# Patient Record
Sex: FEMALE | Race: WHITE | NOT HISPANIC OR LATINO | Employment: PART TIME | URBAN - METROPOLITAN AREA
[De-identification: names, ages, dates, MRNs, and addresses within clinical notes are randomized per-mention and may not be internally consistent; named-entity substitution may affect disease eponyms.]

---

## 2018-02-02 ENCOUNTER — APPOINTMENT (OUTPATIENT)
Dept: URGENT CARE | Facility: CLINIC | Age: 63
End: 2018-02-02
Payer: OTHER MISCELLANEOUS

## 2018-02-02 ENCOUNTER — APPOINTMENT (OUTPATIENT)
Dept: RADIOLOGY | Facility: CLINIC | Age: 63
End: 2018-02-02

## 2018-02-02 DIAGNOSIS — M79.671 RIGHT FOOT PAIN: Primary | ICD-10-CM

## 2018-02-02 PROCEDURE — 73610 X-RAY EXAM OF ANKLE: CPT

## 2018-02-02 PROCEDURE — 99283 EMERGENCY DEPT VISIT LOW MDM: CPT

## 2018-02-02 PROCEDURE — 73630 X-RAY EXAM OF FOOT: CPT

## 2018-02-02 PROCEDURE — G0382 LEV 3 HOSP TYPE B ED VISIT: HCPCS

## 2018-05-31 ENCOUNTER — APPOINTMENT (OUTPATIENT)
Dept: URGENT CARE | Facility: CLINIC | Age: 63
End: 2018-05-31
Payer: OTHER MISCELLANEOUS

## 2018-05-31 PROCEDURE — 99213 OFFICE O/P EST LOW 20 MIN: CPT

## 2018-10-27 ENCOUNTER — APPOINTMENT (EMERGENCY)
Dept: RADIOLOGY | Facility: HOSPITAL | Age: 63
DRG: 241 | End: 2018-10-27
Payer: COMMERCIAL

## 2018-10-27 ENCOUNTER — APPOINTMENT (INPATIENT)
Dept: CT IMAGING | Facility: HOSPITAL | Age: 63
DRG: 241 | End: 2018-10-27
Payer: COMMERCIAL

## 2018-10-27 ENCOUNTER — OFFICE VISIT (OUTPATIENT)
Dept: URGENT CARE | Facility: CLINIC | Age: 63
End: 2018-10-27
Payer: COMMERCIAL

## 2018-10-27 ENCOUNTER — HOSPITAL ENCOUNTER (INPATIENT)
Facility: HOSPITAL | Age: 63
LOS: 2 days | Discharge: HOME | DRG: 241 | End: 2018-10-29
Attending: EMERGENCY MEDICINE | Admitting: INTERNAL MEDICINE
Payer: COMMERCIAL

## 2018-10-27 VITALS
DIASTOLIC BLOOD PRESSURE: 86 MMHG | HEART RATE: 114 BPM | OXYGEN SATURATION: 95 % | TEMPERATURE: 99.5 F | HEIGHT: 64 IN | WEIGHT: 184 LBS | BODY MASS INDEX: 31.41 KG/M2 | SYSTOLIC BLOOD PRESSURE: 156 MMHG | RESPIRATION RATE: 18 BRPM

## 2018-10-27 DIAGNOSIS — K92.2 GI BLEED: Primary | ICD-10-CM

## 2018-10-27 DIAGNOSIS — D62 ACUTE BLOOD LOSS ANEMIA: ICD-10-CM

## 2018-10-27 DIAGNOSIS — K92.2 ACUTE GI BLEEDING: ICD-10-CM

## 2018-10-27 DIAGNOSIS — K92.1 BLACK STOOLS: ICD-10-CM

## 2018-10-27 DIAGNOSIS — K92.1 MELENA: ICD-10-CM

## 2018-10-27 DIAGNOSIS — R10.30 LOWER ABDOMINAL PAIN: ICD-10-CM

## 2018-10-27 DIAGNOSIS — R53.83 FATIGUE, UNSPECIFIED TYPE: Primary | ICD-10-CM

## 2018-10-27 PROBLEM — E11.65 TYPE 2 DIABETES MELLITUS WITH HYPERGLYCEMIA, WITHOUT LONG-TERM CURRENT USE OF INSULIN (HCC): Status: ACTIVE | Noted: 2018-10-27

## 2018-10-27 PROBLEM — I10 HYPERTENSION: Chronic | Status: ACTIVE | Noted: 2018-10-27

## 2018-10-27 PROBLEM — E66.09 CLASS 1 OBESITY DUE TO EXCESS CALORIES WITHOUT SERIOUS COMORBIDITY WITH BODY MASS INDEX (BMI) OF 30.0 TO 30.9 IN ADULT: Chronic | Status: ACTIVE | Noted: 2018-10-27

## 2018-10-27 PROBLEM — K21.9 GERD (GASTROESOPHAGEAL REFLUX DISEASE): Chronic | Status: ACTIVE | Noted: 2018-10-27

## 2018-10-27 PROBLEM — E11.9 DIABETES MELLITUS (HCC): Chronic | Status: ACTIVE | Noted: 2018-10-27

## 2018-10-27 PROBLEM — E66.811 CLASS 1 OBESITY DUE TO EXCESS CALORIES WITHOUT SERIOUS COMORBIDITY WITH BODY MASS INDEX (BMI) OF 30.0 TO 30.9 IN ADULT: Chronic | Status: ACTIVE | Noted: 2018-10-27

## 2018-10-27 LAB
ABO GROUP BLD: NORMAL
ACETONE SERPL-MCNC: NEGATIVE MG/DL
ALBUMIN SERPL BCP-MCNC: 3.2 G/DL (ref 3.5–5)
ALP SERPL-CCNC: 58 U/L (ref 46–116)
ALT SERPL W P-5'-P-CCNC: 23 U/L (ref 12–78)
ANION GAP SERPL CALCULATED.3IONS-SCNC: 11 MMOL/L (ref 4–13)
APTT PPP: 23 SECONDS (ref 24–36)
AST SERPL W P-5'-P-CCNC: 7 U/L (ref 5–45)
ATRIAL RATE: 102 BPM
BACTERIA UR QL AUTO: ABNORMAL /HPF
BASOPHILS # BLD AUTO: 0.03 THOUSANDS/ΜL (ref 0–0.1)
BASOPHILS # BLD AUTO: 0.05 THOUSANDS/ΜL (ref 0–0.1)
BASOPHILS NFR BLD AUTO: 0 % (ref 0–1)
BASOPHILS NFR BLD AUTO: 0 % (ref 0–1)
BILIRUB SERPL-MCNC: 0.2 MG/DL (ref 0.2–1)
BILIRUB UR QL STRIP: NEGATIVE
BLD GP AB SCN SERPL QL: NEGATIVE
BUN SERPL-MCNC: 39 MG/DL (ref 5–25)
CALCIUM SERPL-MCNC: 8.4 MG/DL (ref 8.3–10.1)
CHLORIDE SERPL-SCNC: 105 MMOL/L (ref 100–108)
CLARITY UR: CLEAR
CO2 SERPL-SCNC: 23 MMOL/L (ref 21–32)
COLOR UR: YELLOW
CREAT SERPL-MCNC: 0.75 MG/DL (ref 0.6–1.3)
EOSINOPHIL # BLD AUTO: 0.05 THOUSAND/ΜL (ref 0–0.61)
EOSINOPHIL # BLD AUTO: 0.08 THOUSAND/ΜL (ref 0–0.61)
EOSINOPHIL NFR BLD AUTO: 0 % (ref 0–6)
EOSINOPHIL NFR BLD AUTO: 1 % (ref 0–6)
ERYTHROCYTE [DISTWIDTH] IN BLOOD BY AUTOMATED COUNT: 12.6 % (ref 11.6–15.1)
ERYTHROCYTE [DISTWIDTH] IN BLOOD BY AUTOMATED COUNT: 12.6 % (ref 11.6–15.1)
FERRITIN SERPL-MCNC: 46 NG/ML (ref 8–388)
GFR SERPL CREATININE-BSD FRML MDRD: 86 ML/MIN/1.73SQ M
GLUCOSE SERPL-MCNC: 143 MG/DL (ref 65–140)
GLUCOSE SERPL-MCNC: 242 MG/DL (ref 65–140)
GLUCOSE UR STRIP-MCNC: NEGATIVE MG/DL
HCT VFR BLD AUTO: 21.3 % (ref 34.8–46.1)
HCT VFR BLD AUTO: 25.3 % (ref 34.8–46.1)
HGB BLD-MCNC: 7.1 G/DL (ref 11.5–15.4)
HGB BLD-MCNC: 8.4 G/DL (ref 11.5–15.4)
HGB UR QL STRIP.AUTO: NEGATIVE
IMM GRANULOCYTES # BLD AUTO: 0.09 THOUSAND/UL (ref 0–0.2)
IMM GRANULOCYTES # BLD AUTO: 0.11 THOUSAND/UL (ref 0–0.2)
IMM GRANULOCYTES NFR BLD AUTO: 1 % (ref 0–2)
IMM GRANULOCYTES NFR BLD AUTO: 1 % (ref 0–2)
INR PPP: 1.15 (ref 0.86–1.17)
KETONES UR STRIP-MCNC: NEGATIVE MG/DL
LEUKOCYTE ESTERASE UR QL STRIP: ABNORMAL
LYMPHOCYTES # BLD AUTO: 3.36 THOUSANDS/ΜL (ref 0.6–4.47)
LYMPHOCYTES # BLD AUTO: 4.08 THOUSANDS/ΜL (ref 0.6–4.47)
LYMPHOCYTES NFR BLD AUTO: 28 % (ref 14–44)
LYMPHOCYTES NFR BLD AUTO: 40 % (ref 14–44)
MAGNESIUM SERPL-MCNC: 1.7 MG/DL (ref 1.6–2.6)
MCH RBC QN AUTO: 31.5 PG (ref 26.8–34.3)
MCH RBC QN AUTO: 31.6 PG (ref 26.8–34.3)
MCHC RBC AUTO-ENTMCNC: 33.2 G/DL (ref 31.4–37.4)
MCHC RBC AUTO-ENTMCNC: 33.3 G/DL (ref 31.4–37.4)
MCV RBC AUTO: 95 FL (ref 82–98)
MCV RBC AUTO: 95 FL (ref 82–98)
MONOCYTES # BLD AUTO: 0.5 THOUSAND/ΜL (ref 0.17–1.22)
MONOCYTES # BLD AUTO: 0.58 THOUSAND/ΜL (ref 0.17–1.22)
MONOCYTES NFR BLD AUTO: 4 % (ref 4–12)
MONOCYTES NFR BLD AUTO: 6 % (ref 4–12)
NEUTROPHILS # BLD AUTO: 5.26 THOUSANDS/ΜL (ref 1.85–7.62)
NEUTROPHILS # BLD AUTO: 7.94 THOUSANDS/ΜL (ref 1.85–7.62)
NEUTS SEG NFR BLD AUTO: 52 % (ref 43–75)
NEUTS SEG NFR BLD AUTO: 67 % (ref 43–75)
NITRITE UR QL STRIP: NEGATIVE
NON-SQ EPI CELLS URNS QL MICRO: ABNORMAL /HPF
NRBC BLD AUTO-RTO: 0 /100 WBCS
NRBC BLD AUTO-RTO: 0 /100 WBCS
P AXIS: 47 DEGREES
PH UR STRIP.AUTO: 5.5 [PH] (ref 4.5–8)
PHOSPHATE SERPL-MCNC: 3.4 MG/DL (ref 2.3–4.1)
PLATELET # BLD AUTO: 190 THOUSANDS/UL (ref 149–390)
PLATELET # BLD AUTO: 234 THOUSANDS/UL (ref 149–390)
PMV BLD AUTO: 9.3 FL (ref 8.9–12.7)
PMV BLD AUTO: 9.7 FL (ref 8.9–12.7)
POTASSIUM SERPL-SCNC: 4.3 MMOL/L (ref 3.5–5.3)
PR INTERVAL: 132 MS
PROT SERPL-MCNC: 5.7 G/DL (ref 6.4–8.2)
PROT UR STRIP-MCNC: NEGATIVE MG/DL
PROTHROMBIN TIME: 14 SECONDS (ref 11.8–14.2)
QRS AXIS: 9 DEGREES
QRSD INTERVAL: 98 MS
QT INTERVAL: 366 MS
QTC INTERVAL: 477 MS
RBC # BLD AUTO: 2.25 MILLION/UL (ref 3.81–5.12)
RBC # BLD AUTO: 2.67 MILLION/UL (ref 3.81–5.12)
RBC #/AREA URNS AUTO: ABNORMAL /HPF
RH BLD: POSITIVE
SL AMB POCT GLUCOSE BLD: 248
SODIUM SERPL-SCNC: 139 MMOL/L (ref 136–145)
SP GR UR STRIP.AUTO: 1.02 (ref 1–1.03)
SPECIMEN EXPIRATION DATE: NORMAL
T WAVE AXIS: 10 DEGREES
TROPONIN I SERPL-MCNC: <0.02 NG/ML
TROPONIN I SERPL-MCNC: <0.02 NG/ML
TSH SERPL DL<=0.05 MIU/L-ACNC: 1.81 UIU/ML (ref 0.36–3.74)
UROBILINOGEN UR QL STRIP.AUTO: 0.2 E.U./DL
VENTRICULAR RATE: 102 BPM
WBC # BLD AUTO: 10.12 THOUSAND/UL (ref 4.31–10.16)
WBC # BLD AUTO: 12.01 THOUSAND/UL (ref 4.31–10.16)
WBC #/AREA URNS AUTO: ABNORMAL /HPF

## 2018-10-27 PROCEDURE — 36415 COLL VENOUS BLD VENIPUNCTURE: CPT | Performed by: EMERGENCY MEDICINE

## 2018-10-27 PROCEDURE — 82272 OCCULT BLD FECES 1-3 TESTS: CPT

## 2018-10-27 PROCEDURE — C9113 INJ PANTOPRAZOLE SODIUM, VIA: HCPCS | Performed by: EMERGENCY MEDICINE

## 2018-10-27 PROCEDURE — 81001 URINALYSIS AUTO W/SCOPE: CPT | Performed by: EMERGENCY MEDICINE

## 2018-10-27 PROCEDURE — 99213 OFFICE O/P EST LOW 20 MIN: CPT | Performed by: PHYSICIAN ASSISTANT

## 2018-10-27 PROCEDURE — 85025 COMPLETE CBC W/AUTO DIFF WBC: CPT | Performed by: INTERNAL MEDICINE

## 2018-10-27 PROCEDURE — 84100 ASSAY OF PHOSPHORUS: CPT | Performed by: EMERGENCY MEDICINE

## 2018-10-27 PROCEDURE — 83735 ASSAY OF MAGNESIUM: CPT | Performed by: EMERGENCY MEDICINE

## 2018-10-27 PROCEDURE — 86850 RBC ANTIBODY SCREEN: CPT | Performed by: EMERGENCY MEDICINE

## 2018-10-27 PROCEDURE — 86900 BLOOD TYPING SEROLOGIC ABO: CPT | Performed by: EMERGENCY MEDICINE

## 2018-10-27 PROCEDURE — 71045 X-RAY EXAM CHEST 1 VIEW: CPT

## 2018-10-27 PROCEDURE — 93005 ELECTROCARDIOGRAM TRACING: CPT

## 2018-10-27 PROCEDURE — 84443 ASSAY THYROID STIM HORMONE: CPT | Performed by: EMERGENCY MEDICINE

## 2018-10-27 PROCEDURE — 93010 ELECTROCARDIOGRAM REPORT: CPT | Performed by: INTERNAL MEDICINE

## 2018-10-27 PROCEDURE — 80053 COMPREHEN METABOLIC PANEL: CPT | Performed by: EMERGENCY MEDICINE

## 2018-10-27 PROCEDURE — 84484 ASSAY OF TROPONIN QUANT: CPT | Performed by: EMERGENCY MEDICINE

## 2018-10-27 PROCEDURE — 86920 COMPATIBILITY TEST SPIN: CPT

## 2018-10-27 PROCEDURE — 96376 TX/PRO/DX INJ SAME DRUG ADON: CPT

## 2018-10-27 PROCEDURE — 96365 THER/PROPH/DIAG IV INF INIT: CPT

## 2018-10-27 PROCEDURE — 99223 1ST HOSP IP/OBS HIGH 75: CPT | Performed by: INTERNAL MEDICINE

## 2018-10-27 PROCEDURE — 85025 COMPLETE CBC W/AUTO DIFF WBC: CPT | Performed by: EMERGENCY MEDICINE

## 2018-10-27 PROCEDURE — 82009 KETONE BODYS QUAL: CPT | Performed by: EMERGENCY MEDICINE

## 2018-10-27 PROCEDURE — 82948 REAGENT STRIP/BLOOD GLUCOSE: CPT

## 2018-10-27 PROCEDURE — 82728 ASSAY OF FERRITIN: CPT | Performed by: INTERNAL MEDICINE

## 2018-10-27 PROCEDURE — 99285 EMERGENCY DEPT VISIT HI MDM: CPT

## 2018-10-27 PROCEDURE — 85610 PROTHROMBIN TIME: CPT | Performed by: EMERGENCY MEDICINE

## 2018-10-27 PROCEDURE — 74177 CT ABD & PELVIS W/CONTRAST: CPT

## 2018-10-27 PROCEDURE — 87086 URINE CULTURE/COLONY COUNT: CPT | Performed by: EMERGENCY MEDICINE

## 2018-10-27 PROCEDURE — P9016 RBC LEUKOCYTES REDUCED: HCPCS

## 2018-10-27 PROCEDURE — 30233N1 TRANSFUSION OF NONAUTOLOGOUS RED BLOOD CELLS INTO PERIPHERAL VEIN, PERCUTANEOUS APPROACH: ICD-10-PCS | Performed by: EMERGENCY MEDICINE

## 2018-10-27 PROCEDURE — 85730 THROMBOPLASTIN TIME PARTIAL: CPT | Performed by: EMERGENCY MEDICINE

## 2018-10-27 PROCEDURE — 86901 BLOOD TYPING SEROLOGIC RH(D): CPT | Performed by: EMERGENCY MEDICINE

## 2018-10-27 PROCEDURE — 84484 ASSAY OF TROPONIN QUANT: CPT | Performed by: INTERNAL MEDICINE

## 2018-10-27 RX ORDER — RIBOFLAVIN (VITAMIN B2) 100 MG
100 TABLET ORAL DAILY
COMMUNITY

## 2018-10-27 RX ORDER — ATENOLOL 50 MG/1
TABLET ORAL
COMMUNITY
End: 2018-10-27

## 2018-10-27 RX ORDER — LISINOPRIL 2.5 MG/1
TABLET ORAL
COMMUNITY
End: 2018-10-27

## 2018-10-27 RX ORDER — ACETAMINOPHEN 325 MG/1
650 TABLET ORAL EVERY 6 HOURS PRN
Status: DISCONTINUED | OUTPATIENT
Start: 2018-10-27 | End: 2018-10-29 | Stop reason: HOSPADM

## 2018-10-27 RX ORDER — ASPIRIN 325 MG
325 TABLET ORAL DAILY
COMMUNITY
End: 2018-10-29 | Stop reason: HOSPADM

## 2018-10-27 RX ORDER — CHOLECALCIFEROL (VITAMIN D3) 125 MCG
CAPSULE ORAL
COMMUNITY

## 2018-10-27 RX ORDER — FUROSEMIDE 10 MG/ML
20 INJECTION INTRAMUSCULAR; INTRAVENOUS ONCE
Status: COMPLETED | OUTPATIENT
Start: 2018-10-27 | End: 2018-10-27

## 2018-10-27 RX ORDER — PANTOPRAZOLE SODIUM 40 MG/1
40 INJECTION, POWDER, FOR SOLUTION INTRAVENOUS ONCE
Status: COMPLETED | OUTPATIENT
Start: 2018-10-27 | End: 2018-10-27

## 2018-10-27 RX ORDER — ONDANSETRON 2 MG/ML
4 INJECTION INTRAMUSCULAR; INTRAVENOUS EVERY 6 HOURS PRN
Status: DISCONTINUED | OUTPATIENT
Start: 2018-10-27 | End: 2018-10-29 | Stop reason: HOSPADM

## 2018-10-27 RX ORDER — SODIUM CHLORIDE 9 MG/ML
75 INJECTION, SOLUTION INTRAVENOUS CONTINUOUS
Status: DISCONTINUED | OUTPATIENT
Start: 2018-10-27 | End: 2018-10-29 | Stop reason: HOSPADM

## 2018-10-27 RX ORDER — VITAMIN B COMPLEX
1 CAPSULE ORAL DAILY
COMMUNITY

## 2018-10-27 RX ORDER — ACETAMINOPHEN 325 MG/1
650 TABLET ORAL EVERY 6 HOURS PRN
COMMUNITY

## 2018-10-27 RX ADMIN — IOHEXOL 50 ML: 240 INJECTION, SOLUTION INTRATHECAL; INTRAVASCULAR; INTRAVENOUS; ORAL at 14:44

## 2018-10-27 RX ADMIN — SODIUM CHLORIDE 1000 ML: 0.9 INJECTION, SOLUTION INTRAVENOUS at 10:39

## 2018-10-27 RX ADMIN — SODIUM CHLORIDE 125 ML/HR: 0.9 INJECTION, SOLUTION INTRAVENOUS at 15:27

## 2018-10-27 RX ADMIN — SODIUM CHLORIDE 8 MG/HR: 9 INJECTION, SOLUTION INTRAVENOUS at 23:20

## 2018-10-27 RX ADMIN — SODIUM CHLORIDE 8 MG/HR: 9 INJECTION, SOLUTION INTRAVENOUS at 10:42

## 2018-10-27 RX ADMIN — SODIUM CHLORIDE 125 ML/HR: 0.9 INJECTION, SOLUTION INTRAVENOUS at 23:19

## 2018-10-27 RX ADMIN — PANTOPRAZOLE SODIUM 40 MG: 40 INJECTION, POWDER, FOR SOLUTION INTRAVENOUS at 10:39

## 2018-10-27 RX ADMIN — IOHEXOL 100 ML: 350 INJECTION, SOLUTION INTRAVENOUS at 14:43

## 2018-10-27 RX ADMIN — FUROSEMIDE 20 MG: 10 INJECTION, SOLUTION INTRAVENOUS at 20:35

## 2018-10-27 NOTE — ED NOTES
Ambulatory to bathroom, voided qs  Patient waiting on CT scan and inpatient bed  Appears comfortable       Ag Davis RN  10/27/18 7345

## 2018-10-27 NOTE — ED NOTES
Resting quietly at this time  Denies any complaints  Awaiting results       Juno Bryant, ETHEL  10/27/18 6243

## 2018-10-27 NOTE — CONSULTS
Consultation - GI   Marco A Gonzalez 59 y o  female MRN: [id number]  Unit/Bed#: ED 10-01 Encounter: [id number]    Consults    ASSESSMENT:  1  Anemia - unclear baseline as no previous lab work seen and pt has not really had good medical follow up for several years  However, given the "dark stools" in setting of lower abd cramping and recent daily ASA 325mg, will need to rule out PUD  She has never had a colonoscopy so if EGD unremarkable, will need to proceed to rule out colon diverticular disease, angioectasias/malignancies  Hemodynamically stable with no further bleeding and rectal vault with some dark brown stool  FOBT +     2  Lower abd cramping and leukocytosis in setting of possible constipation - pt's history is also however concerning for diverticulitis given the 2 days of cramping and elev WBC in ER  So before any endoscopic evaluation, will need to rule out diverticulitis  PLAN:  Hold ASA 325mg  Check H&H q8hrs and transfuse to hgb >8  Check CT today to rule out diverticulitis and other abd etiologies for the pain/leukocytosis  PPI IV BID  NPO for now  Characterize the anemia with iron panel since we do not know her baseline  Pending above, will proceed with EGD Monday to give time for the aspirin 325 to wear off, or more urgently if needed  Chief Complaint   Patient presents with    Fatigue     patient presents to the ED with c/o fatigue, chills, and SOB for the past two days  Patient states she is under an unusual amount of stress  HPI:   This is a 59 y  o female who GI is consulted for acute blood loss anemia and dark stools  Pt states she does not have insurance and has not really had a good follow up in the past years  She did see a GI doctor in the past and was told she has IBS and was recommended a colonoscopy and dietary changes  However, she never followed up with the colonoscopy      She has been under a lot of stress recently because her  passed away from a cancer 2 months ago, and she's been trying to sell the house, have kid stress, etc      In setting of all this, she also has arthritis and started taking  once a day in the morning for the past week in addition to her Tylenol arthritis  She normally gets constipation alternating with looser stools, but recently, she didn't have a BM for several days, and then 2 days ago proceeded to pass a hard dark stool  Since then, she had 2 more episodes of dark stools, last one being yesterday morning  At one point, she thinks she might have also seen some tinges of red blood with the BM  She had a lot of lower abdominal cramping associated with these bowel movements which have since resolved after her last BM yesterday morning  Pt has baseline GERD and intermittent heartburn, but does not take any reg meds  No vomiting of blood  Past Medical History:   Diagnosis Date    Diabetes mellitus ([facility] [address] )     prediabetic     Hypertension     Irritable bowel disease     Vertigo        Past Surgical History:   Procedure Laterality Date    BREAST LUMPECTOMY      HYSTERECTOMY           (Not in a hospital admission)    Allergies   Allergen Reactions    Prednisone     Corticosteroids Anxiety       Social History     History reviewed  No pertinent family history  Review of Systems:  General ROS: negative for - chills, fever, night sweats or weight loss   Positive for fatigue  Psychological ROS: positive for anxiety given recent 's death  ENT ROS: negative for - headaches or sore throat  Hematological and Lymphatic ROS: negative for - chronic bleeding problems or bruising  Endocrine ROS: negative for - malaise/lethargy, palpitations, polydipsia/polyuria or temperature intolerance  Respiratory ROS: no cough, shortness of breath, or wheezing  negative for - orthopnea, shortness of breath, sputum changes, stridor or wheezing  Cardiovascular ROS: negative for - chest pain, dyspnea on exertion, edema, irregular heartbeat or palpitations  Gastrointestinal ROS: negative for -N/V/diarrhea/dysphagia/weight changes   Positive for recent constipation followed by one dark hard stool and some red blood, lower abd cramping   Genito-Urinary ROS: negative for - hematuria, incontinence or urinary frequency/urgency  Musculoskeletal ROS: negative for - joint pain or muscular weakness  Neurological ROS: negative for - confusion, headaches, seizures or tremors    /62   Pulse 104   Temp 98 8 °F (37 1 °C) (Temporal)   Resp 18   Ht 5' 4" (1 626 m)   Wt 83 5 kg (184 lb)   SpO2 98%   BMI 31 58 kg/m²     PHYSICALEXAM:  General appearance: alert, appears stated age and cooperative  HEENT: Normocephalic, w/o obvious abnormality, atraumatic, pale conjunctiva, PERRL, EOM's intact  Throat: lips, mucosa, and tongue normal; teeth and gums normal  Neck: no adenopathy, no JVD, supple, symmetrical, trachea midline  Lungs: clear to auscultation bilaterally, No wheeze/rales  Heart: regular rate and rhythm, S1, S2 normal, no murmur  Abdomen: soft, non-tender; bowel sounds normal; no masses,  no organomegaly  Rectal: brown stool, FOBT+  Extremities: extremities normal, atraumatic, no cyanosis or edema  Skin: Skin color, texture, turgor normal  No rashes or lesions  Neurologic: Grossly normal      Lab Results   Component Value Date    CALCIUM 8 4 10/27/2018     10/27/2018    K 4 3 10/27/2018    CO2 23 10/27/2018     10/27/2018    BUN 39 (H) 10/27/2018    CREATININE 0 75 10/27/2018     Lab Results   Component Value Date    WBC 12 01 (H) 10/27/2018    HGB 8 4 (L) 10/27/2018    HCT 25 3 (L) 10/27/2018    MCV 95 10/27/2018     10/27/2018     Lab Results   Component Value Date    ALT 23 10/27/2018    AST 7 10/27/2018    ALKPHOS 58 10/27/2018     Lab Results   Component Value Date    INR 1 15 10/27/2018     No components found for: AMYLJKJJJASE  No results found for: LIPASE  No results found for: IRON, TIBC, FERRITIN

## 2018-10-27 NOTE — H&P
Assessment/Plan     Principal Problem:    Acute GI bleeding  Active Problems:    Lower abdominal pain    Type 2 diabetes mellitus with hyperglycemia, without long-term current use of insulin (HCC)    GERD (gastroesophageal reflux disease)    Hypertension    Class 1 obesity due to excess calories without serious comorbidity with body mass index (BMI) of 30 0 to 30 9 in adult       1  Acute GI bleed-consult to gi and will keep NPO for now-differential includes upper GI source with her longstanding history of GERD and resuming aspirin recently  Also having lower abdominal pain and cramping Will and diverticular source may be a cause-will order CT scan of the abdomen pelvis  2  Hyperglycemia-greater than 400 previously been listed as prediabetic but she truly has diabetes mellitus type 2 with these high readings-place on coverage for now but may need meds instituted in the near future  3  Hypertension-watch serial reading  Subjective     Salome Rubio is an 59 y o  female  HPI:  Who presents to the ER with worsening lower abdominal cramping off and on for the last year which she blamed on erectile will bowel issues  She has never had a colonoscopy but had seen GI in [place] remotely in the past   She reports increased lower abdominal cramps  and melena for the past 24 hours-she had dark stools with some minimal bright red blood this morning prompting her to come to the ER  She relates that she had resumed aspirin approximately a week ago for arthritis issues and also uses Tylenol on a regular basis  She complained of some lightheadedness initially on presentation  Is mildly tachycardic at 114 and blood pressure is stable 126/59  Past Surgical History:   Procedure Laterality Date    BREAST LUMPECTOMY      HYSTERECTOMY         Review of Systems   Neurological: Positive for dizziness and weakness         Objective     /59   Pulse (!) 111   Temp 98 8 °F (37 1 °C) (Temporal)   Resp (!) 41   Ht 5' 4" (1 626 m)   Wt 83 5 kg (184 lb)   SpO2 96%   BMI 31 58 kg/m²     Physical Exam   Constitutional: She is oriented to person, place, and time  She appears well-developed and well-nourished  She appears distressed  HENT:   Head: Normocephalic and atraumatic  Mouth/Throat: No oropharyngeal exudate  Eyes: Right eye exhibits no discharge  Left eye exhibits no discharge  No scleral icterus  Neck: No tracheal deviation present  No thyromegaly present  Cardiovascular: Normal rate and regular rhythm  Exam reveals no friction rub  No murmur heard  Pulmonary/Chest: Effort normal and breath sounds normal  No respiratory distress  She has no wheezes  Abdominal: Soft  She exhibits no distension  There is tenderness  Tenderness in the right lower quadrant greater than left lower quadrant with somewhat hyperactive bowel sounds   Musculoskeletal: She exhibits edema  Neurological: She is alert and oriented to person, place, and time  No cranial nerve deficit  Skin: No rash noted  No erythema  Psychiatric: She has a normal mood and affect  Her behavior is normal    Nursing note and vitals reviewed  Latosha Hopkins

## 2018-10-27 NOTE — ED PROVIDER NOTES
History  Chief Complaint   Patient presents with    Fatigue     patient presents to the ED with c/o fatigue, chills, and SOB for the past two days  Patient states she is under an unusual amount of stress  This is a 59-year-old female who was referred here from urgent care for evaluation of a 2 day history of weakness fatigue shortness of breath on exertion and melena  She did start taking a baby aspirin daily approximately 1 week ago  Denies any chest pain  She is a former smoker and has a history of hypertension and diabetes  Rectal examination in the ER shows melanotic stool which is heme test positive        History provided by:  Patient  Medical Problem   Location:  Generalized  Quality:  Weakness and fatigue in addition to melena  Severity:  Moderate  Onset quality:  Gradual  Duration:  3 days  Timing:  Constant  Progression:  Worsening  Chronicity:  New  Context:  3 days increasing symptoms of fatigue and weakness  Worsened by:  Exertion  Associated symptoms: abdominal pain (Mild crampy  lower abdomen), fatigue, nausea and shortness of breath    Associated symptoms: no chest pain        Prior to Admission Medications   Prescriptions Last Dose Informant Patient Reported? Taking?   acetaminophen (TYLENOL) 325 mg tablet   Yes Yes   Sig: Take 650 mg by mouth every 6 (six) hours as needed for mild pain   aspirin 325 mg tablet   Yes Yes   Sig: Take 325 mg by mouth daily      Facility-Administered Medications: None       Past Medical History:   Diagnosis Date    Diabetes mellitus ([facility] [address] )     prediabetic     Hypertension     Irritable bowel disease     Vertigo        Past Surgical History:   Procedure Laterality Date    BREAST LUMPECTOMY      HYSTERECTOMY         History reviewed  No pertinent family history  I have reviewed and agree with the history as documented      Social History   Substance Use Topics    Smoking status: Former Smoker    Smokeless tobacco: Never Used    Alcohol use No Review of Systems   Constitutional: Positive for fatigue  Respiratory: Positive for shortness of breath  Cardiovascular: Negative for chest pain  Gastrointestinal: Positive for abdominal pain (Mild crampy  lower abdomen), blood in stool and nausea  Neurological: Positive for weakness  All other systems reviewed and are negative  Physical Exam  Physical Exam   Constitutional: She is oriented to person, place, and time  She appears well-developed  No distress  HENT:   Head: Normocephalic and atraumatic  Right Ear: External ear normal    Left Ear: External ear normal    Nose: Nose normal    Mouth/Throat: Oropharynx is clear and moist    Eyes: Pupils are equal, round, and reactive to light  EOM are normal  No scleral icterus  Neck: Neck supple  No tracheal deviation present  Cardiovascular: Regular rhythm and intact distal pulses  Exam reveals no gallop and no friction rub  No murmur heard  Slightly tachycardic   Pulmonary/Chest: Effort normal and breath sounds normal  No stridor  No respiratory distress  She has no wheezes  She has no rales  Abdominal: Soft  Bowel sounds are normal  She exhibits no distension  There is no tenderness  There is no rebound and no guarding  Genitourinary: Rectal exam shows guaiac positive stool  Genitourinary Comments: Melanotic stool which is heme test positive   Musculoskeletal: Normal range of motion  Neurological: She is alert and oriented to person, place, and time  No cranial nerve deficit  Skin: Skin is warm and dry  She is not diaphoretic  There is pallor  Psychiatric: She has a normal mood and affect  Her behavior is normal  Thought content normal    Nursing note and vitals reviewed        Vital Signs  ED Triage Vitals [10/27/18 1015]   Temperature Pulse Respirations Blood Pressure SpO2   98 8 °F (37 1 °C) (!) 112 20 128/54 98 %      Temp Source Heart Rate Source Patient Position - Orthostatic VS BP Location FiO2 (%)   Temporal Monitor Sitting Left arm --      Pain Score       4           Vitals:    10/27/18 1015 10/27/18 1030 10/27/18 1045 10/27/18 1100   BP: 128/54 149/68 152/69 123/58   Pulse: (!) 112  (!) 108 99   Patient Position - Orthostatic VS: Sitting          Visual Acuity      ED Medications  Medications   pantoprazole (PROTONIX) 80 mg in sodium chloride 0 9 % 100 mL infusion (8 mg/hr Intravenous New Bag 10/27/18 1042)   sodium chloride 0 9 % bolus 1,000 mL (1,000 mL Intravenous New Bag 10/27/18 1039)   pantoprazole (PROTONIX) injection 40 mg (40 mg Intravenous Given 10/27/18 1039)       Diagnostic Studies  Results Reviewed     Procedure Component Value Units Date/Time    UA w Reflex to Microscopic w Reflex to Culture [[id number]] Collected:  10/27/18 1152    Lab Status:  No result Specimen:  Urine from Urine, Clean Catch     TSH [[id number]]  (Normal) Collected:  10/27/18 1032    Lab Status:  Final result Specimen:  Blood from Arm, Right Updated:  10/27/18 1110     TSH 3RD GENERATON 1 811 uIU/mL     Narrative:         Patients undergoing fluorescein dye angiography may retain small amounts of fluorescein in the body for 48-72 hours post procedure  Samples containing fluorescein can produce falsely depressed TSH values  If the patient had this procedure,a specimen should be resubmitted post fluorescein clearance            The recommended reference ranges for TSH during pregnancy are as follows:  First trimester 0 1 to 2 5 uIU/mL  Second trimester  0 2 to 3 0 uIU/mL  Third trimester 0 3 to 3 0 uIU/m      Phosphorus [[id number]]  (Normal) Collected:  10/27/18 1032    Lab Status:  Final result Specimen:  Blood from Arm, Right Updated:  10/27/18 1110     Phosphorus 3 4 mg/dL     Magnesium [[id number]]  (Normal) Collected:  10/27/18 1032    Lab Status:  Final result Specimen:  Blood from Arm, Right Updated:  10/27/18 1110     Magnesium 1 7 mg/dL     Troponin I [[id number]]  (Normal) Collected:  10/27/18 1032    Lab Status:  Final result Specimen: Blood from Arm, Right Updated:  10/27/18 1105     Troponin I <0 02 ng/mL     Comprehensive metabolic panel [[id number]]  (Abnormal) Collected:  10/27/18 1032    Lab Status:  Final result Specimen:  Blood from Arm, Right Updated:  10/27/18 1102     Sodium 139 mmol/L      Potassium 4 3 mmol/L      Chloride 105 mmol/L      CO2 23 mmol/L      ANION GAP 11 mmol/L      BUN 39 (H) mg/dL      Creatinine 0 75 mg/dL      Glucose 242 (H) mg/dL      Calcium 8 4 mg/dL      AST 7 U/L      ALT 23 U/L      Alkaline Phosphatase 58 U/L      Total Protein 5 7 (L) g/dL      Albumin 3 2 (L) g/dL      Total Bilirubin 0 20 mg/dL      eGFR 86 ml/min/1 73sq m     Narrative:         National Kidney Disease Education Program recommendations are as follows:  GFR calculation is accurate only with a steady state creatinine  Chronic Kidney disease less than 60 ml/min/1 73 sq  meters  Kidney failure less than 15 ml/min/1 73 sq  meters      Protime-INR [[id number]]  (Normal) Collected:  10/27/18 1032    Lab Status:  Final result Specimen:  Blood from Arm, Right Updated:  10/27/18 1057     Protime 14 0 seconds      INR 1 15    APTT [[id number]]  (Abnormal) Collected:  10/27/18 1032    Lab Status:  Final result Specimen:  Blood from Arm, Right Updated:  10/27/18 1057     PTT 23 (L) seconds     Acetone [[id number]]  (Normal) Collected:  10/27/18 1032    Lab Status:  Final result Specimen:  Blood from Arm, Right Updated:  10/27/18 1053     Acetone, Bld Negative    CBC and differential [[id number]]  (Abnormal) Collected:  10/27/18 1032    Lab Status:  Final result Specimen:  Blood from Arm, Right Updated:  10/27/18 1044     WBC 12 01 (H) Thousand/uL      RBC 2 67 (L) Million/uL      Hemoglobin 8 4 (L) g/dL      Hematocrit 25 3 (L) %      MCV 95 fL      MCH 31 5 pg      MCHC 33 2 g/dL      RDW 12 6 %      MPV 9 7 fL      Platelets 713 Thousands/uL      nRBC 0 /100 WBCs      Neutrophils Relative 67 %      Immat GRANS % 1 %      Lymphocytes Relative 28 % Monocytes Relative 4 %      Eosinophils Relative 0 %      Basophils Relative 0 %      Neutrophils Absolute 7 94 (H) Thousands/µL      Immature Grans Absolute 0 11 Thousand/uL      Lymphocytes Absolute 3 36 Thousands/µL      Monocytes Absolute 0 50 Thousand/µL      Eosinophils Absolute 0 05 Thousand/µL      Basophils Absolute 0 05 Thousands/µL                  XR chest 1 view portable    (Results Pending)              Procedures  ECG 12 Lead Documentation  Date/Time: 10/27/2018 10:55 AM  Performed by: Christal Lopez  Authorized by: Christal Lopez     ECG reviewed by me, the ED Provider: yes    Patient location:  ED  Rate:     ECG rate assessment: tachycardic    Rhythm:     Rhythm: sinus tachycardia    T waves:     T waves: normal             Phone Contacts  ED Phone Contact    ED Course                               MDM  Number of Diagnoses or Management Options  Diagnosis management comments: Weakness and fatigue differential includes electrolyte abnormality versus low hemoglobin secondary to GI bleed with melanotic stools upper versus lower       Amount and/or Complexity of Data Reviewed  Clinical lab tests: ordered  Tests in the radiology section of CPT®: ordered      CritCare Time    Disposition  Final diagnoses:   GI bleed     Time reflects when diagnosis was documented in both MDM as applicable and the Disposition within this note     Time User Action Codes Description Comment    10/27/2018 11:54 AM Jefferson Rico Add [K92 2] GI bleed       ED Disposition     ED Disposition Condition Comment    Admit  Case was discussed with *Dr Ángela Ma** and the patient's admission status was agreed to be Admission Status: inpatient status to the service of Dr Hu Can   Follow-up Information    None         Patient's Medications   Discharge Prescriptions    No medications on file     No discharge procedures on file      ED Provider  Electronically Signed by           Pa Sanders DO  10/27/18 4765

## 2018-10-27 NOTE — PROGRESS NOTES
[address] [facility]        NAME: Claudette Harris is a 59 y o  female  : 1955    MRN: [id number]  DATE: 2018  TIME: 9:54 AM    Assessment and Plan   Fatigue, unspecified type [R53 83]  1  Fatigue, unspecified type  POCT blood glucose    Transfer to other facility   2  Black stools       Patient sent to ER for IV fluids, labs  Patient Instructions       Go immediately to ER    Chief Complaint     Chief Complaint   Patient presents with    Fatigue     Sugar high at 400+  Checks blood glucose often  Feels dehydrated, states slight foggy,  feels weak & pale  States constipation and then for last 2 days dark stool/slightly dark red  Has lower abd cramping  Has undiagnosed IBS  Hx) hemrroids  History of Present Illness       Patient has a history of diabetes, IBS, hypertension  Patient states she has been under lot of stress recently  Patient states her  recently passed away  Patient states that she has been taking care of the house by herself and has been working multiple jobs  Patient states that she has not been taking care of herself and has not been going to Dr   Patient states that over the past 2 weeks her blood sugars have been over 450  Most recent blood sugar this morning was over 450  In exam room blood sugar was above 250  Patient states that she has been and dehydrated  Urinating frequently  Dry mouth  Patient states she has intermittent dizziness  No dizziness today but last night at work she was very fatigued and dizzy  Patient states she also has mild lower abdominal cramping  6/10 pain  Patient states that this morning she had a loose bowel movement that was black with red tinge to it  Patient did not take Pepto  Patient has a history of hemorrhoids but states that this is different  Review of Systems   Review of Systems   Constitutional: Negative for chills, diaphoresis, fatigue and fever  HENT: Negative    Negative for congestion, ear pain, rhinorrhea, sinus pressure and voice change  Eyes: Negative  Respiratory: Negative  Negative for cough, chest tightness, shortness of breath and wheezing  Cardiovascular: Negative for chest pain and palpitations  Gastrointestinal: Positive for abdominal pain and blood in stool  Negative for abdominal distention, constipation, diarrhea, nausea, rectal pain and vomiting  Endocrine: Negative  Genitourinary: Positive for frequency  Negative for dysuria, vaginal bleeding, vaginal discharge and vaginal pain  Musculoskeletal: Negative for back pain, myalgias and neck pain  Skin: Positive for pallor  Negative for rash  Allergic/Immunologic: Negative  Neurological: Negative for dizziness, syncope, weakness, light-headedness and headaches  Hematological: Negative  Psychiatric/Behavioral: Negative  Current Medications       Current Outpatient Prescriptions:     atenolol (TENORMIN) 50 mg tablet, Take by mouth, Disp: , Rfl:     lisinopril (ZESTRIL) 2 5 mg tablet, Take by mouth, Disp: , Rfl:     Current Allergies     Allergies as of 10/27/2018 - Reviewed 10/27/2018   Allergen Reaction Noted    Corticosteroids Anxiety 10/05/2016            The following portions of the patient's history were reviewed and updated as appropriate: allergies, current medications, past family history, past medical history, past social history, past surgical history and problem list      No past medical history on file  No past surgical history on file  No family history on file  Medications have been verified  Objective   /86   Pulse (!) 114   Temp 99 5 °F (37 5 °C)   Resp 18   Ht 5' 4" (1 626 m)   Wt 83 5 kg (184 lb)   SpO2 95%   BMI 31 58 kg/m²        Physical Exam     Physical Exam   Constitutional: She is oriented to person, place, and time  She appears well-developed and well-nourished  No distress  HENT:   Head: Normocephalic and atraumatic     Right Ear: External ear normal    Left Ear: External ear normal    Nose: Nose normal    Mouth/Throat: Oropharynx is clear and moist  No oropharyngeal exudate  Eyes: Pupils are equal, round, and reactive to light  Conjunctivae and EOM are normal  Right eye exhibits no discharge  Left eye exhibits no discharge  No scleral icterus  Neck: Normal range of motion  Neck supple  Cardiovascular: Normal rate, regular rhythm, normal heart sounds and intact distal pulses  Pulmonary/Chest: Effort normal and breath sounds normal  No respiratory distress  She has no wheezes  She has no rales  Abdominal: Soft  Bowel sounds are normal  She exhibits no distension  There is no tenderness  There is no rebound  Musculoskeletal: She exhibits no edema or deformity  Lymphadenopathy:     She has no cervical adenopathy  Neurological: She is alert and oriented to person, place, and time  No cranial nerve deficit  She exhibits normal muscle tone  Coordination normal    Skin: Skin is warm  No rash noted  She is not diaphoretic  There is pallor  Nursing note and vitals reviewed

## 2018-10-27 NOTE — PROGRESS NOTES
Pt received from ER  AAOx4  CBC, trop, EKG done  Dr Matt Page made aware of Hgb result  Pt c/o fatigue and lower abd cramping  Pt otherwise stable at this time  Call bell within reach  Pt able to make needs known  See flowsheet for assessment

## 2018-10-27 NOTE — ED NOTES
Called ICU to attempt report  Informed bed not ready, continue to hold patient        Wil Montesinos RN  10/27/18 1255

## 2018-10-27 NOTE — PROGRESS NOTES
No symptoms of transfusion reaction after 15 mins  Will give lasix dose between units of RBC  Pt stable  Denies needs at this time

## 2018-10-27 NOTE — LETTER
[address] [facility] [id number]  Dept: [phone]    October 29, 2018     Patient: Delfin Hoyos   YOB: 1955   Date of Visit: 10/27/2018       To Whom it May Concern:    Zane Donte is under my professional care  She was seen in the hospital from 10/27/2018   to 10/29/18  She may return to work on  10/30/2018  If you have any questions or concerns, please don't hesitate to call           Sincerely,          Jeffy Squires, [facility]

## 2018-10-27 NOTE — ED NOTES
Patient bed not available at this time  RN to update  Patient is NPO and comfortable       Maldonado Villalta RN  10/27/18 1106

## 2018-10-28 ENCOUNTER — ANESTHESIA EVENT (INPATIENT)
Dept: PERIOP | Facility: HOSPITAL | Age: 63
DRG: 241 | End: 2018-10-28
Payer: COMMERCIAL

## 2018-10-28 ENCOUNTER — ANESTHESIA (INPATIENT)
Dept: PERIOP | Facility: HOSPITAL | Age: 63
DRG: 241 | End: 2018-10-28
Payer: COMMERCIAL

## 2018-10-28 PROBLEM — K86.89 DILATION OF PANCREATIC DUCT: Status: ACTIVE | Noted: 2018-10-28

## 2018-10-28 PROBLEM — K26.9 DUODENAL ULCER: Status: ACTIVE | Noted: 2018-10-28

## 2018-10-28 PROBLEM — K25.3 ACUTE GASTRIC ULCER WITHOUT HEMORRHAGE OR PERFORATION: Status: ACTIVE | Noted: 2018-10-28

## 2018-10-28 PROBLEM — K92.1 MELENA: Status: ACTIVE | Noted: 2018-10-27

## 2018-10-28 LAB
ABO GROUP BLD BPU: NORMAL
ABO GROUP BLD BPU: NORMAL
ALBUMIN SERPL BCP-MCNC: 2.8 G/DL (ref 3.5–5)
ALP SERPL-CCNC: 50 U/L (ref 46–116)
ALT SERPL W P-5'-P-CCNC: 21 U/L (ref 12–78)
ANION GAP SERPL CALCULATED.3IONS-SCNC: 9 MMOL/L (ref 4–13)
AST SERPL W P-5'-P-CCNC: 10 U/L (ref 5–45)
BASOPHILS # BLD AUTO: 0.03 THOUSANDS/ΜL (ref 0–0.1)
BASOPHILS # BLD AUTO: 0.04 THOUSANDS/ΜL (ref 0–0.1)
BASOPHILS NFR BLD AUTO: 0 % (ref 0–1)
BASOPHILS NFR BLD AUTO: 0 % (ref 0–1)
BILIRUB SERPL-MCNC: 0.4 MG/DL (ref 0.2–1)
BPU ID: NORMAL
BPU ID: NORMAL
BUN SERPL-MCNC: 19 MG/DL (ref 5–25)
CALCIUM SERPL-MCNC: 7.8 MG/DL (ref 8.3–10.1)
CHLORIDE SERPL-SCNC: 108 MMOL/L (ref 100–108)
CO2 SERPL-SCNC: 24 MMOL/L (ref 21–32)
CREAT SERPL-MCNC: 0.64 MG/DL (ref 0.6–1.3)
CROSSMATCH: NORMAL
CROSSMATCH: NORMAL
EOSINOPHIL # BLD AUTO: 0.26 THOUSAND/ΜL (ref 0–0.61)
EOSINOPHIL # BLD AUTO: 0.29 THOUSAND/ΜL (ref 0–0.61)
EOSINOPHIL NFR BLD AUTO: 3 % (ref 0–6)
EOSINOPHIL NFR BLD AUTO: 3 % (ref 0–6)
ERYTHROCYTE [DISTWIDTH] IN BLOOD BY AUTOMATED COUNT: 13.8 % (ref 11.6–15.1)
ERYTHROCYTE [DISTWIDTH] IN BLOOD BY AUTOMATED COUNT: 14.4 % (ref 11.6–15.1)
ERYTHROCYTE [DISTWIDTH] IN BLOOD BY AUTOMATED COUNT: 14.7 % (ref 11.6–15.1)
FERRITIN SERPL-MCNC: 53 NG/ML (ref 8–388)
GFR SERPL CREATININE-BSD FRML MDRD: 96 ML/MIN/1.73SQ M
GLUCOSE SERPL-MCNC: 111 MG/DL (ref 65–140)
GLUCOSE SERPL-MCNC: 113 MG/DL (ref 65–140)
GLUCOSE SERPL-MCNC: 163 MG/DL (ref 65–140)
GLUCOSE SERPL-MCNC: 165 MG/DL (ref 65–140)
GLUCOSE SERPL-MCNC: 168 MG/DL (ref 65–140)
GLUCOSE SERPL-MCNC: 172 MG/DL (ref 65–140)
HCT VFR BLD AUTO: 26.3 % (ref 34.8–46.1)
HCT VFR BLD AUTO: 27 % (ref 34.8–46.1)
HCT VFR BLD AUTO: 27.5 % (ref 34.8–46.1)
HGB BLD-MCNC: 8.8 G/DL (ref 11.5–15.4)
HGB BLD-MCNC: 9 G/DL (ref 11.5–15.4)
HGB BLD-MCNC: 9 G/DL (ref 11.5–15.4)
IMM GRANULOCYTES # BLD AUTO: 0.06 THOUSAND/UL (ref 0–0.2)
IMM GRANULOCYTES # BLD AUTO: 0.08 THOUSAND/UL (ref 0–0.2)
IMM GRANULOCYTES NFR BLD AUTO: 1 % (ref 0–2)
IMM GRANULOCYTES NFR BLD AUTO: 1 % (ref 0–2)
IRON SATN MFR SERPL: 33 %
IRON SERPL-MCNC: 90 UG/DL (ref 50–170)
LYMPHOCYTES # BLD AUTO: 3.09 THOUSANDS/ΜL (ref 0.6–4.47)
LYMPHOCYTES # BLD AUTO: 3.71 THOUSANDS/ΜL (ref 0.6–4.47)
LYMPHOCYTES NFR BLD AUTO: 34 % (ref 14–44)
LYMPHOCYTES NFR BLD AUTO: 35 % (ref 14–44)
MCH RBC QN AUTO: 30.7 PG (ref 26.8–34.3)
MCH RBC QN AUTO: 30.9 PG (ref 26.8–34.3)
MCH RBC QN AUTO: 31.1 PG (ref 26.8–34.3)
MCHC RBC AUTO-ENTMCNC: 32.7 G/DL (ref 31.4–37.4)
MCHC RBC AUTO-ENTMCNC: 33.3 G/DL (ref 31.4–37.4)
MCHC RBC AUTO-ENTMCNC: 33.5 G/DL (ref 31.4–37.4)
MCV RBC AUTO: 92 FL (ref 82–98)
MCV RBC AUTO: 93 FL (ref 82–98)
MCV RBC AUTO: 95 FL (ref 82–98)
MONOCYTES # BLD AUTO: 0.45 THOUSAND/ΜL (ref 0.17–1.22)
MONOCYTES # BLD AUTO: 0.62 THOUSAND/ΜL (ref 0.17–1.22)
MONOCYTES NFR BLD AUTO: 5 % (ref 4–12)
MONOCYTES NFR BLD AUTO: 6 % (ref 4–12)
NEUTROPHILS # BLD AUTO: 4.84 THOUSANDS/ΜL (ref 1.85–7.62)
NEUTROPHILS # BLD AUTO: 6.26 THOUSANDS/ΜL (ref 1.85–7.62)
NEUTS SEG NFR BLD AUTO: 56 % (ref 43–75)
NEUTS SEG NFR BLD AUTO: 56 % (ref 43–75)
NRBC BLD AUTO-RTO: 0 /100 WBCS
NRBC BLD AUTO-RTO: 0 /100 WBCS
PLATELET # BLD AUTO: 173 THOUSANDS/UL (ref 149–390)
PLATELET # BLD AUTO: 192 THOUSANDS/UL (ref 149–390)
PLATELET # BLD AUTO: 201 THOUSANDS/UL (ref 149–390)
PMV BLD AUTO: 9.8 FL (ref 8.9–12.7)
PMV BLD AUTO: 9.8 FL (ref 8.9–12.7)
PMV BLD AUTO: 9.9 FL (ref 8.9–12.7)
POTASSIUM SERPL-SCNC: 4.1 MMOL/L (ref 3.5–5.3)
PROT SERPL-MCNC: 5.1 G/DL (ref 6.4–8.2)
RBC # BLD AUTO: 2.83 MILLION/UL (ref 3.81–5.12)
RBC # BLD AUTO: 2.91 MILLION/UL (ref 3.81–5.12)
RBC # BLD AUTO: 2.93 MILLION/UL (ref 3.81–5.12)
SODIUM SERPL-SCNC: 141 MMOL/L (ref 136–145)
TIBC SERPL-MCNC: 272 UG/DL (ref 250–450)
TROPONIN I SERPL-MCNC: <0.02 NG/ML
UNIT DISPENSE STATUS: NORMAL
UNIT DISPENSE STATUS: NORMAL
UNIT PRODUCT CODE: NORMAL
UNIT PRODUCT CODE: NORMAL
UNIT RH: NORMAL
UNIT RH: NORMAL
WBC # BLD AUTO: 11 THOUSAND/UL (ref 4.31–10.16)
WBC # BLD AUTO: 8.73 THOUSAND/UL (ref 4.31–10.16)
WBC # BLD AUTO: 9 THOUSAND/UL (ref 4.31–10.16)

## 2018-10-28 PROCEDURE — 85027 COMPLETE CBC AUTOMATED: CPT | Performed by: INTERNAL MEDICINE

## 2018-10-28 PROCEDURE — 83540 ASSAY OF IRON: CPT | Performed by: INTERNAL MEDICINE

## 2018-10-28 PROCEDURE — C9113 INJ PANTOPRAZOLE SODIUM, VIA: HCPCS | Performed by: INTERNAL MEDICINE

## 2018-10-28 PROCEDURE — 85025 COMPLETE CBC W/AUTO DIFF WBC: CPT | Performed by: INTERNAL MEDICINE

## 2018-10-28 PROCEDURE — 99233 SBSQ HOSP IP/OBS HIGH 50: CPT | Performed by: INTERNAL MEDICINE

## 2018-10-28 PROCEDURE — 82948 REAGENT STRIP/BLOOD GLUCOSE: CPT

## 2018-10-28 PROCEDURE — C9113 INJ PANTOPRAZOLE SODIUM, VIA: HCPCS | Performed by: EMERGENCY MEDICINE

## 2018-10-28 PROCEDURE — 82728 ASSAY OF FERRITIN: CPT | Performed by: INTERNAL MEDICINE

## 2018-10-28 PROCEDURE — 83550 IRON BINDING TEST: CPT | Performed by: INTERNAL MEDICINE

## 2018-10-28 PROCEDURE — 84484 ASSAY OF TROPONIN QUANT: CPT | Performed by: INTERNAL MEDICINE

## 2018-10-28 PROCEDURE — 0DJ08ZZ INSPECTION OF UPPER INTESTINAL TRACT, VIA NATURAL OR ARTIFICIAL OPENING ENDOSCOPIC: ICD-10-PCS | Performed by: INTERNAL MEDICINE

## 2018-10-28 PROCEDURE — 80053 COMPREHEN METABOLIC PANEL: CPT | Performed by: INTERNAL MEDICINE

## 2018-10-28 RX ORDER — MIDAZOLAM HYDROCHLORIDE 1 MG/ML
INJECTION INTRAMUSCULAR; INTRAVENOUS AS NEEDED
Status: DISCONTINUED | OUTPATIENT
Start: 2018-10-28 | End: 2018-10-28 | Stop reason: SURG

## 2018-10-28 RX ORDER — LISINOPRIL 5 MG/1
5 TABLET ORAL DAILY
Status: DISCONTINUED | OUTPATIENT
Start: 2018-10-28 | End: 2018-10-29 | Stop reason: HOSPADM

## 2018-10-28 RX ORDER — PANTOPRAZOLE SODIUM 40 MG/1
40 INJECTION, POWDER, FOR SOLUTION INTRAVENOUS EVERY 12 HOURS SCHEDULED
Status: DISCONTINUED | OUTPATIENT
Start: 2018-10-28 | End: 2018-10-29 | Stop reason: HOSPADM

## 2018-10-28 RX ORDER — PROPOFOL 10 MG/ML
INJECTION, EMULSION INTRAVENOUS AS NEEDED
Status: DISCONTINUED | OUTPATIENT
Start: 2018-10-28 | End: 2018-10-28 | Stop reason: SURG

## 2018-10-28 RX ADMIN — INSULIN LISPRO 1 UNITS: 100 INJECTION, SOLUTION INTRAVENOUS; SUBCUTANEOUS at 05:55

## 2018-10-28 RX ADMIN — MIDAZOLAM HYDROCHLORIDE 2 MG: 1 INJECTION, SOLUTION INTRAMUSCULAR; INTRAVENOUS at 10:05

## 2018-10-28 RX ADMIN — SODIUM CHLORIDE 8 MG/HR: 9 INJECTION, SOLUTION INTRAVENOUS at 08:10

## 2018-10-28 RX ADMIN — PANTOPRAZOLE SODIUM 40 MG: 40 INJECTION, POWDER, FOR SOLUTION INTRAVENOUS at 16:25

## 2018-10-28 RX ADMIN — PROPOFOL 150 MG: 10 INJECTION, EMULSION INTRAVENOUS at 10:15

## 2018-10-28 RX ADMIN — SODIUM CHLORIDE 75 ML/HR: 0.9 INJECTION, SOLUTION INTRAVENOUS at 20:30

## 2018-10-28 RX ADMIN — INSULIN LISPRO 1 UNITS: 100 INJECTION, SOLUTION INTRAVENOUS; SUBCUTANEOUS at 00:37

## 2018-10-28 RX ADMIN — SODIUM CHLORIDE: 0.9 INJECTION, SOLUTION INTRAVENOUS at 09:45

## 2018-10-28 NOTE — PROGRESS NOTES
Called Dr Shanta Christina  Requested MAR hold be released, per Dr Shanta Christina, she will log in and correct the hold    Also, confirmed that she notified patient that an MRI was ordered and discussed the indication for the test   Per Dr Shanta Christina she did communicate briefly the indication for the test

## 2018-10-28 NOTE — OP NOTE
OPERATIVE REPORT  PATIENT NAME: Froylan Powell    :  1955  MRN: [id number]  Pt Location:  GI ROOM 01    ESOPHAGOGASTRODUODENOSCOPY    PROCEDURE: EGD    SEDATION: Monitored anesthesia care, check anesthesia records    ASA Class: 2    INDICATIONS: melena, acute blood loss anemia    CONSENT:  Informed consent was obtained for the procedure, including sedation after explaining the risks and benefits of the procedure  Risks including but not limited to bleeding, perforation, infection, and missed lesion  PREPARATION:   Telemetry, pulse oximetry, blood pressure were monitored throughout the procedure  Patient was identified by myself both verbally and by visual inspection of ID band  DESCRIPTION:   Patient was placed in the left lateral decubitus position and was sedated with the above medication  The gastroscope was introduced in to the oropharynx and the esophagus was intubated under direct visualization  Scope was passed down the esophagus up to 2nd part of the duodenum  A careful inspection was made as the gastroscope was withdrawn, including a retroflexed view of the stomach; findings and interventions are described below  FINDINGS:    #1  Esophagus- normal    #2  Stomach- multiple large antral cratered NONBLEEDING ulcers  No stigmata on the ulcers  #3  Duodenum- 2 small cratered duodenal bulb ulcers  Nonbleeding without stigmata of recent bleed  Bile in the second portion of the duodenum  IMPRESSIONS:      Multiple cratered ulcers in the gastric antrum and the duodenal bulb  No stigmata requiring intervention  No blood noted  RECOMMENDATIONS:     Avoid NSAIDS  PPI BID  Adv to clear liquids today  Continue to monitor H&H q8hrs and transfuse to hgb >8  Resume home meds  Repeat EGD in 2-3 months to follow up on the gastric ulcers  Check H pylori stool antigen  COMPLICATIONS:  None; patient tolerated the procedure well            DISPOSITION: PACU           CONDITION: Stable    SIGNATURE: Albin Mckeon MD  DATE: October 28, 2018  TIME: 10:14 AM

## 2018-10-28 NOTE — UTILIZATION REVIEW
Initial Clinical Review    Admission: Date/Time/Statement: 10/27/18 @ 1155     Orders Placed This Encounter   Procedures    Inpatient Admission (expected length of stay for this patient is greater than two midnights)     Standing Status:   Standing     Number of Occurrences:   1     Order Specific Question:   Admitting Physician     Answer:   Niranjan Herron [[id number]]     Order Specific Question:   Level of Care     Answer:   Level 1 Stepdown [13]     Order Specific Question:   Estimated length of stay     Answer:   More than 2 Midnights     Order Specific Question:   Certification     Answer:   I certify that inpatient services are medically necessary for this patient for a duration of greater than two midnights  See H&P and MD Progress Notes for additional information about the patient's course of treatment  ED: Date/Time/Mode of Arrival:   ED Arrival Information     Expected Arrival Acuity Means of Arrival Escorted By Service Admission Type    10/27/2018  10/27/2018 10:00 Urgent Walk-In Self General Medicine Urgent    Arrival Complaint    fatigue          Chief Complaint:   Chief Complaint   Patient presents with    Fatigue     patient presents to the ED with c/o fatigue, chills, and SOB for the past two days  Patient states she is under an unusual amount of stress  History of Illness: Carren Eisenmenger is an 59 y o  female      HPI:  Who presents to the ER with worsening lower abdominal cramping off and on for the last year which she blamed on  bowel issues  She has never had a colonoscopy but had seen GI in [place] remotely in the past   She reports increased lower abdominal cramps  and melena for the past 24 hours-she had dark stools with some minimal bright red blood this morning prompting her to come to the ER  She relates that she had resumed aspirin approximately a week ago for arthritis issues and also uses Tylenol on a regular basis    She complained of some lightheadedness initially on presentation  Is mildly tachycardic at 114 and blood pressure is stable 126/59    ED Vital Signs:   ED Triage Vitals [10/27/18 1015]   Temperature Pulse Respirations Blood Pressure SpO2   98 8 °F (37 1 °C) (!) 112 20 128/54 98 %      Temp Source Heart Rate Source Patient Position - Orthostatic VS BP Location FiO2 (%)   Temporal Monitor Sitting Left arm --      Pain Score       4        Wt Readings from Last 1 Encounters:   10/27/18 83 3 kg (183 lb 10 3 oz)       Vital Signs (abnormal):   above    Abnormal Labs/Diagnostic Test Results:    BUN  39  Albumin  3 2  WBC   12 01  H/H  8 4/25 3   (  10/27)               8  8/26 3  ( 10/28)  Abs  neutro    7 94  Ct  Abd/pelvis:     No acute abdominopelvic findings  2   Focal dilation of the main pancreatic duct to 6 mm at the head and neck of the pancreas raises concern for main duct  IPMN   No pancreatic head mass or gland atrophy   The finding warrants further evaluation with nonurgent follow-up pancreatic   MRI/MRCP  3  Becker Cardinal appears relatively hypoenhancing, which could suggest steatosis   However, specificity is decreased on postcontrast studies   The above recommended MRI could confirm or refute the finding      ED Treatment:   Medication Administration from 10/27/2018 0956 to 10/27/2018 1504       Date/Time Order Dose Route Action Action by Comments     10/27/2018 1336 sodium chloride 0 9 % bolus 1,000 mL 0 mL Intravenous Stopped Pramod Bennett RN      10/27/2018 1039 sodium chloride 0 9 % bolus 1,000 mL 1,000 mL Intravenous New Bag Rohan Nicole RN      10/27/2018 1039 pantoprazole (PROTONIX) injection 40 mg 40 mg Intravenous Given Rohan Nicole RN      10/27/2018 1042 pantoprazole (PROTONIX) 80 mg in sodium chloride 0 9 % 100 mL infusion 8 mg/hr Intravenous New Bag Jerzy Bennett RN      10/27/2018 1443 iohexol (OMNIPAQUE) 350 MG/ML injection (SINGLE-DOSE) 100 mL 100 mL Intravenous Given Jyoti Cantu      10/27/2018 1444 iohexol (OMNIPAQUE) 240 MG/ML solution 50 mL 50 mL Oral Given Almaz Stokes           Past Medical/Surgical History: Active Ambulatory Problems     Diagnosis Date Noted    No Active Ambulatory Problems     Resolved Ambulatory Problems     Diagnosis Date Noted    No Resolved Ambulatory Problems     Past Medical History:   Diagnosis Date    Diabetes mellitus ([facility] [address] )     Hypertension     Irritable bowel disease     Vertigo        Admitting Diagnosis: Melena [K92 1]  Acute blood loss anemia [D62]  Fatigue [R53 83]  GI bleed [K92 2]  Acute GI bleeding [K92 2]  Lower abdominal pain [R10 30]    Age/Sex: 59 y o  female    Assessment/Plan:  Acute GI bleed-consult to gi and will keep NPO for now-differential includes upper GI source with her longstanding history of GERD and resuming aspirin recently  Also having lower abdominal pain and cramping Will and diverticular source may be a cause-will order CT scan of the abdomen pelvis  2  Hyperglycemia-greater than 400 previously been listed as prediabetic but she truly has diabetes mellitus type 2 with these high readings-place on coverage for now but may need meds instituted in the near future  3    Hypertension-watch serial reading    Admission Orders:   IP  10/27  @    1155  Scheduled Meds:   Current Facility-Administered Medications:  acetaminophen 650 mg Oral Q6H PRN Sonya Fly, DO    insulin lispro 1-5 Units Subcutaneous Q6H [address] Sonya Fly, DO    lisinopril 5 mg Oral Daily Sonya Fly, DO    ondansetron 4 mg Intravenous Q6H PRN Sonya Fly, DO    pantoprozole (PROTONIX) infusion (Continuous) 8 mg/hr Intravenous Continuous Lotus Mcknight, DO Last Rate: 8 mg/hr (10/28/18 0810)   sodium chloride 125 mL/hr Intravenous Continuous Sonya Fly, DO Last Rate: 125 mL/hr (10/27/18 2319)     Continuous Infusions:   pantoprozole (PROTONIX) infusion (Continuous) 8 mg/hr Last Rate: 8 mg/hr (10/28/18 0810)   sodium chloride 125 mL/hr Last Rate: 125 mL/hr (10/27/18 2319)     PRN Meds:   acetaminophen    ondansetron NPO  Tele  Cons  GI  hmgb   Q 8 hrs  Serial troponin  MRI  Abdomen    Per  GI  Consult:  Anemia - unclear baseline as no previous lab work seen and pt has not really had good medical follow up for several years  However, given the "dark stools" in setting of lower abd cramping and recent daily ASA 325mg, will need to rule out PUD  She has never had a colonoscopy so if EGD unremarkable, will need to proceed to rule out colon diverticular disease, angioectasias/malignancies  Hemodynamically stable with no further bleeding and rectal vault with some dark brown stool  FOBT +      2  Lower abd cramping and leukocytosis in setting of possible constipation - pt's history is also however concerning for diverticulitis given the 2 days of cramping and elev WBC in ER  So before any endoscopic evaluation, will need to rule out diverticulitis      PLAN:  Hold ASA 325mg  Check H&H q8hrs and transfuse to hgb >8  Check CT today to rule out diverticulitis and other abd etiologies for the pain/leukocytosis  PPI IV BID  NPO for now  Characterize the anemia with iron panel since we do not know her baseline  Pending above, will proceed with EGD Monday to give time for the aspirin 325 to wear off, or more urgently if needed  PROGRESS  NOTE  10/28  · Acute GI bleed with anemia-patient for EGD as per GI note and eventual colonoscopy-lower abdominal pain has decreased somewhat today-H&H 8 8/26 3  · Dilated pancreatic duct on CT -will need MRCP ordered  · Diabetes mellitus type 2 fasting glucose 172-on dietary control as outpatient-consider starting metformin when taking p o  · Hypertension-elevated readings today will start on lisinopril 5 mg daily    Thank you,  [facility] Review Department  Phone: [phone]; Fax [phone]  ATTENTION: Please call with any questions or concerns to [phone]  and carefully follow the prompts so that you are directed to the right person     Send all requests for admission clinical reviews, approved or denied determinations and any other requests to fax [phone]   All voicemails are confidential

## 2018-10-28 NOTE — PROGRESS NOTES
Progress Note - Sue Ocampo 59 y o  female MRN: [id number]    Unit/Bed#: -02 Encounter: [id number]      Assessment:  Principal Problem:    Acute GI bleeding  Active Problems:    Lower abdominal pain    Type 2 diabetes mellitus with hyperglycemia, without long-term current use of insulin (HCC)    GERD (gastroesophageal reflux disease)    Hypertension    Dilation of pancreatic duct    Class 1 obesity due to excess calories without serious comorbidity with body mass index (BMI) of 30 0 to 30 9 in adult  Resolved Problems:    * No resolved hospital problems  *        Plan:  · Acute GI bleed with anemia-patient for EGD as per GI note and eventual colonoscopy-lower abdominal pain has decreased somewhat today-H&H 8 8/26 3  · Dilated pancreatic duct on CT -will need MRCP ordered  · Diabetes mellitus type 2 fasting glucose 172-on dietary control as outpatient-consider starting metformin when taking p o  · Hypertension-elevated readings today will start on lisinopril 5 mg daily    Subjective:   Patient with less lower abdominal pain today  Some mild nausea  No vomiting  ROS  Comprehensive system review negative other than noted above    Objective:     Vitals: Blood pressure 158/76, pulse 79, temperature 98 °F (36 7 °C), temperature source Oral, resp  rate 19, height 5' 4" (1 626 m), weight 83 3 kg (183 lb 10 3 oz), SpO2 96 %  ,Body mass index is 31 52 kg/m²    Current Facility-Administered Medications   Medication Dose Route Frequency Provider Last Rate Last Dose    acetaminophen (TYLENOL) tablet 650 mg  650 mg Oral Q6H PRN Sonya Fly, DO        insulin lispro (HumaLOG) 100 units/mL subcutaneous injection 1-5 Units  1-5 Units Subcutaneous Q6H [address] Sonya Fly, DO   1 Units at 10/28/18 0555    ondansetron (ZOFRAN) injection 4 mg  4 mg Intravenous Q6H PRN Sonya Fly, DO        pantoprazole (PROTONIX) 80 mg in sodium chloride 0 9 % 100 mL infusion  8 mg/hr Intravenous Continuous Norval Fairdale, DO 10 mL/hr at 10/28/18 0810 8 mg/hr at 10/28/18 0810    sodium chloride 0 9 % infusion  125 mL/hr Intravenous Continuous Sonya Fly  mL/hr at 10/27/18 2319 125 mL/hr at 10/27/18 2319     Prescriptions Prior to Admission   Medication    acetaminophen (TYLENOL) 325 mg tablet    Ascorbic Acid (VITAMIN C) 100 MG tablet    aspirin 325 mg tablet    b complex vitamins capsule    cholecalciferol (VITAMIN D3) 400 units tablet    vitamin A 7500 UNIT capsule         Intake/Output Summary (Last 24 hours) at 10/28/18 0858  Last data filed at 10/27/18 2315   Gross per 24 hour   Intake              700 ml   Output              400 ml   Net              300 ml       Physical Exam:  General appearance: alert and cooperative  Neck: supple, symmetrical, trachea midline  Lungs: clear to auscultation bilaterally  Heart: regular rate and rhythm, S1, S2 normal, no murmur, click, rub or gallop  Abdomen: Mild midepigastric tenderness to palpation no guarding  Lower abdominal tenderness has improved some  Extremities: extremities normal, warm and well-perfused; no cyanosis, clubbing, or edema  Skin: Skin color, texture, turgor normal  No rashes or lesions  Neurologic:  No focal deficits       Lab, Imaging and other studies: I have personally reviewed pertinent reports              Results from last 7 days  Lab Units 10/28/18  0541 10/28/18  0011 10/27/18  1525 10/27/18  1032   WBC Thousand/uL 9 00 11 00* 10 12 12 01*   HEMOGLOBIN g/dL 8 8* 9 0* 7 1* 8 4*   HEMATOCRIT % 26 3* 27 0* 21 3* 25 3*   PLATELETS Thousands/uL 192 201 190 234   NEUTROS PCT %  --  56 52 67   LYMPHS PCT %  --  34 40 28   MONOS PCT %  --  6 6 4   EOS PCT %  --  3 1 0       Results from last 7 days  Lab Units 10/28/18  0541 10/27/18  1032   SODIUM mmol/L 141 139   POTASSIUM mmol/L 4 1 4 3   CHLORIDE mmol/L 108 105   CO2 mmol/L 24 23   BUN mg/dL 19 39*   CREATININE mg/dL 0 64 0 75   CALCIUM mg/dL 7 8* 8 4   ALK PHOS U/L 50 58   ALT U/L 21 23   AST U/L 10 7     Lab Results   Component Value Date    TROPONINI <0 02 10/28/2018    TROPONINI <0 02 10/27/2018    TROPONINI <0 02 10/27/2018       Results from last 7 days  Lab Units 10/27/18  1032   INR  1 15     Lab Results   Component Value Date    URINECX >100,000 cfu/ml Escherichia coli 10/06/2016       Imaging:  No results found for this or any previous visit  No results found for this or any previous visit  PATIENT CENTERED ROUNDS: I have performed rounds with the nursing staff            Larry Wong DO

## 2018-10-28 NOTE — ANESTHESIA PREPROCEDURE EVALUATION
Review of Systems/Medical History  Patient summary reviewed  Chart reviewed      Cardiovascular  EKG reviewed, Hypertension controlled,    Pulmonary  Sleep apnea ,        GI/Hepatic    GI bleeding , active and history of, PUD, GERD , Pancreatic problem,        Negative  ROS        Endo/Other  Diabetes type 2 ,      GYN  Negative gynecology ROS          Hematology  Anemia acute blood loss anemia,     Musculoskeletal    Arthritis     Neurology  Negative neurology ROS      Psychology   Negative psychology ROS              Physical Exam    Airway    Mallampati score: I  TM Distance: >3 FB  Neck ROM: full     Dental   upper dentures,     Cardiovascular  Rhythm: regular, Rate: normal, Cardiovascular exam normal    Pulmonary  Decreased breath sounds,     Other Findings        Anesthesia Plan  ASA Score- 3 Emergent    Anesthesia Type- IV sedation with anesthesia with ASA Monitors  Additional Monitors:   Airway Plan:         Plan Factors-    Induction- intravenous  Postoperative Plan-     Informed Consent- Anesthetic plan and risks discussed with patient

## 2018-10-28 NOTE — ANESTHESIA POSTPROCEDURE EVALUATION
Post-Op Assessment Note      CV Status:  Stable    Mental Status:  Alert and awake    Hydration Status:  Euvolemic    PONV Controlled:  Controlled    Airway Patency:  Patent    Post Op Vitals Reviewed: Yes          Staff: Anesthesiologist           BP 98/52 (10/28/18 1028)    Temp 98 2 °F (36 8 °C) (10/28/18 1028)    Pulse 81 (10/28/18 1028)   Resp 16 (10/28/18 1028)    SpO2 95 % (10/28/18 1028)

## 2018-10-28 NOTE — PROGRESS NOTES
Progress Note - GI   Froylan Andre 59 y o  female MRN: [id number]  Unit/Bed#: OR POOL Encounter: [id number]      ASSESSMENT:  1  Anemia - unclear baseline as no previous lab work seen and pt has not really had good medical follow up for several years  However, given the "dark stools" in setting of lower abd cramping and recent daily ASA 325mg, will need to rule out PUD  CT showing dilated PD - so will also need to rule out duodenal ulcerations from ? Pancreatic source  She has never had a colonoscopy so if EGD unremarkable, will need to proceed to rule out colon diverticular disease, angioectasias/malignancies       2  Lower abd cramping and leukocytosis in setting of possible constipation - CT neg for acute abd etiologies but showing dil PD     3  Dilated panc duct - proceed to MRCP to further characterize         PLAN:  NO NSAIDS  Check H&H q8hrs and transfuse to hgb >8  PPI IV BID  NPO for now  Characterize the anemia with iron panel since we do not know her baseline  Will proceed with EGD today given ongoing bleeding  MRCP pending  Chief Complaint   Patient presents with    Fatigue     patient presents to the ED with c/o fatigue, chills, and SOB for the past two days  Patient states she is under an unusual amount of stress  SUBJECTIVE/HPI   more melena overnight and this morning  Got 2U PRBC without sig improvement in hgb  CT showing dil PD      /76 (BP Location: Left arm)   Pulse 79   Temp 98 °F (36 7 °C) (Oral)   Resp 19   Ht 5' 4" (1 626 m)   Wt 83 3 kg (183 lb 10 3 oz)   SpO2 96%   BMI 31 52 kg/m²       PHYSICALEXAM    General appearance: alert, appears stated age and cooperative  HEENT: Normocephalic, w/o obvious abnormality, atraumatic, pale conjunctiva, PERRL, EOM's intact  Throat: lips, mucosa, and tongue normal; teeth and gums normal  Neck: no adenopathy, no JVD, supple, symmetrical, trachea midline  Lungs: clear to auscultation bilaterally, No wheeze/rales  Heart: regular rate and rhythm, S1, S2 normal, no murmur  Abdomen: soft, non-tender; bowel sounds normal; no masses,  no organomegaly  Extremities: extremities normal, atraumatic, no cyanosis or edema  Skin: Skin color, texture, turgor normal  No rashes or lesions  Neurologic: Grossly normal      Lab Results   Component Value Date    CALCIUM 7 8 (L) 10/28/2018     10/28/2018    K 4 1 10/28/2018    CO2 24 10/28/2018     10/28/2018    BUN 19 10/28/2018    CREATININE 0 64 10/28/2018     Lab Results   Component Value Date    WBC 9 00 10/28/2018    HGB 8 8 (L) 10/28/2018    HCT 26 3 (L) 10/28/2018    MCV 93 10/28/2018     10/28/2018     Lab Results   Component Value Date    ALT 21 10/28/2018    AST 10 10/28/2018    ALKPHOS 50 10/28/2018     Lab Results   Component Value Date    INR 1 15 10/27/2018     No components found for: AMYLJKJJJASE  No results found for: LIPASE  Lab Results   Component Value Date    FERRITIN 46 10/27/2018           Yogesh Sands MD

## 2018-10-28 NOTE — PROGRESS NOTES
Dinner tray arrived  Protonix Given  Needs met  Denies pain  Call bell within reach  Rn cornelio BARNES Spoke to Dr Radha Pinedo about plan of care

## 2018-10-29 ENCOUNTER — APPOINTMENT (INPATIENT)
Dept: MRI IMAGING | Facility: HOSPITAL | Age: 63
DRG: 241 | End: 2018-10-29
Payer: COMMERCIAL

## 2018-10-29 VITALS
HEIGHT: 64 IN | SYSTOLIC BLOOD PRESSURE: 131 MMHG | OXYGEN SATURATION: 95 % | HEART RATE: 89 BPM | WEIGHT: 184.53 LBS | RESPIRATION RATE: 18 BRPM | TEMPERATURE: 97.9 F | BODY MASS INDEX: 31.5 KG/M2 | DIASTOLIC BLOOD PRESSURE: 62 MMHG

## 2018-10-29 LAB
ANION GAP SERPL CALCULATED.3IONS-SCNC: 9 MMOL/L (ref 4–13)
ATRIAL RATE: 106 BPM
BACTERIA UR CULT: NORMAL
BACTERIA UR QL AUTO: ABNORMAL /HPF
BASOPHILS # BLD AUTO: 0.02 THOUSANDS/ΜL (ref 0–0.1)
BASOPHILS # BLD AUTO: 0.03 THOUSANDS/ΜL (ref 0–0.1)
BASOPHILS NFR BLD AUTO: 0 % (ref 0–1)
BASOPHILS NFR BLD AUTO: 0 % (ref 0–1)
BILIRUB UR QL STRIP: NEGATIVE
BUN SERPL-MCNC: 11 MG/DL (ref 5–25)
CALCIUM SERPL-MCNC: 8.2 MG/DL (ref 8.3–10.1)
CHLORIDE SERPL-SCNC: 109 MMOL/L (ref 100–108)
CLARITY UR: CLEAR
CO2 SERPL-SCNC: 26 MMOL/L (ref 21–32)
COLOR UR: YELLOW
CREAT SERPL-MCNC: 0.57 MG/DL (ref 0.6–1.3)
EOSINOPHIL # BLD AUTO: 0.19 THOUSAND/ΜL (ref 0–0.61)
EOSINOPHIL # BLD AUTO: 0.28 THOUSAND/ΜL (ref 0–0.61)
EOSINOPHIL NFR BLD AUTO: 3 % (ref 0–6)
EOSINOPHIL NFR BLD AUTO: 4 % (ref 0–6)
ERYTHROCYTE [DISTWIDTH] IN BLOOD BY AUTOMATED COUNT: 14.6 % (ref 11.6–15.1)
ERYTHROCYTE [DISTWIDTH] IN BLOOD BY AUTOMATED COUNT: 14.9 % (ref 11.6–15.1)
ERYTHROCYTE [DISTWIDTH] IN BLOOD BY AUTOMATED COUNT: 15.1 % (ref 11.6–15.1)
GFR SERPL CREATININE-BSD FRML MDRD: 100 ML/MIN/1.73SQ M
GLUCOSE SERPL-MCNC: 117 MG/DL (ref 65–140)
GLUCOSE SERPL-MCNC: 118 MG/DL (ref 65–140)
GLUCOSE SERPL-MCNC: 136 MG/DL (ref 65–140)
GLUCOSE SERPL-MCNC: 153 MG/DL (ref 65–140)
GLUCOSE UR STRIP-MCNC: NEGATIVE MG/DL
HCT VFR BLD AUTO: 25.4 % (ref 34.8–46.1)
HCT VFR BLD AUTO: 26 % (ref 34.8–46.1)
HCT VFR BLD AUTO: 26.5 % (ref 34.8–46.1)
HGB BLD-MCNC: 8.3 G/DL (ref 11.5–15.4)
HGB BLD-MCNC: 8.6 G/DL (ref 11.5–15.4)
HGB BLD-MCNC: 8.8 G/DL (ref 11.5–15.4)
HGB UR QL STRIP.AUTO: NEGATIVE
IMM GRANULOCYTES # BLD AUTO: 0.04 THOUSAND/UL (ref 0–0.2)
IMM GRANULOCYTES # BLD AUTO: 0.04 THOUSAND/UL (ref 0–0.2)
IMM GRANULOCYTES NFR BLD AUTO: 1 % (ref 0–2)
IMM GRANULOCYTES NFR BLD AUTO: 1 % (ref 0–2)
KETONES UR STRIP-MCNC: NEGATIVE MG/DL
LEUKOCYTE ESTERASE UR QL STRIP: ABNORMAL
LYMPHOCYTES # BLD AUTO: 1.81 THOUSANDS/ΜL (ref 0.6–4.47)
LYMPHOCYTES # BLD AUTO: 2.48 THOUSANDS/ΜL (ref 0.6–4.47)
LYMPHOCYTES NFR BLD AUTO: 26 % (ref 14–44)
LYMPHOCYTES NFR BLD AUTO: 33 % (ref 14–44)
MCH RBC QN AUTO: 30.8 PG (ref 26.8–34.3)
MCH RBC QN AUTO: 30.9 PG (ref 26.8–34.3)
MCH RBC QN AUTO: 31 PG (ref 26.8–34.3)
MCHC RBC AUTO-ENTMCNC: 32.7 G/DL (ref 31.4–37.4)
MCHC RBC AUTO-ENTMCNC: 33.1 G/DL (ref 31.4–37.4)
MCHC RBC AUTO-ENTMCNC: 33.2 G/DL (ref 31.4–37.4)
MCV RBC AUTO: 93 FL (ref 82–98)
MCV RBC AUTO: 93 FL (ref 82–98)
MCV RBC AUTO: 95 FL (ref 82–98)
MONOCYTES # BLD AUTO: 0.4 THOUSAND/ΜL (ref 0.17–1.22)
MONOCYTES # BLD AUTO: 0.44 THOUSAND/ΜL (ref 0.17–1.22)
MONOCYTES NFR BLD AUTO: 6 % (ref 4–12)
MONOCYTES NFR BLD AUTO: 6 % (ref 4–12)
MUCOUS THREADS UR QL AUTO: ABNORMAL
NEUTROPHILS # BLD AUTO: 4.16 THOUSANDS/ΜL (ref 1.85–7.62)
NEUTROPHILS # BLD AUTO: 4.41 THOUSANDS/ΜL (ref 1.85–7.62)
NEUTS SEG NFR BLD AUTO: 56 % (ref 43–75)
NEUTS SEG NFR BLD AUTO: 64 % (ref 43–75)
NITRITE UR QL STRIP: NEGATIVE
NON-SQ EPI CELLS URNS QL MICRO: ABNORMAL /HPF
OTHER STN SPEC: ABNORMAL
P AXIS: 51 DEGREES
PH UR STRIP.AUTO: 5.5 [PH] (ref 4.5–8)
PLATELET # BLD AUTO: 198 THOUSANDS/UL (ref 149–390)
PLATELET # BLD AUTO: 200 THOUSANDS/UL (ref 149–390)
PLATELET # BLD AUTO: 210 THOUSANDS/UL (ref 149–390)
PMV BLD AUTO: 10 FL (ref 8.9–12.7)
PMV BLD AUTO: 9.5 FL (ref 8.9–12.7)
PMV BLD AUTO: 9.9 FL (ref 8.9–12.7)
POTASSIUM SERPL-SCNC: 3.8 MMOL/L (ref 3.5–5.3)
PR INTERVAL: 126 MS
PROT UR STRIP-MCNC: NEGATIVE MG/DL
QRS AXIS: 25 DEGREES
QRSD INTERVAL: 88 MS
QT INTERVAL: 346 MS
QTC INTERVAL: 459 MS
RBC # BLD AUTO: 2.68 MILLION/UL (ref 3.81–5.12)
RBC # BLD AUTO: 2.79 MILLION/UL (ref 3.81–5.12)
RBC # BLD AUTO: 2.85 MILLION/UL (ref 3.81–5.12)
RBC #/AREA URNS AUTO: ABNORMAL /HPF
SODIUM SERPL-SCNC: 144 MMOL/L (ref 136–145)
SP GR UR STRIP.AUTO: <=1.005 (ref 1–1.03)
T WAVE AXIS: 40 DEGREES
UROBILINOGEN UR QL STRIP.AUTO: 0.2 E.U./DL
VENTRICULAR RATE: 106 BPM
WBC # BLD AUTO: 6.78 THOUSAND/UL (ref 4.31–10.16)
WBC # BLD AUTO: 6.87 THOUSAND/UL (ref 4.31–10.16)
WBC # BLD AUTO: 7.43 THOUSAND/UL (ref 4.31–10.16)
WBC #/AREA URNS AUTO: ABNORMAL /HPF

## 2018-10-29 PROCEDURE — 85027 COMPLETE CBC AUTOMATED: CPT | Performed by: INTERNAL MEDICINE

## 2018-10-29 PROCEDURE — 76376 3D RENDER W/INTRP POSTPROCES: CPT

## 2018-10-29 PROCEDURE — 80048 BASIC METABOLIC PNL TOTAL CA: CPT | Performed by: INTERNAL MEDICINE

## 2018-10-29 PROCEDURE — 82948 REAGENT STRIP/BLOOD GLUCOSE: CPT

## 2018-10-29 PROCEDURE — 85025 COMPLETE CBC W/AUTO DIFF WBC: CPT | Performed by: INTERNAL MEDICINE

## 2018-10-29 PROCEDURE — A9585 GADOBUTROL INJECTION: HCPCS | Performed by: INTERNAL MEDICINE

## 2018-10-29 PROCEDURE — 74183 MRI ABD W/O CNTR FLWD CNTR: CPT

## 2018-10-29 PROCEDURE — C9113 INJ PANTOPRAZOLE SODIUM, VIA: HCPCS | Performed by: INTERNAL MEDICINE

## 2018-10-29 PROCEDURE — 99239 HOSP IP/OBS DSCHRG MGMT >30: CPT | Performed by: HOSPITALIST

## 2018-10-29 PROCEDURE — 93010 ELECTROCARDIOGRAM REPORT: CPT | Performed by: INTERNAL MEDICINE

## 2018-10-29 PROCEDURE — 81001 URINALYSIS AUTO W/SCOPE: CPT | Performed by: INTERNAL MEDICINE

## 2018-10-29 RX ORDER — OMEGA-3S/DHA/EPA/FISH OIL/D3 300MG-1000
400 CAPSULE ORAL DAILY
Status: DISCONTINUED | OUTPATIENT
Start: 2018-10-29 | End: 2018-10-29 | Stop reason: HOSPADM

## 2018-10-29 RX ORDER — PANTOPRAZOLE SODIUM 40 MG/1
40 TABLET, DELAYED RELEASE ORAL 2 TIMES DAILY
Qty: 60 TABLET | Refills: 0 | Status: SHIPPED | OUTPATIENT
Start: 2018-10-29 | End: 2019-09-12 | Stop reason: ALTCHOICE

## 2018-10-29 RX ORDER — RIBOFLAVIN (VITAMIN B2) 100 MG
100 TABLET ORAL DAILY
Status: DISCONTINUED | OUTPATIENT
Start: 2018-10-29 | End: 2018-10-29 | Stop reason: HOSPADM

## 2018-10-29 RX ORDER — CHOLECALCIFEROL (VITAMIN D3) 10 MCG
1 TABLET ORAL DAILY
Status: DISCONTINUED | OUTPATIENT
Start: 2018-10-29 | End: 2018-10-29 | Stop reason: HOSPADM

## 2018-10-29 RX ADMIN — INSULIN LISPRO 1 UNITS: 100 INJECTION, SOLUTION INTRAVENOUS; SUBCUTANEOUS at 06:42

## 2018-10-29 RX ADMIN — PANTOPRAZOLE SODIUM 40 MG: 40 INJECTION, POWDER, FOR SOLUTION INTRAVENOUS at 06:43

## 2018-10-29 RX ADMIN — PANTOPRAZOLE SODIUM 40 MG: 40 INJECTION, POWDER, FOR SOLUTION INTRAVENOUS at 00:50

## 2018-10-29 RX ADMIN — SODIUM CHLORIDE 75 ML/HR: 0.9 INJECTION, SOLUTION INTRAVENOUS at 10:43

## 2018-10-29 RX ADMIN — GADOBUTROL 8 ML: 604.72 INJECTION INTRAVENOUS at 09:00

## 2018-10-29 NOTE — PROGRESS NOTES
0- Upon starting MRI kime patient was "floored" her words that I even mentioned MRI and her pancreas  She said "so I have pancreatic cancer" I halted the conversation and said I would call the Dr, no one said that but MRI was ordered  Dr Og Medrano was called this afternoon and Dr said patient was aware of MRI  Patient said "I thought I was getting medication and going home" Spoke to Dr Og Medrano and transferred phone call into patient's room who spoke to Dr  Patient said she was upset  Dr assured her plan of treatment and course would be dependent on what was found if anything  Dr Og Medrano said duct is inflamed and does not necessarily mean cancer but should be looked at while inpatient per patient Did not show anything on Catscan

## 2018-10-29 NOTE — DISCHARGE SUMMARY
Discharge- Froylan Powell 1955, 59 y o  female MRN: [id number]    Unit/Bed#: -02 Encounter: [id number]    Primary Care Provider: JUDY Pittman Ma   Date and time admitted to hospital: 10/27/2018 10:11 AM        Dilation of pancreatic duct   Assessment & Plan    -MRCP: Profound hepatic steatosis  Minimal fusiform enlargement of the pancreatic duct in the pancreatic body is a nonspecific finding  Azeem Gómez is no MR evidence of obstructing pancreatic head mass   Pancreatic ductal prominence could be related to prior pancreatitis   However, there are tiny 4 to 5 mm cysts in the uncinate process of the pancreas seen on MRCP sequences which appear to represent small side branch intraductal papillary mucinous neoplasm  Azeem Gómez is a history of prior pancreatitis which could explain the prominence of the duct in the pancreatic body, short-term interval follow-up MRI with MRCP would with next examination to be performed in 3 months to evaluate for stability of   pancreatic duct and sidebranches lesions would be recommended   If there is no history of pancreatitis to account for possible enlargement of the pancreatic duct in the pancreatic body, consider ERCP to provide more sensitive evaluation of possible main   pancreatic duct intraductal papillary mucinous neoplasm   -As per GI, outpt EUS/ERCP     Class 1 obesity due to excess calories without serious comorbidity with body mass index (BMI) of 30 0 to 30 9 in adult   Assessment & Plan    -encourage wt loss     Type 2 diabetes mellitus with hyperglycemia, without long-term current use of insulin [facility])   Assessment & Plan    No results found for: HGBA1C    Recent Labs      10/28/18   1821  10/28/18   2232  10/29/18   0601  10/29/18   1112   POCGLU  165*  118  153*  117       Blood Sugar Average: Last 72 hrs:  (P) 139     -as per pt diet controlled at home and she checks her BGs multiple times daily       * Acute GI bleeding   Assessment & Plan    -due to PUD  -was initially on protonix gtt prior to EGD  -EGD showed:  #1  Esophagus- normal  #2  Stomach- multiple large antral cratered NONBLEEDING ulcers  No stigmata on the ulcers  #3  Duodenum- 2 small cratered duodenal bulb ulcers  Nonbleeding without stigmata of recent bleed  Bile in the second portion of the duodenum  -s/p pRBCs, Hb stable  -case d/w'd Dr Henry Enamorado, medically stable for d/c on PPI  -Note CT A/P:  1   No acute abdominopelvic findings  2   Focal dilation of the main pancreatic duct to 6 mm at the head and neck of the pancreas raises concern for main duct  IPMN   No pancreatic head mass or gland atrophy   The finding warrants further evaluation with nonurgent follow-up pancreatic   MRI/MRCP  3  Cristino Liliana appears relatively hypoenhancing, which could suggest steatosis   However, specificity is decreased on postcontrast studies   The above recommended MRI could confirm or refute the finding  Discharging Physician / Practitioner: Moo Molina MD  PCP: [facility] [address] [place] [address]  Admission Date:   Admission Orders     Ordered        10/27/18 1155  Inpatient Admission (expected length of stay for this patient is greater than two midnights)  Once             Discharge Date: 10/29/18    Resolved Problems  Date Reviewed: 10/29/2018    None          Consultations During Hospital Stay:  · Gastroenterology    Procedures Performed:     · EGD as above    Significant Findings / Test Results:     · See above    Incidental Findings:   · See above     Test Results Pending at Discharge (will require follow up): · None     Outpatient Tests Requested:  · EUS/ERCP  · CBC in 1 week, script from PCP    Complications:  None    Reason for Admission:  Melena    Hospital Course:     Lisa Londono is a 59 y o  female patient who originally presented to the hospital on 10/27/2018 due to melena with acute blood loss anemia (POA) due to upper GI bleeding as outlined in the H&P done on admission      Please see above list of diagnoses and related plan for additional information  Condition at Discharge: good     Discharge Day Visit / Exam:     Subjective:  Patient reports mild lower abdominal cramping  She denies blood in her stool and says that the degree of tarriness of her stool is improving  Vitals: Blood Pressure: 116/57 (10/29/18 1140)  Pulse: 81 (10/29/18 1140)  Temperature: 98 1 °F (36 7 °C) (10/29/18 1140)  Temp Source: Oral (10/29/18 1140)  Respirations: 17 (10/29/18 1140)  Height: 5' 4" (162 6 cm) (10/27/18 1515)  Weight - Scale: 83 7 kg (184 lb 8 4 oz) (10/29/18 0615)  SpO2: 95 % (10/29/18 1140)  Exam:   Physical Exam  Gen: NAD, AAOx3, well developed, well nourished  Eyes: EOMI, PERRLA, no scleral icterus  ENMT:  Oropharynx clear of erythema or exudates, no nasal discharge, no otic discharge, moist mucous membranes  Neck:  Supple  Lymph:  No anterior or posterior cervical or supraclavicular lymphadenopathy  Cardiovascular:  Regular rate and rhythm, normal S1-S2, no murmurs, rubs, or gallops  Lungs:  Clear to auscultation bilaterally, no wheezes, or rales, or rhonchi  Abdomen:  Positive bowel sounds, soft, nontender, nondistended, no palpable organomegaly   Skin:  Intact, no obvious lesions or rashes, no edema  Neuro: Cranial nerves 2-12 are intact, non-focal, 5/5 strength in all 4 extremities      Discharge instructions/Information to patient and family:   See after visit summary for information provided to patient and family  Provisions for Follow-Up Care:  See after visit summary for information related to follow-up care and any pertinent home health orders  Disposition:     Home    For Discharges to [facility] SNF:   · Not Applicable to this Patient - Not Applicable to this Patient    Planned Readmission: None     Discharge Statement:  I spent 30 minutes discharging the patient  This time was spent on the day of discharge  I had direct contact with the patient on the day of discharge   Greater than 50% of the total time was spent examining patient, answering all patient questions, arranging and discussing plan of care with patient as well as directly providing post-discharge instructions  Additional time then spent on discharge activities  Discharge Medications:  See after visit summary for reconciled discharge medications provided to patient and family        ** Please Note: This note has been constructed using a voice recognition system **

## 2018-10-29 NOTE — ASSESSMENT & PLAN NOTE
-due to PUD  -was initially on protonix gtt prior to EGD  -EGD showed:  #1  Esophagus- normal  #2  Stomach- multiple large antral cratered NONBLEEDING ulcers  No stigmata on the ulcers  #3  Duodenum- 2 small cratered duodenal bulb ulcers  Nonbleeding without stigmata of recent bleed  Bile in the second portion of the duodenum  -s/p pRBCs, Hb stable  -case d/w'd Dr Kareem Michele, medically stable for d/c on PPI  -Note CT A/P:  1   No acute abdominopelvic findings  2   Focal dilation of the main pancreatic duct to 6 mm at the head and neck of the pancreas raises concern for main duct  IPMN   No pancreatic head mass or gland atrophy   The finding warrants further evaluation with nonurgent follow-up pancreatic   MRI/MRCP  3  Annie [place] appears relatively hypoenhancing, which could suggest steatosis   However, specificity is decreased on postcontrast studies   The above recommended MRI could confirm or refute the finding

## 2018-10-29 NOTE — ASSESSMENT & PLAN NOTE
-MRCP: Profound hepatic steatosis  Minimal fusiform enlargement of the pancreatic duct in the pancreatic body is a nonspecific finding  Rogers Almeida is no MR evidence of obstructing pancreatic head mass   Pancreatic ductal prominence could be related to prior pancreatitis   However, there are tiny 4 to 5 mm cysts in the uncinate process of the pancreas seen on MRCP sequences which appear to represent small side branch intraductal papillary mucinous neoplasm  Rogers Almeida is a history of prior pancreatitis which could explain the prominence of the duct in the pancreatic body, short-term interval follow-up MRI with MRCP would with next examination to be performed in 3 months to evaluate for stability of   pancreatic duct and sidebranches lesions would be recommended   If there is no history of pancreatitis to account for possible enlargement of the pancreatic duct in the pancreatic body, consider ERCP to provide more sensitive evaluation of possible main   pancreatic duct intraductal papillary mucinous neoplasm   -As per GI, outpt EUS/ERCP

## 2018-10-29 NOTE — SOCIAL WORK
Met with patient to discuss the role of Care Management  Patient resides alone in a 2 story home using only the first floor  PTA she was independent of ADL's  She in in between job and has no medical insurance, referral to [place] for PATHS referral made  She reports no assistive device  Her  recently , provided support  She plans to return home and anticipates no discharge needs

## 2018-10-29 NOTE — PROGRESS NOTES
Amaury Batch  [id number]    59 y o   female      ASSESSMENT  1  Anemia -  normocytic  Hemoglobin 8 3 g today  An upper endoscopy performed 10/28 showed multiple large antral cratered nonbleeding ulcers  Two small nonbleeding cratered duodenal bulb ulcers  CT showing dilated PD - so will also need to rule out duodenal ulcerations from ? Pancreatic source  She has never had a colonoscopy so if EGD unremarkable, will need to proceed to rule out colon diverticular disease, angioectasias/malignancies       2  Lower abd cramping and leukocytosis in setting of possible constipation - CT neg for acute abd etiologies but showing dil PD      3  Dilated panc duct - proceed to MRCP to further characterize    PLAN  1  Awaiting MRCP  2  Continue Protonix  3  Follow H&H  4  Repeat EGD in 2-3 months to assess healing of gastric and duodenal ulcers  5  Check H pylori stool antigen  6  Eventual colonoscopy    Chief Complaint   Patient presents with    Fatigue     patient presents to the ED with c/o fatigue, chills, and SOB for the past two days  Patient states she is under an unusual amount of stress  [address] [facility]   Patient with some lower abdominal crampy pain    Denies any upper abdominal pain, nausea, vomiting or any other GI complaints    /56   Pulse 80   Temp 97 7 °F (36 5 °C) (Oral)   Resp 20   Ht 5' 4" (1 626 m)   Wt 83 7 kg (184 lb 8 4 oz)   SpO2 96%   BMI 31 67 kg/m²       PHYSICALEXAM  Constitutional:  Well developed, well nourished, no acute distress, non-toxic appearance   Eyes:   conjunctiva normal   HENT:  Atraumatic  Respiratory:  No respiratory distress  Cardiovascular:  Normal rate  GI:  Soft, nondistended, normal bowel sounds, nontender, some mild lower abdominal tenderness  Musculoskeletal:  No edema   Neurologic:  Alert & oriented x 3   Psychiatric:  Speech and behavior appropriate       Lab Results   Component Value Date    CALCIUM 8 2 (L) 10/29/2018     10/29/2018    K 3 8 10/29/2018    CO2 26 10/29/2018     (H) 10/29/2018    BUN 11 10/29/2018    CREATININE 0 57 (L) 10/29/2018     Lab Results   Component Value Date    WBC 6 78 10/29/2018    HGB 8 3 (L) 10/29/2018    HCT 25 4 (L) 10/29/2018    MCV 95 10/29/2018     10/29/2018     Lab Results   Component Value Date    ALT 21 10/28/2018    AST 10 10/28/2018    ALKPHOS 50 10/28/2018     No results found for: AMYLASE  No results found for: LIPASE  Lab Results   Component Value Date    IRON 90 10/28/2018    TIBC 272 10/28/2018    FERRITIN 53 10/28/2018     Lab Results   Component Value Date    INR 1 15 10/27/2018       Counseling / Coordination of Care  Total floor / unit time spent today 25 minutes  Greater than 50% of total time was spent with the patient and / or family counseling and / or coordination of care  A description of the counseling / coordination of care: Ervin Knight

## 2018-10-29 NOTE — PROGRESS NOTES
Discharge instructions taught to pt  Pt verbalized understanding  All questions answered  IV removed  Pt denies specific needs at this time  Pt waiting for family for ride home

## 2018-10-29 NOTE — ASSESSMENT & PLAN NOTE
No results found for: HGBA1C    Recent Labs      10/28/18   1821  10/28/18   2232  10/29/18   0601  10/29/18   1112   POCGLU  165*  118  153*  117       Blood Sugar Average: Last 72 hrs:  (P) 139     -as per pt diet controlled at home and she checks her BGs multiple times daily

## 2018-10-30 ENCOUNTER — TRANSITIONAL CARE MANAGEMENT (OUTPATIENT)
Dept: FAMILY MEDICINE CLINIC | Facility: HOSPITAL | Age: 63
End: 2018-10-30

## 2018-10-30 NOTE — PLAN OF CARE

## 2018-10-31 ENCOUNTER — TELEPHONE (OUTPATIENT)
Dept: FAMILY MEDICINE CLINIC | Facility: HOSPITAL | Age: 63
End: 2018-10-31

## 2018-11-05 ENCOUNTER — OFFICE VISIT (OUTPATIENT)
Dept: FAMILY MEDICINE CLINIC | Facility: HOSPITAL | Age: 63
End: 2018-11-05
Payer: COMMERCIAL

## 2018-11-05 VITALS
DIASTOLIC BLOOD PRESSURE: 78 MMHG | WEIGHT: 182 LBS | SYSTOLIC BLOOD PRESSURE: 156 MMHG | HEIGHT: 63 IN | RESPIRATION RATE: 16 BRPM | HEART RATE: 79 BPM | BODY MASS INDEX: 32.25 KG/M2

## 2018-11-05 DIAGNOSIS — R60.9 EDEMA, UNSPECIFIED TYPE: Primary | ICD-10-CM

## 2018-11-05 DIAGNOSIS — K26.9 DUODENAL ULCER: ICD-10-CM

## 2018-11-05 DIAGNOSIS — M54.50 LUMBAR PAIN: ICD-10-CM

## 2018-11-05 DIAGNOSIS — E11.65 TYPE 2 DIABETES MELLITUS WITH HYPERGLYCEMIA, WITHOUT LONG-TERM CURRENT USE OF INSULIN (HCC): ICD-10-CM

## 2018-11-05 DIAGNOSIS — K25.3 ACUTE GASTRIC ULCER WITHOUT HEMORRHAGE OR PERFORATION: ICD-10-CM

## 2018-11-05 DIAGNOSIS — K86.89 DILATION OF PANCREATIC DUCT: ICD-10-CM

## 2018-11-05 PROCEDURE — 99213 OFFICE O/P EST LOW 20 MIN: CPT | Performed by: INTERNAL MEDICINE

## 2018-11-05 RX ORDER — FUROSEMIDE 20 MG/1
20 TABLET ORAL DAILY
Qty: 30 TABLET | Refills: 3 | Status: SHIPPED | OUTPATIENT
Start: 2018-11-05 | End: 2019-09-12 | Stop reason: ALTCHOICE

## 2018-11-05 NOTE — PROGRESS NOTES
Assessment/Plan:             Problem List Items Addressed This Visit        Digestive    Acute gastric ulcer without hemorrhage or perforation    Duodenal ulcer    Dilation of pancreatic duct       Endocrine    Type 2 diabetes mellitus with hyperglycemia, without long-term current use of insulin ([facility] [address] )      Other Visit Diagnoses     Edema, unspecified type    -  Primary    Lumbar pain                Subjective:      Patient ID: Jareth Boyce is a 59 y o  female    Known to us from recent hospital stay with gi bleed and found to have gastric and duodenal ulcer-given 2 units prbc initially  Also had dialted pancreatic cust and will need repeat study in 3 months  To see Gi on 11/15 and will recheck ct in 3 months - await their timing of repeat scope  Has been contacted by Dr Steven Nieto office for falguni but wishes to switch to our practice  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  2 months ago and now has lost insurance after caring for him-has applied for medical assistance with [employer] program    She is now trying to sell home on 3 acres as she is on limited income  Leg edema started on Saturday- had been eating extra cheese - bp is up now- was on iv fluid        The following portions of the patient's history were reviewed and updated as appropriate: allergies, current medications and problem list      Review of Systems   Cardiovascular: Positive for leg swelling  Musculoskeletal: Positive for back pain  All other systems reviewed and are negative          Objective:      Current Outpatient Prescriptions:     acetaminophen (TYLENOL) 325 mg tablet, Take 650 mg by mouth every 6 (six) hours as needed for mild pain, Disp: , Rfl:     Ascorbic Acid (VITAMIN C) 100 MG tablet, Take 100 mg by mouth daily, Disp: , Rfl:     b complex vitamins capsule, Take 1 capsule by mouth daily, Disp: , Rfl:     cholecalciferol (VITAMIN D3) 400 units tablet, Take 400 Units by mouth daily, Disp: , Rfl:     pantoprazole (PROTONIX) 40 mg tablet, Take 1 tablet (40 mg total) by mouth 2 (two) times a day for 30 days, Disp: 60 tablet, Rfl: 0    vitamin A 7500 UNIT capsule, Take 7,500 Units by mouth daily, Disp: , Rfl:     Blood pressure 156/78, pulse 79, resp  rate 16, height 5' 2 5" (1 588 m), weight 82 6 kg (182 lb)  Physical Exam   Constitutional: She is oriented to person, place, and time  She appears well-developed and well-nourished  No distress  HENT:   Right Ear: External ear normal    Left Ear: External ear normal    Mouth/Throat: Oropharynx is clear and moist    Eyes: Conjunctivae are normal  Right eye exhibits no discharge  Left eye exhibits no discharge  No scleral icterus  Neck: Neck supple  No thyromegaly present  Cardiovascular: Normal rate and regular rhythm  No murmur heard  Pulmonary/Chest: Breath sounds normal  No respiratory distress  She has no wheezes  She has no rales  Abdominal: Soft  She exhibits no distension  There is no tenderness  Musculoskeletal: She exhibits edema  She exhibits no tenderness  Plus one bilateral edema   Lymphadenopathy:     She has no cervical adenopathy  Neurological: She is alert and oriented to person, place, and time  No cranial nerve deficit  She exhibits normal muscle tone  Skin: Skin is warm and dry  No rash noted  Psychiatric: She has a normal mood and affect  Her behavior is normal  Judgment and thought content normal    Nursing note and vitals reviewed

## 2018-11-19 ENCOUNTER — APPOINTMENT (OUTPATIENT)
Dept: LAB | Facility: HOSPITAL | Age: 63
End: 2018-11-19
Attending: INTERNAL MEDICINE
Payer: COMMERCIAL

## 2018-11-19 DIAGNOSIS — K25.3 ACUTE GASTRIC ULCER WITHOUT HEMORRHAGE OR PERFORATION: ICD-10-CM

## 2018-11-19 DIAGNOSIS — R60.9 EDEMA, UNSPECIFIED TYPE: ICD-10-CM

## 2018-11-19 LAB
ALBUMIN SERPL BCP-MCNC: 3.5 G/DL (ref 3.5–5)
ALP SERPL-CCNC: 79 U/L (ref 46–116)
ALT SERPL W P-5'-P-CCNC: 30 U/L (ref 12–78)
ANION GAP SERPL CALCULATED.3IONS-SCNC: 10 MMOL/L (ref 4–13)
AST SERPL W P-5'-P-CCNC: 14 U/L (ref 5–45)
BASOPHILS # BLD AUTO: 0.03 THOUSANDS/ΜL (ref 0–0.1)
BASOPHILS NFR BLD AUTO: 1 % (ref 0–1)
BILIRUB SERPL-MCNC: 0.4 MG/DL (ref 0.2–1)
BUN SERPL-MCNC: 14 MG/DL (ref 5–25)
CALCIUM SERPL-MCNC: 8.9 MG/DL (ref 8.3–10.1)
CHLORIDE SERPL-SCNC: 104 MMOL/L (ref 100–108)
CO2 SERPL-SCNC: 26 MMOL/L (ref 21–32)
CREAT SERPL-MCNC: 0.6 MG/DL (ref 0.6–1.3)
EOSINOPHIL # BLD AUTO: 0.15 THOUSAND/ΜL (ref 0–0.61)
EOSINOPHIL NFR BLD AUTO: 2 % (ref 0–6)
ERYTHROCYTE [DISTWIDTH] IN BLOOD BY AUTOMATED COUNT: 14.7 % (ref 11.6–15.1)
GFR SERPL CREATININE-BSD FRML MDRD: 98 ML/MIN/1.73SQ M
GLUCOSE P FAST SERPL-MCNC: 118 MG/DL (ref 65–99)
HCT VFR BLD AUTO: 34.4 % (ref 34.8–46.1)
HGB BLD-MCNC: 11 G/DL (ref 11.5–15.4)
IMM GRANULOCYTES # BLD AUTO: 0.01 THOUSAND/UL (ref 0–0.2)
IMM GRANULOCYTES NFR BLD AUTO: 0 % (ref 0–2)
LYMPHOCYTES # BLD AUTO: 1.98 THOUSANDS/ΜL (ref 0.6–4.47)
LYMPHOCYTES NFR BLD AUTO: 32 % (ref 14–44)
MCH RBC QN AUTO: 28.2 PG (ref 26.8–34.3)
MCHC RBC AUTO-ENTMCNC: 32 G/DL (ref 31.4–37.4)
MCV RBC AUTO: 88 FL (ref 82–98)
MONOCYTES # BLD AUTO: 0.42 THOUSAND/ΜL (ref 0.17–1.22)
MONOCYTES NFR BLD AUTO: 7 % (ref 4–12)
NEUTROPHILS # BLD AUTO: 3.64 THOUSANDS/ΜL (ref 1.85–7.62)
NEUTS SEG NFR BLD AUTO: 58 % (ref 43–75)
PLATELET # BLD AUTO: 342 THOUSANDS/UL (ref 149–390)
PMV BLD AUTO: 9 FL (ref 8.9–12.7)
POTASSIUM SERPL-SCNC: 3.8 MMOL/L (ref 3.5–5.3)
PROT SERPL-MCNC: 6.6 G/DL (ref 6.4–8.2)
RBC # BLD AUTO: 3.9 MILLION/UL (ref 3.81–5.12)
SODIUM SERPL-SCNC: 140 MMOL/L (ref 136–145)
WBC # BLD AUTO: 6.23 THOUSAND/UL (ref 4.31–10.16)

## 2018-11-19 PROCEDURE — 85025 COMPLETE CBC W/AUTO DIFF WBC: CPT

## 2018-11-19 PROCEDURE — 36415 COLL VENOUS BLD VENIPUNCTURE: CPT

## 2018-11-19 PROCEDURE — 80053 COMPREHEN METABOLIC PANEL: CPT

## 2018-11-21 ENCOUNTER — TELEPHONE (OUTPATIENT)
Dept: FAMILY MEDICINE CLINIC | Facility: HOSPITAL | Age: 63
End: 2018-11-21

## 2018-11-21 DIAGNOSIS — R73.09 ELEVATED GLUCOSE: Primary | ICD-10-CM

## 2019-03-13 NOTE — UTILIZATION REVIEW
URGENT/EMERGENT  INPATIENT/SPU AUTHORIZATION REQUEST    Date: 03/13/19            # Pages in this Request:     X New Request   Additional Information for PA#:     Office Contact Name:  Nany Mcrae Title: Utilization Review, Reghailey Nurse     Phone: [phone]  Ext  Availability (Date/Time): Wednesday - Friday 8 am- 4 pm    X Inpatient Review  SPU Review        Current       X Late Pick-up   · How your facility was first notified of the Late Pick-up: PATHS  · When your facility was first notified of the Late Pick-up (date): 3/12         RECIPIENT INFORMATION    Recipient MT#:[phone]   Recipient Name: Alena Thompson    YOB: 1955  62 y o  Recipient Alias:     Gender:   Male X Female Medicaid Eligibility ([address] [place] [address]): INSURANCE INFORMATION    (All other private or governmental health insurance benefits must be utilized prior to billing the [place] Program)    Was this admission the result of an MVA, other accident, assault, injury, fall, gunshot, bite etc ? Yes X No                   If yes, provide a brief description of the incident  Does the recipient have other insurance coverage? Yes X No        Insurance Company Name/Policy #      Did that insurance pay on this claim? Yes  No        Did that insurance deny this claim? Yes  No    If yes, reason for denial:      Does the recipient have Medicare? Yes X No        Did Medicare exhaust prior to this admission? Yes  No            Did Medicare partially pay this claim? Yes  No        Did that insurance deny this claim? Yes  No    If yes, reason for denial:          Was the recipient a prisoner at the time of admission?    Yes X No            PROVIDER INFORMATION    Hospital Name: [facility]   [employer] Provider ID#: [id number]    [address] Physician [address] Schwab Provider ID#: [id number]        ADMISSION INFORMATION    Type of Admission: (please choose one)    X ED      Direct    If yes, from where?     Transfer    If yes, transferring hospital (inpatient, rehab, or psych) Provider Name/Provider ID#: Admission Floor or Unit Type: ICU    Dates/Times:        ED Date/Time: 10/27/2018 10:00        Observation Date/Time:        Admission Date/Time: 10/27/18 1155        Discharge or Transfer Date/Time: 10/29/2018  1423        DIAGNOSIS/PROCEDURE CODES    Primary Diagnosis Code/Primary Diagnosis Code description: Melena [K92 1]; Acute blood loss anemia [D62]; Lower abdominal pain [R10 30] (MAY RE-ORDER DX CODES)            Additional Diagnosis Code(s) and Description(s)-(up to three additional codes):     Procedure Code (one) and description: [id number] TRANSFUSE PRBC        CLINICAL INFORMATION - PRIOR ADMISSION ONLY    Is there a prior admission with a discharge date within 30 days of the date of this admission? X No (Proceed to the next section - "Clinical Information - General Review Checklist:)      Yes (Provide the following information)     Prior admission dates:    MA Prior Authorization Number:        Review Outcome:     Diagnosis Code(s)/Description:    Procedure Code/Description:    Findings:    Treatment:    Condition on Discharge:   Vitals:    Labs:   Imaging:   Medications: Follow-up Instructions:    Disposition:        CLINICAL INFORMATION - GENERAL REVIEW CHECKLIST    EMERGENCY DEPARTMENT: (Proceed to "ADMISSION" if Direct Admission)    Presenting Signs/Symptoms:    Fatigue       patient presents to the ED with c/o fatigue, chills, and SOB for the past two days  Patient states she is under an unusual amount of stress           History of Illness: Simón Bruner an 59 y  o  female      HPI:  Who presents to the ER with worsening lower abdominal cramping off and on for the last year which she blamed on  bowel issues  Sania Bolton has never had a colonoscopy but had seen GI in [place] remotely in the past   She reports increased lower abdominal cramps  and melena for the past 24 hours-she had dark stools with some minimal bright red blood this morning prompting her to come to the ER  Clint Kim relates that she had resumed aspirin approximately a week ago for arthritis issues and also uses Tylenol on a regular basis   She complained of some lightheadedness initially on presentation   Is mildly tachycardic at 114 and blood pressure is stable 126/59          Medication/treatment prior to arrival in the ED:     Past Medical History: Active Ambulatory Problems     Diagnosis Date Noted    No Active Ambulatory Problems     Resolved Ambulatory Problems     Diagnosis Date Noted    No Resolved Ambulatory Problems     Past Medical History:   Diagnosis Date    Diabetes mellitus ([facility] [address] )     Hypertension     Irritable bowel disease     Vertigo        Clinical Exam:     Initial Vital Signs: (Temp, Pulse, Resp, and BP)   ED Triage Vitals [10/27/18 1015]   Temperature Pulse Respirations Blood Pressure SpO2   98 8 °F (37 1 °C) (!) 112 20 128/54 98 %      Temp Source Heart Rate Source Patient Position - Orthostatic VS BP Location FiO2 (%)   Temporal Monitor Sitting Left arm --      Pain Score       4           Pertinent Repeat Vital Signs: (include times they were obtained)    Pertinent Sustained Findings: (include times they were obtained)    Weight in Kilograms:  10/27/18 83 3 kg (183 lb 10 3 oz)       Pertinent Labs (results):  BUN  39  Albumin  3 2  WBC   12 01  H/H  8 4/25 3   (  10/27)               8  8/26 3  ( 10/28)  Abs  neutro    7 94        Radiology (results):  Ct  Abd/pelvis:     No acute abdominopelvic findings  2   Focal dilation of the main pancreatic duct to 6 mm at the head and neck of the pancreas raises concern for main duct  IPMN   No pancreatic head mass or gland atrophy   The finding warrants further evaluation with nonurgent follow-up pancreatic   MRI/MRCP  3   Liver appears relatively hypoenhancing, which could suggest steatosis   However, specificity is decreased on postcontrast studies   The above recommended MRI could confirm or refute the finding  EKG (results): Other tests (results):    Tests pending final results:    Treatment in the ED:   Medication Administration from 10/27/2018 0956 to 10/27/2018 1504       Date/Time Order Dose Route Action Action by Comments     10/27/2018 1336 sodium chloride 0 9 % bolus 1,000 mL 0 mL Intravenous Stopped Nye Rehan Bennett, RN      10/27/2018 1039 sodium chloride 0 9 % bolus 1,000 mL 1,000 mL Intravenous New Bag Wil Montesinos, RN      10/27/2018 1039 pantoprazole (PROTONIX) injection 40 mg 40 mg Intravenous Given Wilkhari Montesinos, ETHEL      10/27/2018 1042 pantoprazole (PROTONIX) 80 mg in sodium chloride 0 9 % 100 mL infusion 8 mg/hr Intravenous New Bag Wil Montesinso, RN      10/27/2018 1443 iohexol (OMNIPAQUE) 350 MG/ML injection (SINGLE-DOSE) 100 mL 100 mL Intravenous Given Almaz Stokes      10/27/2018 1444 iohexol (OMNIPAQUE) 240 MG/ML solution 50 mL 50 mL Oral Given Jeppie Chafe            Meds: Name, dose, route, time, number of doses given        Nebulizer treatments: Type, total number given      IVs: Type, rate, total amt  given          Other treatments:       Change in condition while in the ED:   Response to ED Treatment:           OBSERVATION: (Proceed to "ADMISSION" if Direct Admission)    Orders written during the observation period  Meds Name, dose, route, time, how may doses given:  PRN Meds Name, dose, route, time, how many doses given within the first 24 hrs :  IVs Type, rate, and total amt  ordered/given:  Labs, imaging, other:  Consults and findings:    Test Results during the observation period  Pertinent Lab tests (dates/results):  Culture results (blood, urine, spinal, wound, respiratory, etc ):  Imaging tests (dates/results):  EKG (dates/results):   Other test (dates/results):  Tests pending (dates/results):    Surgical or Invasive Procedures during the observation period  Name of surgery/procedure:  Date & Time:  Patient Response:  Post-operative orders:  Operative Report/Findings:    Response to Treatment, Major Change in Condition, Major Charge in Treatment during the observation period          ADMISSION:    DIRECT Admissions Only:    · Presenting Signs/Symptoms:   ·   · Medication/treatment prior to arrival:  ·   · Past Medical History:  ·   · Clinical Exam on admission:  ·   · Vital Signs on admission: (Temp, Pulse, Resp, and BP)  ·   · Weight in kilograms:     ALL Admissions:    Admission Orders and Other Orders written within the first 24 hrs after admission  NPO  Tele  Cons  GI  hmgb   Q 8 hrs  Serial troponin  MRI  Abdomen          Meds Name, dose, route, time, how may doses given:  acetaminophen 650 mg Oral Q6H PRN Sonya Fly, DO     insulin lispro 1-5 Units Subcutaneous Q6H [facility] Sonya Fly, DO     lisinopril 5 mg Oral Daily Sonya Fly, DO     ondansetron 4 mg Intravenous Q6H PRN Sonya Fly, DO           PRN Meds Name, dose, route, time, how many doses given within the first 24 hrs :     acetaminophen    ondansetron            IVs Type, rate, and total amt  ordered/given:  pantoprozole (PROTONIX) infusion (Continuous) 8 mg/hr Intravenous Continuous Elli Lied, DO Last Rate: 8 mg/hr (10/28/18 3010)   sodium chloride 125 mL/hr Intravenous Continuous Sonya Fly, DO Last Rate: 125 mL/hr (10/27/18 1691)         Labs, imaging, other:      Consults and findings:  Assessment/Plan:  Acute GI bleed-consult to gi and will keep NPO for now-differential includes upper GI source with her longstanding history of GERD and resuming aspirin recently  Trino Echevarria having lower abdominal pain and cramping Will and diverticular source may be a cause-will order CT scan of the abdomen pelvis  2   Hyperglycemia-greater than 400 previously been listed as prediabetic but she truly has diabetes mellitus type 2 with these high readings-place on coverage for now but may need meds instituted in the near future  3   Hypertension-watch serial reading     Per  GI  Consult:  Anemia - unclear baseline as no previous lab work seen and pt has not really had good medical follow up for several years  However, given the "dark stools" in setting of lower abd cramping and recent daily ASA 325mg, will need to rule out PUD  She has never had a colonoscopy so if EGD unremarkable, will need to proceed to rule out colon diverticular disease, angioectasias/malignancies  Hemodynamically stable with no further bleeding and rectal vault with some dark brown stool  FOBT +      2  Lower abd cramping and leukocytosis in setting of possible constipation - pt's history is also however concerning for diverticulitis given the 2 days of cramping and elev WBC in ER  So before any endoscopic evaluation, will need to rule out diverticulitis      PLAN:  Hold ASA 325mg  Check H&H q8hrs and transfuse to hgb >8  Check CT today to rule out diverticulitis and other abd etiologies for the pain/leukocytosis  PPI IV BID  NPO for now  Characterize the anemia with iron panel since we do not know her baseline  Pending above, will proceed with EGD Monday to give time for the aspirin 325 to wear off, or more urgently if needed       PROGRESS  NOTE  10/28  · Acute GI bleed with anemia-patient for EGD as per GI note and eventual colonoscopy-lower abdominal pain has decreased somewhat today-H&H 8 8/26 3  · Dilated pancreatic duct on CT -will need MRCP ordered  · Diabetes mellitus type 2 fasting glucose 172-on dietary control as outpatient-consider starting metformin when taking p o  · Hypertension-elevated readings today will start on lisinopril 5 mg daily        Test Results after admission  Pertinent Lab tests (dates/results):  Culture results (blood, urine, spinal, wound, respiratory, etc ):  Imaging tests (dates/results):  EKG (dates/results):   Other test (dates/results):  Tests pending (dates/results):    Surgical or Invasive Procedures  10/28:  ESOPHAGOGASTRODUODENOSCOPY     PROCEDURE: EGD     SEDATION: Monitored anesthesia care, check anesthesia records     ASA Class: 2     INDICATIONS: melena, acute blood loss anemia     CONSENT:  Informed consent was obtained for the procedure, including sedation after explaining the risks and benefits of the procedure  Risks including but not limited to bleeding, perforation, infection, and missed lesion      PREPARATION:   Telemetry, pulse oximetry, blood pressure were monitored throughout the procedure  Patient was identified by myself both verbally and by visual inspection of ID band      DESCRIPTION:   Patient was placed in the left lateral decubitus position and was sedated with the above medication  The gastroscope was introduced in to the oropharynx and the esophagus was intubated under direct visualization  Scope was passed down the esophagus up to 2nd part of the duodenum  A careful inspection was made as the gastroscope was withdrawn, including a retroflexed view of the stomach; findings and interventions are described below       FINDINGS:     #1  Esophagus- normal     #2  Stomach- multiple large antral cratered NONBLEEDING ulcers  No stigmata on the ulcers       #3  Duodenum- 2 small cratered duodenal bulb ulcers  Nonbleeding without stigmata of recent bleed  Bile in the second portion of the duodenum  IMPRESSIONS:       Multiple cratered ulcers in the gastric antrum and the duodenal bulb  No stigmata requiring intervention  No blood noted      RECOMMENDATIONS:      Avoid NSAIDS  PPI BID  Adv to clear liquids today  Continue to monitor H&H q8hrs and transfuse to hgb >8  Resume home meds  Repeat EGD in 2-3 months to follow up on the gastric ulcers  Check H pylori stool antigen         COMPLICATIONS:  None; patient tolerated the procedure well            DISPOSITION: PACU           CONDITION: Stable        Name of surgery/procedure:  Date & Time:  Patient Response:  Post-operative orders:  Operative Report/Findings:    Response to Treatment, Major Change in Condition, Major Charge in Treatment anytime during admission    Disposition on Discharge  Home, Rehab, SNF, LTC, Shelter, etc : Home/Self Care    Cease to Breathe (CTB)  If a patient expires during an admission, in addition to the above information, please include:    Summary/timeline of the patient's decline in condition:    Medications and treatment:    Patient response to treatment:    Date and time patient ceased to breathe:        Is there a Readmission that follows this admission? Yes X No    If yes, reason for denial:          InterQual Review    InterQual Criteria Met: X Yes  No  N/A        Please include the InterQual Review, InterQual year/version used, and the criteria selected: Patient: Tres Hoff  Review Number: [id number]  Review Status: In Primary  Criteria Status: Acute Met  Day Met: Episode Day 1     Condition Specific: Yes        OUTCOMES  Outcome Type: Primary           REVIEW DETAILS     Product: Rafy Mow Adult  Subset: Anemia/Bleeding      (Symptom or finding within 24h)         (Excludes PO medications unless noted)          [X] Select Day, One:              [X] Episode Day 1, One:                  [X] ACUTE, >= One:                      [X] Lower GI bleeding, Both:                          [X] Hematochezia or melena, >= One:                              [X] Heart rate 100-120/min, sustained                          [X] Intervention, Both:                              [X] Hct or Hb monitoring at least 2x/24h                              [X] IV fluid or blood product transfusion     Version: InterQual® 2017 2  Delroy Montemayor and [employer]  © 2017 [employer] and/or one of its subsidiaries  All Rights Reserved  CPT only © 2016 American [employer] Association  All Rights Reserved          PLEASE SUBMIT THE COMPLETED FORM TO THE DEPARTMENT OF HUMAN SERVICES - DIVISION OF CLINICAL  REVIEW VIA FAX [phone] or VIA E-MAIL TO [email or web address]    Signature: Marnie Barberfisher Date:  03/13/19    Confidentiality Notice: The documents accompanying this telecopy may contain confidential information belonging to the sender  The information is intended only for the use of the individual named above  If you are not the intended recipient, you are hereby notified  That any disclosure, copying, distribution or taking of any telecopy is strictly prohibited

## 2019-04-03 ENCOUNTER — TELEPHONE (OUTPATIENT)
Dept: FAMILY MEDICINE CLINIC | Facility: HOSPITAL | Age: 64
End: 2019-04-03

## 2019-06-06 LAB
LEFT EYE DIABETIC RETINOPATHY: NORMAL
RIGHT EYE DIABETIC RETINOPATHY: NORMAL

## 2019-08-13 ENCOUNTER — HOSPITAL ENCOUNTER (OUTPATIENT)
Dept: RADIOLOGY | Facility: HOSPITAL | Age: 64
Discharge: HOME | End: 2019-08-13
Attending: INTERNAL MEDICINE
Payer: COMMERCIAL

## 2019-08-13 ENCOUNTER — OFFICE VISIT (OUTPATIENT)
Dept: FAMILY MEDICINE CLINIC | Facility: HOSPITAL | Age: 64
End: 2019-08-13
Payer: COMMERCIAL

## 2019-08-13 VITALS
BODY MASS INDEX: 33.81 KG/M2 | WEIGHT: 190.8 LBS | DIASTOLIC BLOOD PRESSURE: 100 MMHG | TEMPERATURE: 99.1 F | HEIGHT: 63 IN | HEART RATE: 89 BPM | OXYGEN SATURATION: 96 % | SYSTOLIC BLOOD PRESSURE: 172 MMHG

## 2019-08-13 DIAGNOSIS — M79.674 PAIN AND SWELLING OF TOE OF RIGHT FOOT: ICD-10-CM

## 2019-08-13 DIAGNOSIS — R91.1 RIGHT LOWER LOBE PULMONARY NODULE: ICD-10-CM

## 2019-08-13 DIAGNOSIS — M79.89 PAIN AND SWELLING OF TOE OF RIGHT FOOT: ICD-10-CM

## 2019-08-13 DIAGNOSIS — E11.65 TYPE 2 DIABETES MELLITUS WITH HYPERGLYCEMIA, WITHOUT LONG-TERM CURRENT USE OF INSULIN (HCC): ICD-10-CM

## 2019-08-13 DIAGNOSIS — K86.89 DILATION OF PANCREATIC DUCT: ICD-10-CM

## 2019-08-13 DIAGNOSIS — R91.1 LUNG NODULE: ICD-10-CM

## 2019-08-13 DIAGNOSIS — M79.89 PAIN AND SWELLING OF TOE OF RIGHT FOOT: Primary | ICD-10-CM

## 2019-08-13 DIAGNOSIS — E04.2 MULTIPLE THYROID NODULES: ICD-10-CM

## 2019-08-13 DIAGNOSIS — K21.9 GASTROESOPHAGEAL REFLUX DISEASE, ESOPHAGITIS PRESENCE NOT SPECIFIED: Chronic | ICD-10-CM

## 2019-08-13 DIAGNOSIS — M79.674 PAIN AND SWELLING OF TOE OF RIGHT FOOT: Primary | ICD-10-CM

## 2019-08-13 PROBLEM — K92.2 ACUTE GI BLEEDING: Status: RESOLVED | Noted: 2018-10-27 | Resolved: 2019-08-13

## 2019-08-13 PROBLEM — R10.30 LOWER ABDOMINAL PAIN: Status: RESOLVED | Noted: 2018-10-27 | Resolved: 2019-08-13

## 2019-08-13 PROBLEM — K92.1 MELENA: Status: RESOLVED | Noted: 2018-10-27 | Resolved: 2019-08-13

## 2019-08-13 PROCEDURE — 99214 OFFICE O/P EST MOD 30 MIN: CPT | Performed by: INTERNAL MEDICINE

## 2019-08-13 PROCEDURE — 3725F SCREEN DEPRESSION PERFORMED: CPT | Performed by: INTERNAL MEDICINE

## 2019-08-13 PROCEDURE — 73630 X-RAY EXAM OF FOOT: CPT

## 2019-08-13 RX ORDER — LANOLIN ALCOHOL/MO/W.PET/CERES
1 CREAM (GRAM) TOPICAL 3 TIMES DAILY
COMMUNITY

## 2019-08-13 RX ORDER — CHLORAL HYDRATE 500 MG
1000 CAPSULE ORAL DAILY
COMMUNITY

## 2019-08-13 RX ORDER — PROPRANOLOL/HYDROCHLOROTHIAZID 40 MG-25MG
TABLET ORAL
COMMUNITY

## 2019-08-13 RX ORDER — SACCHAROMYCES BOULARDII 250 MG
250 CAPSULE ORAL 2 TIMES DAILY
COMMUNITY

## 2019-08-13 RX ORDER — MULTIVIT WITH MINERALS/LUTEIN
1000 TABLET ORAL DAILY
COMMUNITY

## 2019-08-13 NOTE — PATIENT INSTRUCTIONS
Do labs and right foot xray today   refer to [id number] for evaluation   may nbeeed to see gi again about foot pain

## 2019-08-13 NOTE — PROGRESS NOTES
Assessment/Plan:             Problem List Items Addressed This Visit        Digestive    GERD (gastroesophageal reflux disease) (Chronic)    Dilation of pancreatic duct     Patient had MRI done October 29, 2018 which showed abnormalities of the pancreatic duct was to have a 3 month follow-up MRI ordered but she had not followed through with GI now presents in our office  She is not inclined to have a repeat MRI possible and therefore will order a CT of abdomen to evaluate this further            Endocrine    Type 2 diabetes mellitus with hyperglycemia, without long-term current use of insulin (HCC)    Relevant Orders    Vitamin D 25 hydroxy (Completed)    Comprehensive metabolic panel (Completed)    IVÁN Screen w/ Reflex to Titer/Pattern    Hemoglobin A1C (Completed)    Microalbumin / creatinine urine ratio    Ambulatory referral to Podiatry    Multiple thyroid nodules     Had seen Dr Rudolph Ludwig remotely in the past for this            Other    Right lower lobe pulmonary nodule     Patient needs CT scan of the chest this was recommended to be done 6-12 months after the initial CT scan  Noted on CT of the abdomen pelvis done October 27, 2018 -7 mm             Other Visit Diagnoses     Pain and swelling of toe of right foot    -  Primary    Relevant Orders    Vitamin D 25 hydroxy (Completed)    Sedimentation rate, automated (Completed)    Uric acid (Completed)    CBC and differential (Completed)    Comprehensive metabolic panel (Completed)    IVÁN Screen w/ Reflex to Titer/Pattern    Lung nodule        Relevant Orders    CT chest abdomen pelvis w contrast            Subjective:      Patient ID: Johnny Muller is a 62 y o  female    1   Foot pain- initial xray 2 years ago   2 diffuse myalgia- will check sed rate to r/o pmr      The following portions of the patient's history were reviewed and updated as appropriate: allergies, current medications and problem list      Review of Systems   Constitutional: Positive for activity change  Due to foot pain   HENT: Positive for congestion  Respiratory: Negative for chest tightness and shortness of breath  Cardiovascular: Negative for chest pain and palpitations  Gastrointestinal: Negative for abdominal distention and abdominal pain  Genitourinary: Negative for difficulty urinating  Musculoskeletal: Negative for arthralgias  All other systems reviewed and are negative  Objective:      Current Outpatient Medications:     acetaminophen (TYLENOL) 325 mg tablet, Take 650 mg by mouth every 6 (six) hours as needed for mild pain, Disp: , Rfl:     Ascorbic Acid (VITAMIN C) 100 MG tablet, Take 100 mg by mouth daily, Disp: , Rfl:     b complex vitamins capsule, Take 1 capsule by mouth daily, Disp: , Rfl:     cholecalciferol (VITAMIN D3) 400 units tablet, Take 400 Units by mouth daily, Disp: , Rfl:     glucosamine-chondroitin 500-400 MG tablet, Take 1 tablet by mouth 3 (three) times a day, Disp: , Rfl:     Omega-3 Fatty Acids (FISH OIL) 1,000 mg, Take 1,000 mg by mouth daily, Disp: , Rfl:     saccharomyces boulardii (FLORASTOR) 250 mg capsule, Take 250 mg by mouth 2 (two) times a day, Disp: , Rfl:     Turmeric 500 MG CAPS, Take by mouth, Disp: , Rfl:     vitamin A 7500 UNIT capsule, Take 7,500 Units by mouth daily, Disp: , Rfl:     vitamin E, tocopherol, 1,000 units capsule, Take 1,000 Units by mouth daily, Disp: , Rfl:     furosemide (LASIX) 20 mg tablet, Take 1 tablet (20 mg total) by mouth daily (Patient not taking: Reported on 8/13/2019), Disp: 30 tablet, Rfl: 3    pantoprazole (PROTONIX) 40 mg tablet, Take 1 tablet (40 mg total) by mouth 2 (two) times a day for 30 days, Disp: 60 tablet, Rfl: 0    Blood pressure (!) 172/100, pulse 89, temperature 99 1 °F (37 3 °C), temperature source Tympanic, height 5' 2 5" (1 588 m), weight 86 5 kg (190 lb 12 8 oz), SpO2 96 %  Physical Exam   Constitutional: She is oriented to person, place, and time   She appears well-developed and well-nourished  No distress  HENT:   Right Ear: External ear normal    Left Ear: External ear normal    Mouth/Throat: Oropharynx is clear and moist    Eyes: Conjunctivae are normal  Right eye exhibits no discharge  Left eye exhibits no discharge  No scleral icterus  Neck: Neck supple  No thyromegaly present  Cardiovascular: Normal rate and regular rhythm  No murmur heard  Pulmonary/Chest: Breath sounds normal  No respiratory distress  She has no wheezes  She has no rales  Abdominal: Soft  Bowel sounds are normal    Did not see gi after hosptial stay for gi bleed in past   Musculoskeletal: She exhibits edema  She exhibits no tenderness  Plus one bilateral edema   Lymphadenopathy:     She has no cervical adenopathy  Neurological: She is alert and oriented to person, place, and time  No cranial nerve deficit  She exhibits normal muscle tone  Skin: Skin is warm and dry  No rash noted  Psychiatric: She has a normal mood and affect  Her behavior is normal  Judgment and thought content normal    Nursing note and vitals reviewed

## 2019-08-13 NOTE — PROGRESS NOTES
Assessment/Plan:             Problem List Items Addressed This Visit     None      Visit Diagnoses     Pain and swelling of toe of right foot    -  Primary            Subjective:      Patient ID: Syd Lopes is a 62 y o  female    1  Right foot injury-2 years ago  had been seen at urgent care and had lateral ankle swelling and given splint  Since then is using compression stockings  Works as extra job as  and on feet for 4-5 hours then has increased pain in foot  Christina Rios Has a hx of hammertoes  She tries to exercise regularly and biking and walking but no running currently  She never was diagnosed but she is concerned she is having  Gout  Both parents had arthritis issues in neck , hands and a back  Had sport ortho at [facility] for knee issues- considering having or for" scraping about 5 years ago  Got another opinon but did not go for surgery or injections  2  Stiffness in feet bilaterall in AM  Takes about 90 minutes  to help with stiffness  With swelling she wsa doing an epsom salt bathe at night  Some pain with sleeping- uses prn tylenol  The following portions of the patient's history were reviewed and updated as appropriate: allergies, current medications and problem list      Review of Systems   Constitutional: Negative for chills and fever  Musculoskeletal: Positive for gait problem and joint swelling  Negative for back pain           Objective:      Current Outpatient Medications:     acetaminophen (TYLENOL) 325 mg tablet, Take 650 mg by mouth every 6 (six) hours as needed for mild pain, Disp: , Rfl:     Ascorbic Acid (VITAMIN C) 100 MG tablet, Take 100 mg by mouth daily, Disp: , Rfl:     b complex vitamins capsule, Take 1 capsule by mouth daily, Disp: , Rfl:     cholecalciferol (VITAMIN D3) 400 units tablet, Take 400 Units by mouth daily, Disp: , Rfl:     glucosamine-chondroitin 500-400 MG tablet, Take 1 tablet by mouth 3 (three) times a day, Disp: , Rfl:     Omega-3 Fatty Acids (FISH OIL) 1,000 mg, Take 1,000 mg by mouth daily, Disp: , Rfl:     saccharomyces boulardii (FLORASTOR) 250 mg capsule, Take 250 mg by mouth 2 (two) times a day, Disp: , Rfl:     Turmeric 500 MG CAPS, Take by mouth, Disp: , Rfl:     vitamin A 7500 UNIT capsule, Take 7,500 Units by mouth daily, Disp: , Rfl:     vitamin E, tocopherol, 1,000 units capsule, Take 1,000 Units by mouth daily, Disp: , Rfl:     furosemide (LASIX) 20 mg tablet, Take 1 tablet (20 mg total) by mouth daily (Patient not taking: Reported on 8/13/2019), Disp: 30 tablet, Rfl: 3    pantoprazole (PROTONIX) 40 mg tablet, Take 1 tablet (40 mg total) by mouth 2 (two) times a day for 30 days, Disp: 60 tablet, Rfl: 0    Blood pressure (!) 172/100, pulse 89, temperature 99 1 °F (37 3 °C), temperature source Tympanic, height 5' 2 5" (1 588 m), weight 86 5 kg (190 lb 12 8 oz), SpO2 96 %       Physical Exam

## 2019-08-15 LAB
25(OH)D3+25(OH)D2 SERPL-MCNC: 24.3 NG/ML (ref 30–100)
ALBUMIN SERPL-MCNC: 4 G/DL (ref 3.6–4.8)
ALBUMIN/GLOB SERPL: 1.8 {RATIO} (ref 1.2–2.2)
ALP SERPL-CCNC: 76 IU/L (ref 39–117)
ALT SERPL-CCNC: 21 IU/L (ref 0–32)
ANA INTERCELL BRIDGE TITR SER IF: NORMAL {TITER}
ANA TITR SER IF: POSITIVE {TITER}
AST SERPL-CCNC: 16 IU/L (ref 0–40)
BASOPHILS # BLD AUTO: 0 X10E3/UL (ref 0–0.2)
BASOPHILS NFR BLD AUTO: 0 %
BILIRUB SERPL-MCNC: 0.3 MG/DL (ref 0–1.2)
BUN SERPL-MCNC: 12 MG/DL (ref 8–27)
BUN/CREAT SERPL: 21 (ref 12–28)
CALCIUM SERPL-MCNC: 9 MG/DL (ref 8.7–10.3)
CHLORIDE SERPL-SCNC: 104 MMOL/L (ref 96–106)
CO2 SERPL-SCNC: 23 MMOL/L (ref 20–29)
CREAT SERPL-MCNC: 0.56 MG/DL (ref 0.57–1)
EOSINOPHIL # BLD AUTO: 0.1 X10E3/UL (ref 0–0.4)
EOSINOPHIL NFR BLD AUTO: 2 %
ERYTHROCYTE [DISTWIDTH] IN BLOOD BY AUTOMATED COUNT: 15.9 % (ref 12.3–15.4)
ERYTHROCYTE [SEDIMENTATION RATE] IN BLOOD BY WESTERGREN METHOD: 11 MM/HR (ref 0–40)
EST. AVERAGE GLUCOSE BLD GHB EST-MCNC: 226 MG/DL
GLOBULIN SER-MCNC: 2.2 G/DL (ref 1.5–4.5)
GLUCOSE SERPL-MCNC: 151 MG/DL (ref 65–99)
HBA1C MFR BLD: 9.5 % (ref 4.8–5.6)
HCT VFR BLD AUTO: 41.7 % (ref 34–46.6)
HGB BLD-MCNC: 14.1 G/DL (ref 11.1–15.9)
IMM GRANULOCYTES # BLD: 0 X10E3/UL (ref 0–0.1)
IMM GRANULOCYTES NFR BLD: 0 %
LYMPHOCYTES # BLD AUTO: 1.9 X10E3/UL (ref 0.7–3.1)
LYMPHOCYTES NFR BLD AUTO: 33 %
MCH RBC QN AUTO: 28.8 PG (ref 26.6–33)
MCHC RBC AUTO-ENTMCNC: 33.8 G/DL (ref 31.5–35.7)
MCV RBC AUTO: 85 FL (ref 79–97)
MONOCYTES # BLD AUTO: 0.5 X10E3/UL (ref 0.1–0.9)
MONOCYTES NFR BLD AUTO: 8 %
NEUTROPHILS # BLD AUTO: 3.2 X10E3/UL (ref 1.4–7)
NEUTROPHILS NFR BLD AUTO: 57 %
PLATELET # BLD AUTO: 214 X10E3/UL (ref 150–450)
POTASSIUM SERPL-SCNC: 4.1 MMOL/L (ref 3.5–5.2)
PROT SERPL-MCNC: 6.2 G/DL (ref 6–8.5)
RBC # BLD AUTO: 4.89 X10E6/UL (ref 3.77–5.28)
SL AMB EGFR AFRICAN AMERICAN: 115 ML/MIN/1.73
SL AMB EGFR NON AFRICAN AMERICAN: 100 ML/MIN/1.73
SL AMB NOTE:: NORMAL
SODIUM SERPL-SCNC: 141 MMOL/L (ref 134–144)
URATE SERPL-MCNC: 4.5 MG/DL (ref 2.5–7.1)
WBC # BLD AUTO: 5.8 X10E3/UL (ref 3.4–10.8)

## 2019-08-15 PROCEDURE — 3046F HEMOGLOBIN A1C LEVEL >9.0%: CPT | Performed by: PHYSICIAN ASSISTANT

## 2019-08-16 ENCOUNTER — TELEPHONE (OUTPATIENT)
Dept: FAMILY MEDICINE CLINIC | Facility: HOSPITAL | Age: 64
End: 2019-08-16

## 2019-08-16 DIAGNOSIS — E11.65 TYPE 2 DIABETES MELLITUS WITH HYPERGLYCEMIA, WITHOUT LONG-TERM CURRENT USE OF INSULIN (HCC): ICD-10-CM

## 2019-08-16 DIAGNOSIS — R79.89 LOW VITAMIN D LEVEL: ICD-10-CM

## 2019-08-16 DIAGNOSIS — I10 ESSENTIAL HYPERTENSION: Primary | Chronic | ICD-10-CM

## 2019-08-16 NOTE — TELEPHONE ENCOUNTER
DR Priscilla Shafer,  I JUST RECEIVED A FAX THAT THIS WAS DENIED - WHAT SHOULD I TELL PATIENT?  (I WON'T BE IN NEXT WEEK - CAN YOU SEND REPLY TO LINDA)

## 2019-08-16 NOTE — TELEPHONE ENCOUNTER
NOTES DO NOT STATE HOW LONG PATIENT HAS HAD THIS PROBLEM AND IF IT IS GETTING WORSE - THERE ARE NO FINDINGS ON PRIOR TESTING     NEEDS PEER TO PEER TO:  [phone]

## 2019-08-19 DIAGNOSIS — E11.65 TYPE 2 DIABETES MELLITUS WITH HYPERGLYCEMIA, WITHOUT LONG-TERM CURRENT USE OF INSULIN (HCC): Primary | ICD-10-CM

## 2019-08-20 PROBLEM — E04.2 MULTIPLE THYROID NODULES: Status: ACTIVE | Noted: 2019-08-20

## 2019-08-20 PROBLEM — K25.3 ACUTE GASTRIC ULCER WITHOUT HEMORRHAGE OR PERFORATION: Status: RESOLVED | Noted: 2018-10-28 | Resolved: 2019-08-20

## 2019-08-20 PROBLEM — R91.1 RIGHT LOWER LOBE PULMONARY NODULE: Status: ACTIVE | Noted: 2019-08-20

## 2019-08-20 NOTE — ASSESSMENT & PLAN NOTE
Patient had MRI done October 29, 2018 which showed abnormalities of the pancreatic duct was to have a 3 month follow-up MRI ordered but she had not followed through with GI now presents in our office    She is not inclined to have a repeat MRI possible and therefore will order a CT of abdomen to evaluate this further

## 2019-08-20 NOTE — ASSESSMENT & PLAN NOTE
Patient needs CT scan of the chest this was recommended to be done 6-12 months after the initial CT scan  Noted on CT of the abdomen pelvis done October 27, 2018 -7 mm

## 2019-08-23 ENCOUNTER — DOCUMENTATION (OUTPATIENT)
Dept: FAMILY MEDICINE CLINIC | Facility: HOSPITAL | Age: 64
End: 2019-08-23

## 2019-08-26 NOTE — TELEPHONE ENCOUNTER
I called last week and they told me they were going to fax a denial letter said that we could put in an official appeal as they would not then schedule me for a peer to peer review    If he could but the information of on my desk for a in appeal I will try to get that under way

## 2019-08-27 ENCOUNTER — OFFICE VISIT (OUTPATIENT)
Dept: PODIATRY | Facility: CLINIC | Age: 64
End: 2019-08-27
Payer: COMMERCIAL

## 2019-08-27 VITALS
SYSTOLIC BLOOD PRESSURE: 195 MMHG | DIASTOLIC BLOOD PRESSURE: 102 MMHG | HEART RATE: 91 BPM | BODY MASS INDEX: 33.13 KG/M2 | WEIGHT: 180 LBS | HEIGHT: 62 IN

## 2019-08-27 DIAGNOSIS — M19.071 OSTEOARTHRITIS OF RIGHT ANKLE AND FOOT: Primary | ICD-10-CM

## 2019-08-27 DIAGNOSIS — I10 ESSENTIAL HYPERTENSION: Chronic | ICD-10-CM

## 2019-08-27 DIAGNOSIS — E11.65 TYPE 2 DIABETES MELLITUS WITH HYPERGLYCEMIA, WITHOUT LONG-TERM CURRENT USE OF INSULIN (HCC): ICD-10-CM

## 2019-08-27 DIAGNOSIS — M20.41 HAMMER TOES OF BOTH FEET: ICD-10-CM

## 2019-08-27 DIAGNOSIS — M20.42 HAMMER TOES OF BOTH FEET: ICD-10-CM

## 2019-08-27 PROCEDURE — 99243 OFF/OP CNSLTJ NEW/EST LOW 30: CPT | Performed by: PODIATRIST

## 2019-08-27 NOTE — PROGRESS NOTES
Assessment/Plan:       Diagnoses and all orders for this visit:    Osteoarthritis of right ankle and foot  Comments:  Dispense arch supports in office  educated on proper shoe gear  Type 2 diabetes mellitus with hyperglycemia, without long-term current use of insulin ([facility] )  -     Ambulatory referral to Podiatry    Hammer toes of both feet  Comments:  hammertoe splints dispensed, monitor for now, gel padding suggested  Essential hypertension      -Educated on DM risk to lower extremities, proper shoe gear, and daily monitoring of feet    -Educated on A1C and the risks of poorly controlled Diabetes   -Discussed weight loss and suitable exercise regiment  - Viewed x-rays with patient  There appears to be some age-related osteoarthritis throughout the foot  I am hoping with extra support and shoe gear modification this can be treated conservatively  Subjective:      Patient ID: Marlena Calix is a 62 y o  female  Patient arrives with painful arthritis  In the mornings she feels like she is walking on ice  She was tested for gout which was negative  The right ankle swells from time to time  She has sprained it severely previously and is worried it never fully healed  She wears a slide on elastic brace  She walks every day, 2-3 miles  She is a  and on her feet all the time  Epsom salt soaks feel good after work  Her feet don't feel numb or burning, just icy  It tingles at night and often keeps her awake  She is recently diagnosed diabetic, her A1C a few weeks ago was 9 5  She often wears sneakers          The following portions of the patient's history were reviewed and updated as appropriate: allergies, current medications, past family history, past medical history, past social history, past surgical history and problem list     Review of Systems      Objective:      BP (!) 195/102 Comment: pt states white coat syndrome  Pulse 91   Ht 5' 2" (1 575 m)   Wt 81 6 kg (180 lb)   LMP  (LMP Unknown)   BMI 32 92 kg/m²          Physical Exam   Cardiovascular: Pulses are no weak pulses  Pulses:       Dorsalis pedis pulses are 2+ on the right side, and 2+ on the left side  Posterior tibial pulses are 2+ on the right side, and 2+ on the left side  Musculoskeletal:        Right ankle: Normal  No tenderness  No lateral malleolus, no medial malleolus, no AITFL, no CF ligament and no posterior TFL tenderness found  Achilles tendon exhibits no pain, no defect and normal Mathias's test results  Feet:   Right Foot:   Skin Integrity: Negative for ulcer, skin breakdown, erythema or callus  Left Foot:   Skin Integrity: Negative for ulcer, skin breakdown, erythema or callus  Diabetic Foot Exam    Patient's shoes and socks removed  Right Foot/Ankle   Right Foot Inspection  Skin Exam: skin intact no callus, no erythema, no maceration, no abnormal color, no ulcer and no callus                          Toe Exam: swelling and tenderness (vague metatarsal head tenderness plantarly)erythema  Sensory   Vibration: absent and intact  Proprioception: intact   Monofilament testing: intact  Vascular  Capillary refills: < 3 seconds  The right DP pulse is 2+  The right PT pulse is 2+  Right Toe  - Comprehensive Exam  Arch: pes planus  Hammertoes: second toe, third toe and first toe  Swelling: dorsum and metatarsals         Left Foot/Ankle  Left Foot Inspection  Skin Exam: skin intactno erythema, no maceration, normal color, no ulcer and no callus                         Toe Exam: swelling and tendernessno erythema                   Sensory   Vibration: intact  Proprioception: intact  Monofilament: intact  Vascular  Capillary refills: < 3 seconds  The left DP pulse is 2+  The left PT pulse is 2+  Left Toe  - Comprehensive Exam  Arch: pes planus  Hammertoes: second toe, third toe and first toe  Swelling: dorsum and metatarsals       Assign Risk Category:  Deformity present; No loss of protective sensation;  No weak pulses       Risk: 1      XRay right foot  1  Hammertoe deformity noted  2  Medial column spurring right midfoot  3   Plantar spur

## 2019-09-10 ENCOUNTER — APPOINTMENT (OUTPATIENT)
Dept: RADIOLOGY | Facility: CLINIC | Age: 64
End: 2019-09-10
Payer: COMMERCIAL

## 2019-09-10 ENCOUNTER — OFFICE VISIT (OUTPATIENT)
Dept: URGENT CARE | Facility: CLINIC | Age: 64
End: 2019-09-10
Payer: COMMERCIAL

## 2019-09-10 VITALS
HEART RATE: 78 BPM | TEMPERATURE: 99.4 F | OXYGEN SATURATION: 96 % | RESPIRATION RATE: 14 BRPM | HEIGHT: 63 IN | BODY MASS INDEX: 32.78 KG/M2 | SYSTOLIC BLOOD PRESSURE: 166 MMHG | WEIGHT: 185 LBS | DIASTOLIC BLOOD PRESSURE: 88 MMHG

## 2019-09-10 DIAGNOSIS — M79.674 TOE PAIN, RIGHT: ICD-10-CM

## 2019-09-10 DIAGNOSIS — S92.504A NONDISPLACED UNSPECIFIED FRACTURE OF RIGHT LESSER TOE(S), INITIAL ENCOUNTER FOR CLOSED FRACTURE: Primary | ICD-10-CM

## 2019-09-10 PROCEDURE — 99283 EMERGENCY DEPT VISIT LOW MDM: CPT | Performed by: PHYSICIAN ASSISTANT

## 2019-09-10 PROCEDURE — 73630 X-RAY EXAM OF FOOT: CPT

## 2019-09-10 PROCEDURE — G0382 LEV 3 HOSP TYPE B ED VISIT: HCPCS | Performed by: PHYSICIAN ASSISTANT

## 2019-09-10 NOTE — PATIENT INSTRUCTIONS
Ice to the area  Eat Your Kimchi op shoe as needed to help with walking   F/u with PCP in 2-3 days for re-check   Sooner if anything changes or worsens

## 2019-09-10 NOTE — PROGRESS NOTES
NAME: Claudean Gutta is a 62 y o  female  : 1955    MRN: [id number]      Assessment and Plan   Nondisplaced unspecified fracture of right lesser toe(s), initial encounter for closed fracture [S92 [id number]]  1  Nondisplaced unspecified fracture of right lesser toe(s), initial encounter for closed fracture  Post Op Shoe   2  Toe pain, right  XR foot 3+ vw right      postop shoe applied in clinic    X-ray right foot:  Nondisplaced fracture to the base of the distal phalanx of the 5th toe      Patient Instructions   Patient Instructions   Ice to the area  Elevate  Post op shoe as needed to help with walking   F/u with PCP in 2-3 days for re-check   Sooner if anything changes or worsens     Proceed to ER if symptoms worsen  Chief Complaint     Chief Complaint   Patient presents with    Toe Pain     64yof complaining of right toe pain after hitting her foot on the sofa earlier today  Pinky toe is bruised         History of Present Illness   Patient with pmhx DM and OA presents complaining of right little toe pain x 1 day  States this afternoon she was walking in her living room and accidentally jammed her little toe on the leg of the couch  She states she has had pain and some bruising to the area since this occurred  She states she has some pain with walking and wearing flip flops but states she is unable to get her sneaker on  She states she is a  and is worried about putting her sneaker on and walking all day at work  Denies any numbness or tingling to the toe  Reports she took Tylenol without much improvement        Review of Systems   Review of Systems   Musculoskeletal:        Right little toe pain         Current Medications       Current Outpatient Medications:     acetaminophen (TYLENOL) 325 mg tablet, Take 650 mg by mouth every 6 (six) hours as needed for mild pain, Disp: , Rfl:     Ascorbic Acid (VITAMIN C) 100 MG tablet, Take 100 mg by mouth daily, Disp: , Rfl:     b complex vitamins capsule, Take 1 capsule by mouth daily, Disp: , Rfl:     Cholecalciferol (VITAMIN D3) 2000 units TABS, Take 400 Units by mouth daily , Disp: , Rfl:     furosemide (LASIX) 20 mg tablet, Take 1 tablet (20 mg total) by mouth daily (Patient not taking: Reported on 8/13/2019), Disp: 30 tablet, Rfl: 3    glucosamine-chondroitin 500-400 MG tablet, Take 1 tablet by mouth 3 (three) times a day, Disp: , Rfl:     Omega-3 Fatty Acids (FISH OIL) 1,000 mg, Take 1,000 mg by mouth daily, Disp: , Rfl:     pantoprazole (PROTONIX) 40 mg tablet, Take 1 tablet (40 mg total) by mouth 2 (two) times a day for 30 days, Disp: 60 tablet, Rfl: 0    saccharomyces boulardii (FLORASTOR) 250 mg capsule, Take 250 mg by mouth 2 (two) times a day, Disp: , Rfl:     Turmeric 500 MG CAPS, Take by mouth, Disp: , Rfl:     vitamin A 7500 UNIT capsule, Take 7,500 Units by mouth daily, Disp: , Rfl:     vitamin E, tocopherol, 1,000 units capsule, Take 1,000 Units by mouth daily, Disp: , Rfl:     Current Allergies     Allergies as of 09/10/2019 - Reviewed 09/10/2019   Allergen Reaction Noted    Prednisone  10/27/2018    Corticosteroids Anxiety 10/05/2016              Past Medical History:   Diagnosis Date    Diabetes mellitus ([facility] )     prediabetic     Hypertension     Irritable bowel disease     Vertigo        Past Surgical History:   Procedure Laterality Date    BREAST LUMPECTOMY      ESOPHAGOGASTRODUODENOSCOPY N/A 10/28/2018    Procedure: ESOPHAGOGASTRODUODENOSCOPY (EGD); Surgeon: Iliana Torres MD;  Location:  MAIN OR;  Service: Gastroenterology    HYSTERECTOMY         Family History   Problem Relation Age of Onset    Substance Abuse Father         alcohol    Alcohol abuse Father     Mental illness Neg Hx          Medications have been verified      The following portions of the patient's history were reviewed and updated as appropriate: allergies, current medications, past family history, past medical history, past social history, past surgical history and problem list     Objective   /88 (BP Location: Right arm, Patient Position: Sitting, Cuff Size: Standard)   Pulse 78   Temp 99 4 °F (37 4 °C)   Resp 14   Ht 5' 3" (1 6 m)   Wt 83 9 kg (185 lb)   LMP  (LMP Unknown)   SpO2 96%   BMI 32 77 kg/m²      Physical Exam     Physical Exam   Constitutional: She appears well-developed and well-nourished  No distress  Musculoskeletal:   Right little toe:  Ecchymotic over the IP joint  No erythema or abrasion or laceration  Tender to palpation over the distal phalanx and over the IP joint  No subungual hematoma  No damage to nail  Able flex partially with pain  Capillary refill at the distal tip is less than 2 seconds  Sensation is intact  Skin: She is not diaphoretic  Vitals reviewed

## 2019-09-10 NOTE — LETTER
September 10, 2019     Patient: Cayla Flores   YOB: 1955   Date of Visit: 9/10/2019       To Whom it May Concern:    Osvaldo Alverto was seen in my clinic on 9/10/2019  She may return to work on 9/12/2019  If you have any questions or concerns, please don't hesitate to call           Sincerely,          Natasha Flor PA-C        CC: No Recipients

## 2019-09-12 ENCOUNTER — OFFICE VISIT (OUTPATIENT)
Dept: FAMILY MEDICINE CLINIC | Facility: HOSPITAL | Age: 64
End: 2019-09-12
Payer: COMMERCIAL

## 2019-09-12 VITALS
HEIGHT: 63 IN | WEIGHT: 185 LBS | DIASTOLIC BLOOD PRESSURE: 78 MMHG | RESPIRATION RATE: 16 BRPM | BODY MASS INDEX: 32.78 KG/M2 | HEART RATE: 89 BPM | SYSTOLIC BLOOD PRESSURE: 140 MMHG

## 2019-09-12 DIAGNOSIS — M79.671 RIGHT FOOT PAIN: ICD-10-CM

## 2019-09-12 DIAGNOSIS — S99.921D INJURY OF RIGHT FOOT, SUBSEQUENT ENCOUNTER: Primary | ICD-10-CM

## 2019-09-12 PROCEDURE — 99213 OFFICE O/P EST LOW 20 MIN: CPT | Performed by: PHYSICIAN ASSISTANT

## 2019-09-12 PROCEDURE — 3008F BODY MASS INDEX DOCD: CPT | Performed by: PHYSICIAN ASSISTANT

## 2019-09-12 RX ORDER — OMEPRAZOLE 20 MG/1
TABLET, DELAYED RELEASE ORAL
Status: ON HOLD | COMMUNITY
End: 2021-10-12 | Stop reason: ALTCHOICE

## 2019-09-12 NOTE — PATIENT INSTRUCTIONS
Excuse given for work to return Monday, 9/16/19  Continue rest, and elevation, also Tylenol for pain

## 2019-09-12 NOTE — PROGRESS NOTES
Assessment/Plan:    Right foot pain 2nd  To injury- 9/10/19  Follow up from urgent care 9/10/19       Subjective:      Patient ID: Gena Reyes is a 62 y o  female  62year old white female presents for follow up after right foot injury and urgent care visit  Works at [employer], which is very busy  Xray showed fractured toe/digit right foot  Taking Tylenol arthritis, tries not to taken NSAID's due to abdominal pain  Has been monitoring glucose, they are much improved  Requesting to stay off feet to heal until Monday  Review of Systems   Gastrointestinal: Negative for abdominal pain, nausea and vomiting  Musculoskeletal: Positive for arthralgias  Negative for back pain, myalgias, neck pain and neck stiffness  Neurological: Negative for numbness  Objective:      /78   Pulse 89   Resp 16   Ht 5' 3" (1 6 m)   Wt 83 9 kg (185 lb)   LMP  (LMP Unknown)   BMI 32 77 kg/m²          Physical Exam   Constitutional: She is oriented to person, place, and time  She appears well-developed and well-nourished  No distress  Cardiovascular: Normal rate, regular rhythm and normal heart sounds  Pulmonary/Chest: Effort normal and breath sounds normal  No stridor  No respiratory distress  She has no wheezes  She has no rales  Musculoskeletal: Normal range of motion  She exhibits edema and tenderness  She exhibits no deformity  Both feet swollen, right  to palpation, around 2 last digits, slightly bruised  Able to move all ext  Neurological: She is alert and oriented to person, place, and time  Skin: She is not diaphoretic  Nursing note and vitals reviewed

## 2019-09-23 ENCOUNTER — CLINICAL SUPPORT (OUTPATIENT)
Dept: NUTRITION | Facility: HOSPITAL | Age: 64
End: 2019-09-23
Attending: INTERNAL MEDICINE
Payer: COMMERCIAL

## 2019-09-23 VITALS — WEIGHT: 187.2 LBS | BODY MASS INDEX: 33.16 KG/M2

## 2019-09-23 DIAGNOSIS — E11.65 TYPE 2 DIABETES MELLITUS WITH HYPERGLYCEMIA, WITHOUT LONG-TERM CURRENT USE OF INSULIN (HCC): ICD-10-CM

## 2019-09-23 PROCEDURE — 97802 MEDICAL NUTRITION INDIV IN: CPT

## 2019-09-23 NOTE — PROGRESS NOTES
Initial Nutrition Assessment Form    Patient Name: Claudean Gutta    YOB: 1955    Sex: Female     Assessment Date: 9/23/2019  Start Time: 80 Stop Time: [place] Total Minutes: 61     Data:  Present at session: self   Parent Concerns: n/a   Medical Dx/Reason for Referral: DM   Past Medical History:   Diagnosis Date    Diabetes mellitus ([address] )     prediabetic     Hypertension     Irritable bowel disease     Vertigo        Current Outpatient Medications   Medication Sig Dispense Refill    acetaminophen (TYLENOL) 325 mg tablet Take 650 mg by mouth every 6 (six) hours as needed for mild pain      Ascorbic Acid (VITAMIN C) 100 MG tablet Take 100 mg by mouth daily      b complex vitamins capsule Take 1 capsule by mouth daily      Cholecalciferol (VITAMIN D3) 2000 units TABS 1 daily      glucosamine-chondroitin 500-400 MG tablet Take 1 tablet by mouth 3 (three) times a day      Omega-3 Fatty Acids (FISH OIL) 1,000 mg Take 1,000 mg by mouth daily      omeprazole (PriLOSEC OTC) 20 MG tablet 1 daily prn only      saccharomyces boulardii (FLORASTOR) 250 mg capsule Take 250 mg by mouth 2 (two) times a day      Turmeric 500 MG CAPS Take by mouth 1 daily      vitamin A 7500 UNIT capsule Take 7,500 Units by mouth daily      vitamin E, tocopherol, 1,000 units capsule Take 1,000 Units by mouth daily       No current facility-administered medications for this visit           Additional Meds/Supplements: n/a   Special Learning Needs: n/a   Height:   HC Readings from Last 3 Encounters:   No data found for [facility]       Weight: Wt Readings from Last 12 Encounters:   09/23/19 84 9 kg (187 lb 3 2 oz)   09/12/19 83 9 kg (185 lb)   09/10/19 83 9 kg (185 lb)   08/27/19 81 6 kg (180 lb)   08/13/19 86 5 kg (190 lb 12 8 oz)   11/05/18 82 6 kg (182 lb)   10/29/18 83 7 kg (184 lb 8 4 oz)   10/27/18 83 5 kg (184 lb)   10/05/16 83 kg (183 lb)     Estimated body mass index is 33 16 kg/m² as calculated from the following:    Height as of 9/12/19: 5' 3" (1 6 m)  Weight as of this encounter: 84 9 kg (187 lb 3 2 oz)  Usual Weight: #  Ideal Body Weight: #   Recent Weight Change: ?Yes     ? xxNo  Amount:       Energy Needs: No calculation needed   Allergies   Allergen Reactions    Prednisone     Corticosteroids Anxiety    NKFA-?? Shrimp/shellfish?? Social History     Substance and Sexual Activity   Alcohol Use No    n/a   Social History     Tobacco Use   Smoking Status Former Smoker   Smokeless Tobacco Never Used    n/a   Who shops? patient   Who cooks? patient   Exercise:  Yoga; exercise bike now 20 mins 30-40 mins daily vs every other day, walks Broken toe right now   Prior Counseling? ?Yes     ? xxNo  When:      Why:         Diet Hx:martha passed a year in Aug, and sold house; minimal bread/pasta, some diet soda; now gets chick gurpreet a s/w; works 5 days a week typically  Breakfast:  Scrambled egg with 1/2 avocado greek zero yogurt; oatmeal with walnuts and blueberries; herbal tea-hot maybe stevia usally not; decaf iced coffee    730-8  a m  Lunch:  Salad with chix breast egg black beans sprinkle cheese apple cider vinagrette; water diet iced tea lemonade splenda  1230   p m  Dinner:  1/2c beef strips 1/4c cauliflower chix 1c PM ice cream; diet soda, water  530-6   p m  Snacks:  Diet jellos, puddings etc, fruit jello AM -   PM -   HS - 8-9        Nutrition Diagnosis:   Altered Nutrition-Related Laboratory values  related to Kidney, liver, cardiac, endocrine, neurologic, and/or pulmonary dysfunction as evidenced by  Abnormal plasma glucose and/or HgbA1c levels       Medical Nutrition Therapy Intervention:  ?xIndividualized Meal Plan ? Understanding Lab Values   ? Basic Pathophysiology of Disease ? Food/Medication Interactions   ? xFood Diary ? xxExercise   ? xLifestyle/Behavior Modification Techniques ? Medication, Mechanism of Action   ? Label Reading ? Self Blood Glucose Monitoring- when eating is off BG is higher   ?xWeight/BMI Goals ?Other - bed 10-11; sleep right away, awake  2x/nt bathroom; waking up 6-730; no naps   Other Notes:  Did emotional eating prior to dx of DM Works at [employer] a       Comprehension: ?Excellent  ? xxVery Good  ? Good  ? Fair   ? Poor    Receptivity: ?Excellent  ? xxVery Good  ? Good  ? Fair   ? Poor    Expected Compliance: ?Excellent  ? xxVery Good  ? Good  ? Fair   ? Poor        Goals:  1 30-60gms carbs per meal   2  Portions per plate method as discussed with RD   3 regular meals and sleep schedule  4  Activity 150mins per week       No follow-ups on file    Labs:  CMP  Lab Results   Component Value Date    K 4 1 08/14/2019     08/14/2019    CO2 23 08/14/2019    BUN 12 08/14/2019    CREATININE 0 56 (L) 08/14/2019    GLUF 118 (H) 11/19/2018    CALCIUM 8 9 11/19/2018    AST 16 08/14/2019    ALT 21 08/14/2019    ALKPHOS 79 11/19/2018    EGFR 98 11/19/2018       BMP  Lab Results   Component Value Date    CALCIUM 8 9 11/19/2018    K 4 1 08/14/2019    CO2 23 08/14/2019     08/14/2019    BUN 12 08/14/2019    CREATININE 0 56 (L) 08/14/2019       Lipids  No results found for: CHOL  No results found for: HDL  No results found for: LDLCALC  No results found for: TRIG  No results found for: CHOLHDL    Hemoglobin A1C  Lab Results   Component Value Date    HGBA1C 9 5 (H) 08/14/2019       Fasting Glucose  Lab Results   Component Value Date    GLUF 118 (H) 11/19/2018       Insulin     Thyroid  No results found for: TSH, G6SOWYL, D6PUFYQ, THYROIDAB    Hepatic Function Panel  Lab Results   Component Value Date    ALT 21 08/14/2019    AST 16 08/14/2019    ALKPHOS 79 11/19/2018       Celiac Disease Antibody Panel  No results found for: ENDOMYSIAL IGA, GLIADIN IGA, GLIADIN IGG, IGA, TISSUE TRANSGLUT AB, TTG IGA   Iron  Lab Results   Component Value Date    IRON 90 10/28/2018    TIBC 272 10/28/2018    FERRITIN 53 10/28/2018       Vitamins  No results found for: VITAMIN B2   No results found for: NICOTINAMIDE, NICOTINIC ACID   No results found for: Shoaib Verde  No results found for: Caprice Bangura  No results found for: VITB5  No results found for: D5ETYGDC  No results found for: THYROGLB  No results found for: VITAMIN K   25-HYDROXY VIT D   Date Value Ref Range Status   08/14/2019 24 3 (L) 30 0 - 100 0 ng/mL Final     Comment:     Vitamin D deficiency has been defined by the [address] practice guideline as a  level of serum 25-OH vitamin D less than 20 ng/mL (1,2)  The Endocrine Society went on to further define vitamin D  insufficiency as a level between 21 and 29 ng/mL (2)  1  IOM ([place] of Medicine)  2010  Dietary reference     intakes for calcium and D  [facility]: The     afterBOT  2  Jack MF, Alistair BETANCUR, Radha NUNEZ, et al      Evaluation, treatment, and prevention of vitamin D     deficiency: an Endocrine Society clinical practice     guideline  JCEM  2011 Jul; 96(7):1911-30          No components found for: [address] [address]

## 2019-09-28 ENCOUNTER — APPOINTMENT (OUTPATIENT)
Dept: RADIOLOGY | Facility: CLINIC | Age: 64
End: 2019-09-28
Payer: COMMERCIAL

## 2019-09-28 ENCOUNTER — OFFICE VISIT (OUTPATIENT)
Dept: URGENT CARE | Facility: CLINIC | Age: 64
End: 2019-09-28
Payer: COMMERCIAL

## 2019-09-28 VITALS
RESPIRATION RATE: 14 BRPM | TEMPERATURE: 99.5 F | BODY MASS INDEX: 32.78 KG/M2 | HEIGHT: 63 IN | DIASTOLIC BLOOD PRESSURE: 75 MMHG | WEIGHT: 185 LBS | SYSTOLIC BLOOD PRESSURE: 142 MMHG | OXYGEN SATURATION: 97 % | HEART RATE: 93 BPM

## 2019-09-28 DIAGNOSIS — S63.613A SPRAIN OF LEFT MIDDLE FINGER, UNSPECIFIED SITE OF FINGER, INITIAL ENCOUNTER: Primary | ICD-10-CM

## 2019-09-28 DIAGNOSIS — M79.645 PAIN OF LEFT MIDDLE FINGER: ICD-10-CM

## 2019-09-28 PROCEDURE — 99283 EMERGENCY DEPT VISIT LOW MDM: CPT | Performed by: PHYSICIAN ASSISTANT

## 2019-09-28 PROCEDURE — G0382 LEV 3 HOSP TYPE B ED VISIT: HCPCS | Performed by: PHYSICIAN ASSISTANT

## 2019-09-28 PROCEDURE — 73140 X-RAY EXAM OF FINGER(S): CPT

## 2019-09-28 NOTE — PATIENT INSTRUCTIONS
Finger Sprain, Ambulatory Care   GENERAL INFORMATION:   A finger sprain  happens when ligaments in your finger or thumb are stretched or torn  Ligaments are the tough tissues that connect bones  Ligaments allow your hands to grasp and pinch  Common symptoms include the following:   · Bruising or changes in skin color    · Pain and stiffness     · Swelling and tenderness  Seek immediate care for the following symptoms:   · Bluish or pale skin on your injured finger    · Increased pain, even after taking pain medicine    · New or increased trouble moving and using your finger or thumb  Treatment for a finger sprain  may include medicine to decrease pain  Care for a finger sprain:   · Rest  your finger for at least 48 hours  Avoid activities that cause pain  Return to normal activities as directed  · Apply ice  on your finger for 15 to 20 minutes every hour or as directed  Use an ice pack, or put crushed ice in a plastic bag  Cover it with a towel  Ice helps prevent tissue damage and decreases swelling and pain  · Compression  helps support your finger as it heals  Your injured finger may be taped to the finger beside it  Severe sprains may be treated with a splint  Ask how long you must wear the splint or tape, and how to apply them  · Elevate  your finger above the level of your heart as often as you can  This will help decrease swelling and pain  Prop your hand on pillows or blankets to keep it elevated comfortably  · Exercise your finger  to help decrease stiffness, swelling, and pain  You may be given gentle exercises to begin in a few days  Exercises also help improve finger movement  Check with your healthcare provider before you return to your normal activities or sports  Follow up with your healthcare provider as directed:  Write down your questions so you remember to ask them during your visits  CARE AGREEMENT:   You have the right to help plan your care   Learn about your health condition and how it may be treated  Discuss treatment options with your caregivers to decide what care you want to receive  You always have the right to refuse treatment  The above information is an  only  It is not intended as medical advice for individual conditions or treatments  Talk to your doctor, nurse or pharmacist before following any medical regimen to see if it is safe and effective for you  © 2014 [address] is for End User's use only and may not be sold, redistributed or otherwise used for commercial purposes  All illustrations and images included in CareNotes® are the copyrighted property of A D [employer] M [employer]  or [employer]

## 2019-10-07 NOTE — PROGRESS NOTES
Assessment/Plan    Sprain of left middle finger, unspecified site of finger, initial encounter [[id number]]  1  Sprain of left middle finger, unspecified site of finger, initial encounter     2  Pain of left middle finger  XR finger left third digit-middle         Subjective:     Patient ID: Pedro Whaley is a 62 y o  female  Reason For Visit / Chief Complaint  Chief Complaint   Patient presents with    Hand Pain     64yof complaining of left middle finger pain that started yesterday  Pt  complaining of pain going down from her knukle to the palm of her hand  80-year-old female presents to the clinic with left middle finger pain that started yesterday  Patient states that the pain starts at her knuckle in goes down to the palm of her hand  Patient states that the pain is reproducible with movement of the finger  Patient denies any injury but states that she has in the past hurt her finger with her pet leash  Past Medical History:   Diagnosis Date    Diabetes mellitus ([address] )     prediabetic     Hypertension     Irritable bowel disease     Vertigo        Past Surgical History:   Procedure Laterality Date    BREAST LUMPECTOMY      ESOPHAGOGASTRODUODENOSCOPY N/A 10/28/2018    Procedure: ESOPHAGOGASTRODUODENOSCOPY (EGD); Surgeon: Glady Ahumada, MD;  Location:  MAIN OR;  Service: Gastroenterology    HYSTERECTOMY         Family History   Problem Relation Age of Onset    Substance Abuse Father         alcohol    Alcohol abuse Father     Mental illness Neg Hx        Review of Systems   Musculoskeletal: Positive for myalgias  Skin: Negative for color change, rash and wound  Neurological: Negative for weakness and numbness  Objective:    /75   Pulse 93   Temp 99 5 °F (37 5 °C)   Resp 14   Ht 5' 3" (1 6 m)   Wt 83 9 kg (185 lb)   LMP  (LMP Unknown)   SpO2 97%   BMI 32 77 kg/m²     Physical Exam   Constitutional: She is oriented to person, place, and time   Vital signs are normal  She appears well-developed and well-nourished  No distress  HENT:   Head: Normocephalic and atraumatic  Cardiovascular: Intact distal pulses  Pulmonary/Chest: Effort normal    Musculoskeletal: Normal range of motion  Left hand: She exhibits tenderness  She exhibits normal range of motion, no bony tenderness, normal two-point discrimination, normal capillary refill, no deformity, no laceration and no swelling  Normal sensation noted  Normal strength noted  Pain with flexion and extension of left middle finger, radiation of pain to distal palm  Sensation intact, pulses intact, cap refill < 2 sec   Neurological: She is alert and oriented to person, place, and time  Skin: Skin is warm and dry  Capillary refill takes less than 2 seconds  No rash noted  She is not diaphoretic  No erythema  Nursing note and vitals reviewed

## 2019-10-28 ENCOUNTER — TELEPHONE (OUTPATIENT)
Dept: FAMILY MEDICINE CLINIC | Facility: HOSPITAL | Age: 64
End: 2019-10-28

## 2019-10-28 ENCOUNTER — CLINICAL SUPPORT (OUTPATIENT)
Dept: NUTRITION | Facility: HOSPITAL | Age: 64
End: 2019-10-28
Payer: COMMERCIAL

## 2019-10-28 VITALS — BODY MASS INDEX: 33.41 KG/M2 | WEIGHT: 188.6 LBS

## 2019-10-28 DIAGNOSIS — E11.649 UNCONTROLLED TYPE 2 DIABETES MELLITUS WITH HYPOGLYCEMIA WITHOUT COMA (HCC): Primary | ICD-10-CM

## 2019-10-28 DIAGNOSIS — E11.65 TYPE 2 DIABETES MELLITUS WITH HYPERGLYCEMIA, WITHOUT LONG-TERM CURRENT USE OF INSULIN (HCC): Primary | ICD-10-CM

## 2019-10-28 PROCEDURE — 97803 MED NUTRITION INDIV SUBSEQ: CPT

## 2019-10-28 NOTE — PROGRESS NOTES
Follow-Up Nutrition Assessment Form    Patient Name: Aide Mathur    YOB: 1955    Sex: Female      Follow Up Date: 10/28/2019  Start Time: 1 Stop Time: 130 Total Minutes: 30     Data:  Present at session: self   Parent/Patient Concerns: DM   Medical Dx/Reason for Referral: DM   Past Medical History:   Diagnosis Date    Diabetes mellitus ([address] )     prediabetic     Hypertension     Irritable bowel disease     Vertigo        Current Outpatient Medications   Medication Sig Dispense Refill    acetaminophen (TYLENOL) 325 mg tablet Take 650 mg by mouth every 6 (six) hours as needed for mild pain      Ascorbic Acid (VITAMIN C) 100 MG tablet Take 100 mg by mouth daily      b complex vitamins capsule Take 1 capsule by mouth daily      Cholecalciferol (VITAMIN D3) 2000 units TABS 1 daily      glucosamine-chondroitin 500-400 MG tablet Take 1 tablet by mouth 3 (three) times a day      Omega-3 Fatty Acids (FISH OIL) 1,000 mg Take 1,000 mg by mouth daily      omeprazole (PriLOSEC OTC) 20 MG tablet 1 daily prn only      saccharomyces boulardii (FLORASTOR) 250 mg capsule Take 250 mg by mouth 2 (two) times a day      Turmeric 500 MG CAPS Take by mouth 1 daily      vitamin A 7500 UNIT capsule Take 7,500 Units by mouth daily      vitamin E, tocopherol, 1,000 units capsule Take 1,000 Units by mouth daily       No current facility-administered medications for this visit           Additional Meds/Supplements: n/a   Barriers to Learning: None   Labs: n/a   Height: Ht Readings from Last 3 Encounters:   09/28/19 5' 3" (1 6 m)   09/12/19 5' 3" (1 6 m)   09/10/19 5' 3" (1 6 m)      Weight: Wt Readings from Last 10 Encounters:   10/28/19 85 5 kg (188 lb 9 6 oz)   09/28/19 83 9 kg (185 lb)   09/23/19 84 9 kg (187 lb 3 2 oz)   09/12/19 83 9 kg (185 lb)   09/10/19 83 9 kg (185 lb)   08/27/19 81 6 kg (180 lb)   08/13/19 86 5 kg (190 lb 12 8 oz)   11/05/18 82 6 kg (182 lb)   10/29/18 83 7 kg (184 lb 8 4 oz)   10/27/18 83 5 kg (184 lb)     Estimated body mass index is 33 41 kg/m² as calculated from the following:    Height as of 9/28/19: 5' 3" (1 6 m)  Weight as of this encounter: 85 5 kg (188 lb 9 6 oz)  Wt  Change Since Last Visit: []Yes     [x]No  Amount:       Energy Needs: No calculations performed for this visit   Pain Screen: Are you having pain now? No      Goals Achieved:     New Goals:   1  Continue activity 3-4x/wk x 20-30 mins   2  16oz water with every meal   3  Initial PES:    Altered Nutrition-Related Laboratory values  related to Kidney, liver, cardiac, endocrine, neurologic, and/or pulmonary dysfunction as evidenced by  Abnormal plasma glucose and/or HgbA1c levels   New PES: No Change      New Problem List:  1 none new   2    3        Assessment:  Still limping from injured toe, thinks she is about as good as she is going to get now  Rheumatologist looking into possible lupus  Feels she has been cheating more with candy, parties etc, but tryingf to do more cooking at home, eating B earlier and lunch later and snack in between L and D  (1/2 fruit or PB and apple), will eat dinner at [employer] (where she works) usually gets salad but will sometimes get a grilled chicken s/w  Snack at 9 and bed 10-11  Has been trying to increase water intake, herbal teas as well       Medical Nutrition Therapy Intervention:  [x]Individualized Meal Plan-eating halloween candy last week or so; eating 30-60 gms carbs per meal, eating more out of boredom than hunger; eating more lean protein; continue food log []Understanding Lab Values   []Basic Pathophysiology of Disease []Food/Medication Interactions   []Food Diary [x]Exercise- biking up to 40 mins   [x]Lifestyle/Behavior Modification Techniques- sleeping better, getting more regular activity, eating more regular meals; food journaling helping her when she has highs or lows with BG []Medication, Mechanism of Action   []Label Reading [x]Self Blood Glucose Monitoring-lost glucometer so not checking Bg at home; but has been dropping with biking 120-140's, but has not had any lows     [x]Weight/BMI Goals-fairly stable []Other -    Other Notes:  Has goiter, also going to check her thyroid        Comprehension: []Excellent  [x]Very Good  []Good  []Fair   []Poor    Receptivity: []Excellent  [x]Very Good  []Good  []Fair   []Poor    Expected Compliance: []Excellent  [x]Very Good  []Good  []Fair   []Poor      Labs:  CMP  Lab Results   Component Value Date    K 4 1 08/14/2019     08/14/2019    CO2 23 08/14/2019    BUN 12 08/14/2019    CREATININE 0 56 (L) 08/14/2019    GLUF 118 (H) 11/19/2018    CALCIUM 8 9 11/19/2018    AST 16 08/14/2019    ALT 21 08/14/2019    ALKPHOS 79 11/19/2018    EGFR 98 11/19/2018       BMP  Lab Results   Component Value Date    CALCIUM 8 9 11/19/2018    K 4 1 08/14/2019    CO2 23 08/14/2019     08/14/2019    BUN 12 08/14/2019    CREATININE 0 56 (L) 08/14/2019       Lipids  No results found for: CHOL  No results found for: HDL  No results found for: LDLCALC  No results found for: TRIG  No results found for: CHOLHDL    Hemoglobin A1C  Lab Results   Component Value Date    HGBA1C 9 5 (H) 08/14/2019       Fasting Glucose  Lab Results   Component Value Date    GLUF 118 (H) 11/19/2018       Insulin     Thyroid  No results found for: TSH, Z0ILRDS, Y9AEDYW, THYROIDAB    Hepatic Function Panel  Lab Results   Component Value Date    ALT 21 08/14/2019    AST 16 08/14/2019    ALKPHOS 79 11/19/2018       Celiac Disease Antibody Panel  No results found for: ENDOMYSIAL IGA, GLIADIN IGA, GLIADIN IGG, IGA, TISSUE TRANSGLUT AB, TTG IGA   Iron  Lab Results   Component Value Date    IRON 90 10/28/2018    TIBC 272 10/28/2018    FERRITIN 53 10/28/2018       Vitamins  No results found for: VITAMIN B2   No results found for: NICOTINAMIDE, NICOTINIC ACID   No results found for: VITAMINB6  No results found for: ZXURLOLH34  No results found for: VITB5  No results found for: N9DZIGLG  No results found for: THYROGLB  No results found for: VITAMIN K   25-HYDROXY VIT D   Date Value Ref Range Status   08/14/2019 24 3 (L) 30 0 - 100 0 ng/mL Final     Comment:     Vitamin D deficiency has been defined by the [address] practice guideline as a  level of serum 25-OH vitamin D less than 20 ng/mL (1,2)  The Endocrine Society went on to further define vitamin D  insufficiency as a level between 21 and 29 ng/mL (2)  1  IOM ([employer] [employer])  2010  Dietary reference     intakes for calcium and D  [facility]: The     [employer]ized [employer]  2  Jack MF, Alistair NC, Radha NUNEZ, et al      Evaluation, treatment, and prevention of vitamin D     deficiency: an Endocrine Society clinical practice     guideline  JCEM  2011 Jul; 96(7):1911-30  No components found for: VITAMINE     No follow-ups on file      [address] [id number]

## 2019-10-31 RX ORDER — BLOOD-GLUCOSE METER
EACH MISCELLANEOUS
Qty: 1 EACH | Refills: 0 | Status: SHIPPED | OUTPATIENT
Start: 2019-10-31 | End: 2020-01-29 | Stop reason: SDUPTHER

## 2019-10-31 RX ORDER — LANCETS
EACH MISCELLANEOUS
Qty: 100 EACH | Refills: 5 | Status: SHIPPED | OUTPATIENT
Start: 2019-10-31 | End: 2020-01-29 | Stop reason: SDUPTHER

## 2019-11-12 ENCOUNTER — OFFICE VISIT (OUTPATIENT)
Dept: FAMILY MEDICINE CLINIC | Facility: HOSPITAL | Age: 64
End: 2019-11-12
Payer: COMMERCIAL

## 2019-11-12 VITALS
RESPIRATION RATE: 20 BRPM | OXYGEN SATURATION: 98 % | WEIGHT: 219.6 LBS | TEMPERATURE: 98.5 F | SYSTOLIC BLOOD PRESSURE: 160 MMHG | HEART RATE: 83 BPM | HEIGHT: 61 IN | DIASTOLIC BLOOD PRESSURE: 80 MMHG | BODY MASS INDEX: 41.46 KG/M2

## 2019-11-12 DIAGNOSIS — Z12.12 ENCOUNTER FOR SCREENING FOR MALIGNANT NEOPLASM OF RECTUM: ICD-10-CM

## 2019-11-12 DIAGNOSIS — I34.1 MVP (MITRAL VALVE PROLAPSE): ICD-10-CM

## 2019-11-12 DIAGNOSIS — Z12.11 ENCOUNTER FOR SCREENING FOR MALIGNANT NEOPLASM OF COLON: ICD-10-CM

## 2019-11-12 DIAGNOSIS — K86.89 DILATION OF PANCREATIC DUCT: ICD-10-CM

## 2019-11-12 DIAGNOSIS — M20.42 HAMMER TOES OF BOTH FEET: ICD-10-CM

## 2019-11-12 DIAGNOSIS — K76.0 HEPATIC STEATOSIS: ICD-10-CM

## 2019-11-12 DIAGNOSIS — M20.41 HAMMER TOES OF BOTH FEET: ICD-10-CM

## 2019-11-12 DIAGNOSIS — K21.9 GASTROESOPHAGEAL REFLUX DISEASE, ESOPHAGITIS PRESENCE NOT SPECIFIED: Chronic | ICD-10-CM

## 2019-11-12 DIAGNOSIS — C43.4 MALIGNANT MELANOMA OF NECK (HCC): ICD-10-CM

## 2019-11-12 DIAGNOSIS — E11.65 TYPE 2 DIABETES MELLITUS WITH HYPERGLYCEMIA, WITHOUT LONG-TERM CURRENT USE OF INSULIN (HCC): ICD-10-CM

## 2019-11-12 DIAGNOSIS — Z23 NEED FOR SHINGLES VACCINE: ICD-10-CM

## 2019-11-12 DIAGNOSIS — Z12.39 SCREENING FOR BREAST CANCER: Primary | ICD-10-CM

## 2019-11-12 DIAGNOSIS — I10 ESSENTIAL HYPERTENSION: Chronic | ICD-10-CM

## 2019-11-12 DIAGNOSIS — E04.2 MULTIPLE THYROID NODULES: ICD-10-CM

## 2019-11-12 DIAGNOSIS — Z11.59 NEED FOR HEPATITIS C SCREENING TEST: ICD-10-CM

## 2019-11-12 PROBLEM — E66.01 MORBID OBESITY (HCC): Status: ACTIVE | Noted: 2019-11-12

## 2019-11-12 PROBLEM — K86.2 PANCREATIC CYST: Status: ACTIVE | Noted: 2019-11-12

## 2019-11-12 PROCEDURE — 1036F TOBACCO NON-USER: CPT | Performed by: INTERNAL MEDICINE

## 2019-11-12 PROCEDURE — 99214 OFFICE O/P EST MOD 30 MIN: CPT | Performed by: INTERNAL MEDICINE

## 2019-11-12 NOTE — PATIENT INSTRUCTIONS
Do labs early next month  Call to schedule mri in  [facility] December  See about participating dermatologist  call for echo  Call to see gi- after mri- Dr Castillo Hind

## 2019-11-12 NOTE — ASSESSMENT & PLAN NOTE
Problem: Goal Outcome Summary  Goal: Goal Outcome Summary  Outcome: Improving  Infant vss, meeting expected goals, is bonding well with mother, has been breast fed w/ nipple shield, infant is voiding and stooling appropriately for age.  Education done, see flow sheet.       Will order mri in followup

## 2019-11-12 NOTE — ASSESSMENT & PLAN NOTE
Lab Results   Component Value Date    HGBA1C 9 5 (H) 08/14/2019     Has improved readings with limiting carbs- was 195 fasting- met with dietician- now fasting is lower in 120- 130   Pm yesterday was 159- then did exercise bike- down to 152

## 2019-11-12 NOTE — PROGRESS NOTES
Assessment/Plan:             Problem List Items Addressed This Visit        Digestive    GERD (gastroesophageal reflux disease) (Chronic)     On omeprazole            Endocrine    Type 2 diabetes mellitus with hyperglycemia, without long-term current use of insulin ([facility] [address] )       Lab Results   Component Value Date    HGBA1C 9 5 (H) 08/14/2019     Has improved readings with limiting carbs- was 195 fasting- met with dietician- now fasting is lower in 120- 130  Pm yesterday was 159- then did exercise bike- down to 152            Cardiovascular and Mediastinum    Hypertension (Chronic)     Elevated readings today- had hectic time getting in here for evaluation            Musculoskeletal and Integument    Hammer toes of both feet     Trying to do weight loss- used to be a swimmer- possibility of doing swim prgoram           Other Visit Diagnoses     Screening for breast cancer    -  Primary    Relevant Orders    Mammo screening bilateral w cad    Encounter for screening for malignant neoplasm of rectum        Relevant Orders    Cologuard    Encounter for screening for malignant neoplasm of colon        Relevant Orders    Cologuard            Subjective:      Patient ID: Sujey Donato is a 62 y o  female    1  Dm- will repeat hba1c- improving intake  2  Foot pain- seen by podiatry and rheumatology felt to be osteoarthritis - had positive jatinder- negatrive workup for sle or rheumatoid arthritis  3  Abdominal issues- - has gerd- needs to see GI for evaluation- had prior abnormal  Mri of liver- her insurance turned down ct  4  melenoma- hx on left neck- removed when she was  In the  [facility] clinic- will refer to dermatology      The following portions of the patient's history were reviewed and updated as appropriate: allergies, current medications and problem list      Review of Systems   Constitutional: Negative for fatigue  Gastrointestinal: Negative for abdominal pain and constipation     All other systems reviewed and are negative  Objective:      Current Outpatient Medications:     acetaminophen (TYLENOL) 325 mg tablet, Take 650 mg by mouth every 6 (six) hours as needed for mild pain, Disp: , Rfl:     Ascorbic Acid (VITAMIN C) 100 MG tablet, Take 100 mg by mouth daily, Disp: , Rfl:     b complex vitamins capsule, Take 1 capsule by mouth daily, Disp: , Rfl:     Blood Glucose Monitoring Suppl (ONE TOUCH ULTRA 2) w/Device KIT, Testing 4 times daily, Disp: 1 each, Rfl: 0    Cholecalciferol (VITAMIN D3) 2000 units TABS, 1 daily, Disp: , Rfl:     glucosamine-chondroitin 500-400 MG tablet, Take 1 tablet by mouth 3 (three) times a day, Disp: , Rfl:     glucose blood (ONE TOUCH ULTRA TEST) test strip, Testing 4 times daily, Disp: 100 each, Rfl: 5    Lancets (ONETOUCH ULTRASOFT) lancets, Testing 4 times daily, Disp: 100 each, Rfl: 5    Omega-3 Fatty Acids (FISH OIL) 1,000 mg, Take 1,000 mg by mouth daily, Disp: , Rfl:     omeprazole (PriLOSEC OTC) 20 MG tablet, 1 daily prn only, Disp: , Rfl:     saccharomyces boulardii (FLORASTOR) 250 mg capsule, Take 250 mg by mouth 2 (two) times a day, Disp: , Rfl:     Turmeric 500 MG CAPS, Take by mouth 1 daily, Disp: , Rfl:     vitamin A 7500 UNIT capsule, Take 7,500 Units by mouth daily, Disp: , Rfl:     vitamin E, tocopherol, 1,000 units capsule, Take 1,000 Units by mouth daily, Disp: , Rfl:     Blood pressure 160/80, pulse 83, temperature 98 5 °F (36 9 °C), temperature source Tympanic, resp  rate 20, height 5' 1 42" (1 56 m), weight 99 6 kg (219 lb 9 6 oz), SpO2 98 %, not currently breastfeeding  Physical Exam   Constitutional: She is oriented to person, place, and time  She appears well-developed and well-nourished  No distress  HENT:   Head: Normocephalic  Mild nasal congestion   Eyes: Conjunctivae are normal  Right eye exhibits no discharge  Left eye exhibits no discharge  Cardiovascular: Normal rate and regular rhythm  Exam reveals no friction rub     No murmur heard   Pulmonary/Chest: Effort normal and breath sounds normal  No stridor  No respiratory distress  Abdominal: Soft  Bowel sounds are normal  She exhibits no distension  There is no tenderness  There is no guarding  Musculoskeletal: She exhibits no edema or deformity  Lymphadenopathy:     She has no cervical adenopathy  Neurological: She is alert and oriented to person, place, and time  No cranial nerve deficit  Skin: No erythema  No pallor  Nursing note and vitals reviewed  BMI Counseling: Body mass index is 40 93 kg/m²  The BMI is above normal  Exercise recommendations include exercising 3-5 times per week

## 2019-12-03 ENCOUNTER — OFFICE VISIT (OUTPATIENT)
Dept: PODIATRY | Facility: CLINIC | Age: 64
End: 2019-12-03
Payer: COMMERCIAL

## 2019-12-03 VITALS
WEIGHT: 220 LBS | SYSTOLIC BLOOD PRESSURE: 155 MMHG | HEIGHT: 61 IN | DIASTOLIC BLOOD PRESSURE: 74 MMHG | BODY MASS INDEX: 41.54 KG/M2

## 2019-12-03 DIAGNOSIS — E11.65 TYPE 2 DIABETES MELLITUS WITH HYPERGLYCEMIA, WITHOUT LONG-TERM CURRENT USE OF INSULIN (HCC): Primary | ICD-10-CM

## 2019-12-03 DIAGNOSIS — M19.071 OSTEOARTHRITIS OF RIGHT ANKLE AND FOOT: ICD-10-CM

## 2019-12-03 PROCEDURE — 99213 OFFICE O/P EST LOW 20 MIN: CPT | Performed by: PODIATRIST

## 2019-12-03 NOTE — PROGRESS NOTES
Assessment/Plan:     Diagnoses and all orders for this visit:    Type 2 diabetes mellitus with hyperglycemia, without long-term current use of insulin (HCC)    Osteoarthritis of right ankle and foot     Patient is following up for her initial visit  Her blood sugar is gotten much better controlled and she is due to have her A1c repeated  She feels that would be much lower  Overall her diabetic foot risk is low if she has good pedal pulses and normal sensation to her feet  From a diabetic risk I would recommend once year examinations     Regarding the patient's arch and heel pain, it feels much better with the off-the-shelf arch supports she purchased in the office last month  She feels as long she wears these and does her daily stretches the discomfort in her foot is something she can live with    Regarding the arthritis in her foot we again reviewed her x-ray showing a spurring of the joints on the lateral image at the mid tarsal area  We discussed conservative measures such is softer shoes, and keeping issues tied looser, skip lacing the shoes  This should again be something she can live with conservatively  She will follow up in 1 year unless new concerns arise  Subjective:      Patient ID: Sujey Donato is a 62 y o  female  PAtients arches feel much better with supports  She kicked a piece of furniture in September and broke her toe but it feels much better Her BG has been getting lower, she is due to have her A1C rechecked  She odes stae the arthritis in her foot comes and goes  She has no numbness in her feet  The following portions of the patient's history were reviewed and updated as appropriate: allergies, current medications, past family history, past medical history, past social history, past surgical history and problem list     Review of Systems   Constitutional: Negative  Musculoskeletal: Positive for arthralgias  Skin: Negative for color change and wound     Neurological: Negative for numbness  Objective:      /74   Ht 5' 1" (1 549 m)   Wt 99 8 kg (220 lb)   LMP  (LMP Unknown)   BMI 41 57 kg/m²          Physical Exam    Vitals reviewed    Constitutional: Patient is not distressed  Patient is well developed    Vascular: Dorsalis pedis and posterior tibial pulses 2/4  Capillary refill time within normal limits to all digits  No erythema  No edema  Dermatology: No rash, no open lesions  Present pedal hair  Musculoskeletal: Normal range of motion to ankle, subtalar joint, and midtarsal joint  Normal range of motion first MTPJ  Manual muscle testing 5 out of 5 for inversion/eversion/dorsiflexion/plantarflexion  Palpable bony spurring at the mid tarsal and tarsometatarsal joints of the right foot with mild tenderness  Neurological exam: Monofilament sensation intact  Vibratory sensation intact   Achilles reflex is normal   ucated on A1C an

## 2019-12-13 ENCOUNTER — CLINICAL SUPPORT (OUTPATIENT)
Dept: NUTRITION | Facility: HOSPITAL | Age: 64
End: 2019-12-13
Payer: COMMERCIAL

## 2019-12-13 VITALS — WEIGHT: 188.2 LBS | BODY MASS INDEX: 35.56 KG/M2

## 2019-12-13 DIAGNOSIS — E11.649 UNCONTROLLED TYPE 2 DIABETES MELLITUS WITH HYPOGLYCEMIA WITHOUT COMA (HCC): ICD-10-CM

## 2019-12-13 PROCEDURE — 97803 MED NUTRITION INDIV SUBSEQ: CPT

## 2019-12-13 NOTE — PROGRESS NOTES
Follow-Up Nutrition Assessment Form    Patient Name: Gardenia Millan    YOB: 1955    Sex: Female      Follow Up Date: 12/13/2019  Start Time: 1 Stop Time: 130 Total Minutes: 30     Data:  Present at session: self   Parent/Patient Concerns: DM   Medical Dx/Reason for Referral: DM   Past Medical History:   Diagnosis Date    Diabetes mellitus ([address] )     prediabetic     Hypertension     Irritable bowel disease     Vertigo        Current Outpatient Medications   Medication Sig Dispense Refill    acetaminophen (TYLENOL) 325 mg tablet Take 650 mg by mouth every 6 (six) hours as needed for mild pain      Ascorbic Acid (VITAMIN C) 100 MG tablet Take 100 mg by mouth daily      b complex vitamins capsule Take 1 capsule by mouth daily      Blood Glucose Monitoring Suppl (ONE TOUCH ULTRA 2) w/Device KIT Testing 4 times daily 1 each 0    Cholecalciferol (VITAMIN D3) 2000 units TABS 1 daily      glucosamine-chondroitin 500-400 MG tablet Take 1 tablet by mouth 3 (three) times a day      glucose blood (ONE TOUCH ULTRA TEST) test strip Testing 4 times daily 100 each 5    Lancets (ONETOUCH ULTRASOFT) lancets Testing 4 times daily 100 each 5    Omega-3 Fatty Acids (FISH OIL) 1,000 mg Take 1,000 mg by mouth daily      omeprazole (PriLOSEC OTC) 20 MG tablet 1 daily prn only      saccharomyces boulardii (FLORASTOR) 250 mg capsule Take 250 mg by mouth 2 (two) times a day      Turmeric 500 MG CAPS Take by mouth 1 daily      vitamin A 7500 UNIT capsule Take 7,500 Units by mouth daily      vitamin E, tocopherol, 1,000 units capsule Take 1,000 Units by mouth daily      Zoster Vac Recomb Adjuvanted (SHINGRIX) 50 MCG/0 5ML SUSR Inject 2 Doses into a muscle see administration instructions 2 doses 2- 6 months apart 2 each 0     No current facility-administered medications for this visit           Additional Meds/Supplements: None new   Barriers to Learning: None   Labs: None new- going Monday for bloodwork   Height: Ht Readings from Last 3 Encounters:   12/03/19 5' 1" (1 549 m)   11/12/19 5' 1 42" (1 56 m)   09/28/19 5' 3" (1 6 m)      Weight: Wt Readings from Last 10 Encounters:   12/13/19 85 4 kg (188 lb 3 2 oz)   12/03/19 99 8 kg (220 lb)   11/12/19 99 6 kg (219 lb 9 6 oz)   10/28/19 85 5 kg (188 lb 9 6 oz)   09/28/19 83 9 kg (185 lb)   09/23/19 84 9 kg (187 lb 3 2 oz)   09/12/19 83 9 kg (185 lb)   09/10/19 83 9 kg (185 lb)   08/27/19 81 6 kg (180 lb)   08/13/19 86 5 kg (190 lb 12 8 oz)     Estimated body mass index is 35 56 kg/m² as calculated from the following:    Height as of 12/3/19: 5' 1" (1 549 m)  Weight as of this encounter: 85 4 kg (188 lb 3 2 oz)  Wt  Change Since Last Visit: previous visit on October 28th []Yes     [x]No  Amount:       Energy Needs: No calculations performed for this visit   Pain Screen: Are you having pain now? No      Goals Achieved: regular meals and snacks, minimal calories from drinks, and stays active regularly, is keeping food log, counting carbs     New Goals:   1    2    3        Initial PES:    Altered nutrition related labs r/t DM AEB high BG  New PES: No Change      New Problem List:  1 none new   2    3        Assessment:  SBGM 115-130, pleased with lower BG readings, feels much better, highest us about 150, not 400's as previously  Has cut out butter, and skim milk, many changes, increased water intake  Increase in salad and veggies  Has drastically decreased sweet and sugar intake  Is doing her exercise, hike/bike, yoga and weights  Trying to drastically decrease sweet intake, has been avoiding sodas and drinks water, herbal teas, decaf coffee in the morning as well  Snacking as needed  Loves to exercise       Medical Nutrition Therapy Intervention:  [x]Individualized Meal Plan- eating rola toast for snacks, measuring everything, 2 TBSP PM, oatmeal in the morning, using kodiak pancakes/waffles, green tea, cottage cheese and pickles []Understanding Lab Values   []Basic Pathophysiology of Disease []Food/Medication Interactions   [x]Food Diary [x]Exercise- states has always been active; biking for 30 mins typically, tries to do the HIIT   [x]Lifestyle/Behavior Modification Techniques []Medication, Mechanism of Action   []Label Reading [x]Self Blood Glucose Monitoring- 205 this morning   [x]Weight/BMI Goals [x]Other -  and mom had DM   Other Notes:        Comprehension: []Excellent  [x]Very Good  []Good  []Fair   []Poor    Receptivity: []Excellent  [x]Very Good  []Good  []Fair   []Poor    Expected Compliance: []Excellent  [x]Very Good  []Good  []Fair   []Poor      Labs:  CMP  Lab Results   Component Value Date    K 4 1 08/14/2019     08/14/2019    CO2 23 08/14/2019    BUN 12 08/14/2019    CREATININE 0 56 (L) 08/14/2019    GLUF 118 (H) 11/19/2018    CALCIUM 8 9 11/19/2018    AST 16 08/14/2019    ALT 21 08/14/2019    ALKPHOS 79 11/19/2018    EGFR 98 11/19/2018       BMP  Lab Results   Component Value Date    CALCIUM 8 9 11/19/2018    K 4 1 08/14/2019    CO2 23 08/14/2019     08/14/2019    BUN 12 08/14/2019    CREATININE 0 56 (L) 08/14/2019       Lipids  No results found for: CHOL  No results found for: HDL  No results found for: LDLCALC  No results found for: TRIG  No results found for: CHOLHDL    Hemoglobin A1C  Lab Results   Component Value Date    HGBA1C 9 5 (H) 08/14/2019       Fasting Glucose  Lab Results   Component Value Date    GLUF 118 (H) 11/19/2018       Insulin     Thyroid  No results found for: TSH, T1JINML, Q6CCPHN, THYROIDAB    Hepatic Function Panel  Lab Results   Component Value Date    ALT 21 08/14/2019    AST 16 08/14/2019    ALKPHOS 79 11/19/2018       Celiac Disease Antibody Panel  No results found for: ENDOMYSIAL IGA, GLIADIN IGA, GLIADIN IGG, IGA, TISSUE TRANSGLUT AB, TTG IGA   Iron  Lab Results   Component Value Date    IRON 90 10/28/2018    TIBC 272 10/28/2018    FERRITIN 53 10/28/2018       Vitamins  No results found for: VITAMIN B2   No results found for: NICOTINAMIDE, NICOTINIC ACID   No results found for: VITAMINB6  No results found for: POAAPNAE65  No results found for: VITB5  No results found for: B1HWWTIV  No results found for: THYROGLB  No results found for: VITAMIN K   25-HYDROXY VIT D   Date Value Ref Range Status   08/14/2019 24 3 (L) 30 0 - 100 0 ng/mL Final     Comment:     Vitamin D deficiency has been defined by the [place] of  [employer] and an Endocrine Society practice guideline as a  level of serum 25-OH vitamin D less than 20 ng/mL (1,2)  The Endocrine Society went on to further define vitamin D  insufficiency as a level between 21 and 29 ng/mL (2)  1  IOM ([place] [employer])  2010  Dietary reference     intakes for calcium and D  [facility]: The     [employer]  2  Jack MF, Alistair BETANCUR, Radha NUNEZ, et al      Evaluation, treatment, and prevention of vitamin D     deficiency: an Endocrine Society clinical practice     guideline  JCEM  2011 Jul; 96(7):1911-30  No components found for: VITAMINE     No follow-ups on file      [address] [address]

## 2019-12-17 LAB — HBA1C MFR BLD HPLC: 8.2 %

## 2019-12-18 LAB
ALBUMIN SERPL-MCNC: 4.3 G/DL (ref 3.6–4.8)
ALBUMIN/GLOB SERPL: 2 {RATIO} (ref 1.2–2.2)
ALP SERPL-CCNC: 76 IU/L (ref 39–117)
ALT SERPL-CCNC: 19 IU/L (ref 0–32)
AST SERPL-CCNC: 12 IU/L (ref 0–40)
BASOPHILS # BLD AUTO: 0 X10E3/UL (ref 0–0.2)
BASOPHILS NFR BLD AUTO: 0 %
BILIRUB SERPL-MCNC: 0.4 MG/DL (ref 0–1.2)
BUN SERPL-MCNC: 18 MG/DL (ref 8–27)
BUN/CREAT SERPL: 31 (ref 12–28)
CALCIUM SERPL-MCNC: 9.4 MG/DL (ref 8.7–10.3)
CHLORIDE SERPL-SCNC: 103 MMOL/L (ref 96–106)
CO2 SERPL-SCNC: 25 MMOL/L (ref 20–29)
CREAT SERPL-MCNC: 0.58 MG/DL (ref 0.57–1)
EOSINOPHIL # BLD AUTO: 0.2 X10E3/UL (ref 0–0.4)
EOSINOPHIL NFR BLD AUTO: 2 %
ERYTHROCYTE [DISTWIDTH] IN BLOOD BY AUTOMATED COUNT: 13.8 % (ref 12.3–15.4)
EST. AVERAGE GLUCOSE BLD GHB EST-MCNC: 189 MG/DL
GLOBULIN SER-MCNC: 2.1 G/DL (ref 1.5–4.5)
GLUCOSE SERPL-MCNC: 126 MG/DL (ref 65–99)
HBA1C MFR BLD: 8.2 % (ref 4.8–5.6)
HCT VFR BLD AUTO: 43.5 % (ref 34–46.6)
HCV AB S/CO SERPL IA: <0.1 S/CO RATIO (ref 0–0.9)
HGB BLD-MCNC: 14.8 G/DL (ref 11.1–15.9)
IMM GRANULOCYTES # BLD: 0 X10E3/UL (ref 0–0.1)
IMM GRANULOCYTES NFR BLD: 0 %
LYMPHOCYTES # BLD AUTO: 3 X10E3/UL (ref 0.7–3.1)
LYMPHOCYTES NFR BLD AUTO: 38 %
MCH RBC QN AUTO: 30.3 PG (ref 26.6–33)
MCHC RBC AUTO-ENTMCNC: 34 G/DL (ref 31.5–35.7)
MCV RBC AUTO: 89 FL (ref 79–97)
MONOCYTES # BLD AUTO: 0.5 X10E3/UL (ref 0.1–0.9)
MONOCYTES NFR BLD AUTO: 7 %
NEUTROPHILS # BLD AUTO: 4.1 X10E3/UL (ref 1.4–7)
NEUTROPHILS NFR BLD AUTO: 53 %
PLATELET # BLD AUTO: 230 X10E3/UL (ref 150–450)
POTASSIUM SERPL-SCNC: 3.8 MMOL/L (ref 3.5–5.2)
PROT SERPL-MCNC: 6.4 G/DL (ref 6–8.5)
RBC # BLD AUTO: 4.89 X10E6/UL (ref 3.77–5.28)
SL AMB EGFR AFRICAN AMERICAN: 113 ML/MIN/1.73
SL AMB EGFR NON AFRICAN AMERICAN: 98 ML/MIN/1.73
SODIUM SERPL-SCNC: 142 MMOL/L (ref 134–144)
WBC # BLD AUTO: 7.7 X10E3/UL (ref 3.4–10.8)

## 2019-12-20 DIAGNOSIS — E11.65 TYPE 2 DIABETES MELLITUS WITH HYPERGLYCEMIA, WITHOUT LONG-TERM CURRENT USE OF INSULIN (HCC): Primary | ICD-10-CM

## 2019-12-23 ENCOUNTER — TELEPHONE (OUTPATIENT)
Dept: FAMILY MEDICINE CLINIC | Facility: HOSPITAL | Age: 64
End: 2019-12-23

## 2019-12-23 NOTE — TELEPHONE ENCOUNTER
----- Message from Alycia Viera DO sent at 12/20/2019  6:36 PM EST -----  Hep c antibody negative  Cbc is normal   elevated glucose at 126- was 151  4 months ago  hba1c still high at 8 2%   no conc sweets diet   start on metfromin 500 bis- call sugar readings in 3 weeks

## 2019-12-27 ENCOUNTER — TELEPHONE (OUTPATIENT)
Dept: FAMILY MEDICINE CLINIC | Facility: HOSPITAL | Age: 64
End: 2019-12-27

## 2019-12-27 DIAGNOSIS — E11.65 TYPE 2 DIABETES MELLITUS WITH HYPERGLYCEMIA, WITHOUT LONG-TERM CURRENT USE OF INSULIN (HCC): Primary | ICD-10-CM

## 2019-12-27 NOTE — TELEPHONE ENCOUNTER
Pt aware  New message sent to Dr Rolf Doherty, pt is working w/a dietician and does not wish to start Metformin at this time  Will send in BS readings as requested   CR

## 2019-12-31 ENCOUNTER — HOSPITAL ENCOUNTER (OUTPATIENT)
Dept: NON INVASIVE DIAGNOSTICS | Age: 64
Discharge: HOME | End: 2019-12-31
Payer: COMMERCIAL

## 2019-12-31 DIAGNOSIS — I34.1 MVP (MITRAL VALVE PROLAPSE): ICD-10-CM

## 2019-12-31 PROCEDURE — 93306 TTE W/DOPPLER COMPLETE: CPT | Performed by: INTERNAL MEDICINE

## 2019-12-31 PROCEDURE — 93306 TTE W/DOPPLER COMPLETE: CPT

## 2020-01-13 ENCOUNTER — HOSPITAL ENCOUNTER (OUTPATIENT)
Dept: BONE DENSITY | Facility: IMAGING CENTER | Age: 65
Discharge: HOME | End: 2020-01-13
Payer: COMMERCIAL

## 2020-01-13 VITALS — HEIGHT: 62 IN | BODY MASS INDEX: 33.68 KG/M2 | WEIGHT: 183 LBS

## 2020-01-13 DIAGNOSIS — Z12.39 SCREENING FOR BREAST CANCER: ICD-10-CM

## 2020-01-13 PROCEDURE — 77067 SCR MAMMO BI INCL CAD: CPT

## 2020-01-13 NOTE — LETTER
[address] [facility], [place], operated by [facility] [address]      January 7, 2021    MRN: [id number]     Phone:[phone]     Dear Ms Smith,    Based on your screening mammogram performed on January 13, 2020, it is time to schedule your next routine screening mammogram     Please contact your physician's office to obtain a prescription or referral for this exam     Screening mammography for early detection of cancer is important for your ongoing health  If you feel a lump or have any other reasons for concern, you should tell your health care provider  Early detection requires a combination of monthly breast self-awareness, yearly clinical breast examinations and periodic mammography according to your age, risk and physician recommendations  [facility]  Scheduling department can assist you in making your next appointment by calling [phone]  Thank you for choosing [address] for your imaging needs      Sincerely,    [address] [facility]

## 2020-01-14 ENCOUNTER — CLINICAL SUPPORT (OUTPATIENT)
Dept: NUTRITION | Facility: HOSPITAL | Age: 65
End: 2020-01-14
Payer: COMMERCIAL

## 2020-01-14 VITALS — BODY MASS INDEX: 33.73 KG/M2 | WEIGHT: 184.4 LBS

## 2020-01-14 DIAGNOSIS — E11.00 TYPE II DIABETES MELLITUS WITH HYPEROSMOLARITY, UNCONTROLLED (HCC): Primary | ICD-10-CM

## 2020-01-14 DIAGNOSIS — E11.65 TYPE II DIABETES MELLITUS WITH HYPEROSMOLARITY, UNCONTROLLED (HCC): Primary | ICD-10-CM

## 2020-01-14 PROCEDURE — 97803 MED NUTRITION INDIV SUBSEQ: CPT

## 2020-01-14 NOTE — PROGRESS NOTES
Follow-Up Nutrition Assessment Form    Patient Name: Mary Flores    YOB: 1955    Sex: Female      Follow Up Date: 1/14/2020  Start Time: 0 Stop Time: 3 Total Minutes:30     Data:  Present at session: self   Parent/Patient Concerns: overweight   Medical Dx/Reason for Referral: overweight   Past Medical History:   Diagnosis Date    Diabetes mellitus ([facility] [address] )     prediabetic     Hypertension     Irritable bowel disease     Melanoma ([facility] [address] )     Vertigo        Current Outpatient Medications   Medication Sig Dispense Refill    acetaminophen (TYLENOL) 325 mg tablet Take 650 mg by mouth every 6 (six) hours as needed for mild pain      Ascorbic Acid (VITAMIN C) 100 MG tablet Take 100 mg by mouth daily      b complex vitamins capsule Take 1 capsule by mouth daily      Blood Glucose Monitoring Suppl (ONE TOUCH ULTRA 2) w/Device KIT Testing 4 times daily 1 each 0    Cholecalciferol (VITAMIN D3) 2000 units TABS 1 daily      glucosamine-chondroitin 500-400 MG tablet Take 1 tablet by mouth 3 (three) times a day      glucose blood (ONE TOUCH ULTRA TEST) test strip Testing 4 times daily 100 each 5    Lancets (ONETOUCH ULTRASOFT) lancets Testing 4 times daily 100 each 5    metFORMIN (GLUCOPHAGE) 500 mg tablet Take 1 tablet (500 mg total) by mouth 2 (two) times a day with meals 60 tablet 0    Omega-3 Fatty Acids (FISH OIL) 1,000 mg Take 1,000 mg by mouth daily      omeprazole (PriLOSEC OTC) 20 MG tablet 1 daily prn only      saccharomyces boulardii (FLORASTOR) 250 mg capsule Take 250 mg by mouth 2 (two) times a day      Turmeric 500 MG CAPS Take by mouth 1 daily      vitamin A 7500 UNIT capsule Take 7,500 Units by mouth daily      vitamin E, tocopherol, 1,000 units capsule Take 1,000 Units by mouth daily      Zoster Vac Recomb Adjuvanted (SHINGRIX) 50 MCG/0 5ML SUSR Inject 2 Doses into a muscle see administration instructions 2 doses 2- 6 months apart 2 each 0     No current facility-administered medications for this visit  Additional Meds/Supplements: n/a   Barriers to Learning: None   Labs: n/a   Height: Ht Readings from Last 3 Encounters:   01/13/20 5' 2" (1 575 m)   12/03/19 5' 1" (1 549 m)   11/12/19 5' 1 42" (1 56 m)      Weight: Wt Readings from Last 10 Encounters:   01/14/20 83 6 kg (184 lb 6 4 oz)   01/13/20 83 kg (183 lb)   12/13/19 85 4 kg (188 lb 3 2 oz)   12/03/19 99 8 kg (220 lb)   11/12/19 99 6 kg (219 lb 9 6 oz)   10/28/19 85 5 kg (188 lb 9 6 oz)   09/28/19 83 9 kg (185 lb)   09/23/19 84 9 kg (187 lb 3 2 oz)   09/12/19 83 9 kg (185 lb)   09/10/19 83 9 kg (185 lb)     Estimated body mass index is 33 73 kg/m² as calculated from the following:    Height as of 1/13/20: 5' 2" (1 575 m)  Weight as of this encounter: 83 6 kg (184 lb 6 4 oz)  Wt  Change Since Last Visit: [x]Yes     []No  Amount: 4# loss favorable      Energy Needs: No calculations performed for this visit   Pain Screen: Are you having pain now? No      Goals Achieved: eating 3 meals a day, regular meals add snacks, good sleep- in bed 930-10 waking up at 530-6, good activity level-regular exercise, measuring all foods still until she learns them, then plans to measure less     New Goals:   1   continue 30-60gms CHO per meals   2  cotinue current activity level   3  Initial PES:    Overweight r/t excess calories AEB diet hx  New PES: No Change      New Problem List:  1 none new   2    3        Assessment:  Pleased with continued wt loss, measurements coming down, switched to almond milk instead of half and half  Has stopped binge eating, getting good activity in  Is following diabetic diet, 30-60gms CHO per meal, on average 45-60 maybe 70 now and then, but not often  Also watching the timing of her meals  Tries not to eat too late as it will increase her BG the next day       Medical Nutrition Therapy Intervention:  [x]Individualized Meal Plan- increase in veggies intake, more wraps, less candy and "bad stuff"; increase in water intake, drinking tea, less breads and chocolate, doing well with fruits and veggies; carries kind bars with her in addition to glucose tablets and gatorade, skittles for emergencies []Understanding Lab Values   []Basic Pathophysiology of Disease []Food/Medication Interactions   [x]Food Diary- is keeping daily diary including BG readings [x]Exercise- exercising regularly with website "[employer]", challenge in 17 days (on day 14); also 3-4x/wk riding bike 40 mins, working on feet 5 hours at a time   []Lifestyle/Behavior Modification Techniques []Medication, Mechanism of Action   []Label Reading [x]Self Blood Glucose Monitoring- checking BG at home   [x]Weight/BMI Goals-wants to continue to lose, no specific goal listed [x]Other - has tried keto diet had swelling in ankles etc   Other Notes:-still adapting to realities of DM; works at [employer]: [x]Excellent  []Very Good  []Good  []Fair   []Poor    Receptivity: [x]Excellent  []Very Good  []Good  []Fair   []Poor    Expected Compliance: []Excellent  [x]Very Good  []Good  []Fair   []Poor      Labs:  CMP  Lab Results   Component Value Date    K 3 8 12/17/2019     12/17/2019    CO2 25 12/17/2019    BUN 18 12/17/2019    CREATININE 0 58 12/17/2019    GLUF 118 (H) 11/19/2018    CALCIUM 8 9 11/19/2018    AST 12 12/17/2019    ALT 19 12/17/2019    ALKPHOS 79 11/19/2018    EGFR 98 11/19/2018       BMP  Lab Results   Component Value Date    CALCIUM 8 9 11/19/2018    K 3 8 12/17/2019    CO2 25 12/17/2019     12/17/2019    BUN 18 12/17/2019    CREATININE 0 58 12/17/2019       Lipids  No results found for: CHOL  No results found for: HDL  No results found for: LDLCALC  No results found for: TRIG  No results found for: CHOLHDL    Hemoglobin A1C  Lab Results   Component Value Date    HGBA1C 8 2 (H) 12/17/2019       Fasting Glucose  Lab Results   Component Value Date    GLUF 118 (H) 11/19/2018       Insulin     Thyroid  No results found for: TSH, L2ERTWG, B7NQUIU, THYROIDAB    Hepatic Function Panel  Lab Results   Component Value Date    ALT 19 12/17/2019    AST 12 12/17/2019    ALKPHOS 79 11/19/2018       Celiac Disease Antibody Panel  No results found for: ENDOMYSIAL IGA, GLIADIN IGA, GLIADIN IGG, IGA, TISSUE TRANSGLUT AB, TTG IGA   Iron  Lab Results   Component Value Date    IRON 90 10/28/2018    TIBC 272 10/28/2018    FERRITIN 53 10/28/2018       Vitamins  No results found for: VITAMIN B2   No results found for: NICOTINAMIDE, NICOTINIC ACID   No results found for: VITAMINB6  No results found for: [id number]  No results found for: VITB5  No results found for: J7JHMAIN  No results found for: THYROGLB  No results found for: VITAMIN K   25-HYDROXY VIT D   Date Value Ref Range Status   08/14/2019 24 3 (L) 30 0 - 100 0 ng/mL Final     Comment:     Vitamin D deficiency has been defined by the [place] of  Medicine and an Endocrine Society practice guideline as a  level of serum 25-OH vitamin D less than 20 ng/mL (1,2)  The Endocrine Society went on to further define vitamin D  insufficiency as a level between 21 and 29 ng/mL (2)  1  IOM ([place] [employer])  2010  Dietary reference     intakes for calcium and D  [facility]: The     [employer]  2  Jack MF, Alistair BETANCUR, Radha NUNEZ, et al      Evaluation, treatment, and prevention of vitamin D     deficiency: an Endocrine Society clinical practice     guideline  JCEM  2011 Jul; 96(7):1911-30  No components found for: VITAMINE     No follow-ups on file      [address] [address]

## 2020-01-24 DIAGNOSIS — K86.89 DILATED PANCREATIC DUCT: ICD-10-CM

## 2020-01-24 DIAGNOSIS — R91.1 RIGHT LOWER LOBE PULMONARY NODULE: Primary | ICD-10-CM

## 2020-01-24 NOTE — PROGRESS NOTES
Received notification again from radiology department for a follow-up CT of the chest for her 7 mm pulmonary nodule that was noted October 2018  We had tried having this done   In 2019 summer but her insurance had not approved  Patient also needs an MRI MRCP for dilation of the main pancreatic duct which was done October 18 also  Patient had declined having the MRI and wanted a CT scan instead but her insurance turned that down will reorder the MRI MRCP to evaluate that as well as suspected liver steatosis    Have left a message on patient's answering machine on her cell phone to call me so that we can get the scheduled for follow-up

## 2020-01-28 ENCOUNTER — TELEPHONE (OUTPATIENT)
Dept: FAMILY MEDICINE CLINIC | Facility: HOSPITAL | Age: 65
End: 2020-01-28

## 2020-01-29 DIAGNOSIS — E11.65 TYPE 2 DIABETES MELLITUS WITH HYPERGLYCEMIA, WITHOUT LONG-TERM CURRENT USE OF INSULIN (HCC): ICD-10-CM

## 2020-01-29 RX ORDER — BLOOD-GLUCOSE METER
EACH MISCELLANEOUS
Qty: 1 EACH | Refills: 0 | Status: SHIPPED | OUTPATIENT
Start: 2020-01-29

## 2020-01-29 RX ORDER — LANCETS
EACH MISCELLANEOUS
Qty: 100 EACH | Refills: 0 | Status: SHIPPED | OUTPATIENT
Start: 2020-01-29 | End: 2020-01-31 | Stop reason: CLARIF

## 2020-01-31 ENCOUNTER — OFFICE VISIT (OUTPATIENT)
Dept: FAMILY MEDICINE CLINIC | Facility: HOSPITAL | Age: 65
End: 2020-01-31
Payer: COMMERCIAL

## 2020-01-31 VITALS
WEIGHT: 187 LBS | DIASTOLIC BLOOD PRESSURE: 88 MMHG | BODY MASS INDEX: 34.41 KG/M2 | OXYGEN SATURATION: 98 % | SYSTOLIC BLOOD PRESSURE: 162 MMHG | HEART RATE: 78 BPM | HEIGHT: 62 IN

## 2020-01-31 DIAGNOSIS — I10 ESSENTIAL HYPERTENSION: Chronic | ICD-10-CM

## 2020-01-31 DIAGNOSIS — K86.2 PANCREATIC CYST: ICD-10-CM

## 2020-01-31 DIAGNOSIS — K21.9 GASTROESOPHAGEAL REFLUX DISEASE, ESOPHAGITIS PRESENCE NOT SPECIFIED: Chronic | ICD-10-CM

## 2020-01-31 DIAGNOSIS — R91.1 RIGHT LOWER LOBE PULMONARY NODULE: ICD-10-CM

## 2020-01-31 DIAGNOSIS — E11.65 TYPE 2 DIABETES MELLITUS WITH HYPERGLYCEMIA, WITHOUT LONG-TERM CURRENT USE OF INSULIN (HCC): Primary | ICD-10-CM

## 2020-01-31 DIAGNOSIS — E66.09 CLASS 1 OBESITY DUE TO EXCESS CALORIES WITH SERIOUS COMORBIDITY AND BODY MASS INDEX (BMI) OF 34.0 TO 34.9 IN ADULT: ICD-10-CM

## 2020-01-31 PROBLEM — E66.01 MORBID OBESITY (HCC): Status: RESOLVED | Noted: 2019-11-12 | Resolved: 2020-01-31

## 2020-01-31 PROBLEM — E66.811 CLASS 1 OBESITY DUE TO EXCESS CALORIES WITH SERIOUS COMORBIDITY IN ADULT: Status: ACTIVE | Noted: 2019-11-12

## 2020-01-31 PROBLEM — J31.0 CHRONIC RHINITIS: Status: ACTIVE | Noted: 2020-01-31

## 2020-01-31 PROCEDURE — 3008F BODY MASS INDEX DOCD: CPT | Performed by: INTERNAL MEDICINE

## 2020-01-31 PROCEDURE — 3725F SCREEN DEPRESSION PERFORMED: CPT | Performed by: INTERNAL MEDICINE

## 2020-01-31 PROCEDURE — 99214 OFFICE O/P EST MOD 30 MIN: CPT | Performed by: INTERNAL MEDICINE

## 2020-01-31 PROCEDURE — 1036F TOBACCO NON-USER: CPT | Performed by: INTERNAL MEDICINE

## 2020-01-31 NOTE — PROGRESS NOTES
Assessment/Plan:             Problem List Items Addressed This Visit        Digestive    GERD (gastroesophageal reflux disease) (Chronic)    Pancreatic cyst     Will reorder study - recommended by radiology to follow this            Endocrine    Type 2 diabetes mellitus with hyperglycemia, without long-term current use of insulin ([facility] [address] ) - Primary       Lab Results   Component Value Date    HGBA1C 8 2 (H) 12/17/2019   seeing dietician- testing glucose 4x day now   has not been taking metformin fasting 89- 110,   Joined a diabetes strong  Group    has been exercising for 30- 40 minutes   On her birthday she over ate - went over 0- then improved with exercise  Cardiovascular and Mediastinum    Hypertension (Chronic)       Other    Right lower lobe pulmonary nodule     Ct of lungs- hx of ct in 2018   will order         Class 1 obesity due to excess calories with serious comorbidity in adult            Subjective:      Patient ID: Nancie Baca is a 60 y o  female    1  Ongoing sinus pressure in maxillary region- no discolored drainage today- occasionally   2 diabetes       The following portions of the patient's history were reviewed and updated as appropriate: allergies, current medications and problem list      Review of Systems   HENT: Positive for congestion and postnasal drip  Respiratory: Negative for cough and shortness of breath  All other systems reviewed and are negative          Objective:      Current Outpatient Medications:     acetaminophen (TYLENOL) 325 mg tablet, Take 650 mg by mouth every 6 (six) hours as needed for mild pain, Disp: , Rfl:     Ascorbic Acid (VITAMIN C) 100 MG tablet, Take 100 mg by mouth daily, Disp: , Rfl:     b complex vitamins capsule, Take 1 capsule by mouth daily, Disp: , Rfl:     Blood Glucose Monitoring Suppl (ONE TOUCH ULTRA 2) w/Device KIT, Testing 4 times daily, Disp: 1 each, Rfl: 0    Cholecalciferol (VITAMIN D3) 2000 units TABS, 1 daily, Disp: , Rfl:   glucosamine-chondroitin 500-400 MG tablet, Take 1 tablet by mouth 3 (three) times a day, Disp: , Rfl:     glucose blood (ONE TOUCH ULTRA TEST) test strip, Testing 4 times daily, Disp: 100 each, Rfl: 5    Omega-3 Fatty Acids (FISH OIL) 1,000 mg, Take 1,000 mg by mouth daily, Disp: , Rfl:     omeprazole (PriLOSEC OTC) 20 MG tablet, 1 daily prn only, Disp: , Rfl:     saccharomyces boulardii (FLORASTOR) 250 mg capsule, Take 250 mg by mouth 2 (two) times a day, Disp: , Rfl:     Turmeric 500 MG CAPS, Take by mouth 1 daily, Disp: , Rfl:     vitamin A 7500 UNIT capsule, Take 7,500 Units by mouth daily, Disp: , Rfl:     vitamin E, tocopherol, 1,000 units capsule, Take 1,000 Units by mouth daily, Disp: , Rfl:     metFORMIN (GLUCOPHAGE) 500 mg tablet, Take 1 tablet (500 mg total) by mouth 2 (two) times a day with meals (Patient not taking: Reported on 1/31/2020), Disp: 60 tablet, Rfl: 0    Zoster Vac Recomb Adjuvanted (SHINGRIX) 50 MCG/0 5ML SUSR, Inject 2 Doses into a muscle see administration instructions 2 doses 2- 6 months apart (Patient not taking: Reported on 1/31/2020), Disp: 2 each, Rfl: 0    Blood pressure 162/88, pulse 78, height 5' 2" (1 575 m), weight 84 8 kg (187 lb), SpO2 98 %, not currently breastfeeding  Physical Exam   HENT:   Head: Normocephalic  Right Ear: External ear normal    Left Ear: External ear normal    Eyes: Right eye exhibits no discharge  Left eye exhibits no discharge  Neck: No thyromegaly present  Cardiovascular: Normal rate and regular rhythm  Exam reveals no friction rub  No murmur heard  Pulmonary/Chest: Breath sounds normal  She has no wheezes  Abdominal: Soft  Bowel sounds are normal  She exhibits no distension  There is no guarding  Musculoskeletal: She exhibits no edema or deformity  Mild swelling in knees- has braces on   Psychiatric: She has a normal mood and affect  Her behavior is normal    Nursing note and vitals reviewed

## 2020-01-31 NOTE — ASSESSMENT & PLAN NOTE
Lab Results   Component Value Date    HGBA1C 8 2 (H) 12/17/2019   seeing dietician- testing glucose 4x day now   has not been taking metformin fasting 89- 110,   Joined a diabetes strong  Group    has been exercising for 30- 40 minutes   On her birthday she over ate - went over 0- then improved with exercise

## 2020-02-28 ENCOUNTER — OFFICE VISIT (OUTPATIENT)
Dept: FAMILY MEDICINE CLINIC | Facility: HOSPITAL | Age: 65
End: 2020-02-28
Payer: COMMERCIAL

## 2020-02-28 VITALS
SYSTOLIC BLOOD PRESSURE: 138 MMHG | DIASTOLIC BLOOD PRESSURE: 76 MMHG | HEIGHT: 62 IN | TEMPERATURE: 98.2 F | WEIGHT: 187.8 LBS | BODY MASS INDEX: 34.56 KG/M2 | HEART RATE: 85 BPM

## 2020-02-28 DIAGNOSIS — I10 ESSENTIAL HYPERTENSION: Chronic | ICD-10-CM

## 2020-02-28 DIAGNOSIS — E11.65 TYPE 2 DIABETES MELLITUS WITH HYPERGLYCEMIA, WITHOUT LONG-TERM CURRENT USE OF INSULIN (HCC): ICD-10-CM

## 2020-02-28 DIAGNOSIS — J01.01 ACUTE RECURRENT MAXILLARY SINUSITIS: Primary | ICD-10-CM

## 2020-02-28 PROCEDURE — 3078F DIAST BP <80 MM HG: CPT | Performed by: INTERNAL MEDICINE

## 2020-02-28 PROCEDURE — 99212 OFFICE O/P EST SF 10 MIN: CPT | Performed by: INTERNAL MEDICINE

## 2020-02-28 PROCEDURE — 1036F TOBACCO NON-USER: CPT | Performed by: INTERNAL MEDICINE

## 2020-02-28 PROCEDURE — 2022F DILAT RTA XM EVC RTNOPTHY: CPT | Performed by: INTERNAL MEDICINE

## 2020-02-28 PROCEDURE — 3008F BODY MASS INDEX DOCD: CPT | Performed by: INTERNAL MEDICINE

## 2020-02-28 PROCEDURE — 3075F SYST BP GE 130 - 139MM HG: CPT | Performed by: INTERNAL MEDICINE

## 2020-02-28 RX ORDER — CEFUROXIME AXETIL 500 MG/1
500 TABLET ORAL EVERY 12 HOURS SCHEDULED
Qty: 14 TABLET | Refills: 0 | Status: SHIPPED | OUTPATIENT
Start: 2020-02-28 | End: 2020-03-06

## 2020-02-28 RX ORDER — LANCETS 33 GAUGE
EACH MISCELLANEOUS
COMMUNITY
Start: 2020-01-31

## 2020-02-28 NOTE — PROGRESS NOTES
Assessment/Plan:             Problem List Items Addressed This Visit        Endocrine    Type 2 diabetes mellitus with hyperglycemia, without long-term current use of insulin ([address] )      Other Visit Diagnoses     Acute recurrent maxillary sinusitis    -  Primary            Subjective:      Patient ID: Roldan Matthew is a 60 y o  female    1  Sinus pressure for 2 weeks and now has turned green on drainage and having some thick post nasal drip  - no fever but sob with thick phlegm coughing   no [place] exposures      The following portions of the patient's history were reviewed and updated as appropriate: allergies, current medications and problem list      Review of Systems   HENT: Positive for congestion, postnasal drip and sore throat  Respiratory: Positive for cough  Cardiovascular: Negative for chest pain and palpitations  Gastrointestinal: Negative for abdominal distention and constipation  All other systems reviewed and are negative          Objective:      Current Outpatient Medications:     acetaminophen (TYLENOL) 325 mg tablet, Take 650 mg by mouth every 6 (six) hours as needed for mild pain, Disp: , Rfl:     Ascorbic Acid (VITAMIN C) 100 MG tablet, Take 100 mg by mouth daily, Disp: , Rfl:     b complex vitamins capsule, Take 1 capsule by mouth daily, Disp: , Rfl:     Blood Glucose Monitoring Suppl (ONE TOUCH ULTRA 2) w/Device KIT, Testing 4 times daily, Disp: 1 each, Rfl: 0    Cholecalciferol (VITAMIN D3) 2000 units TABS, 1 daily, Disp: , Rfl:     glucosamine-chondroitin 500-400 MG tablet, Take 1 tablet by mouth 3 (three) times a day, Disp: , Rfl:     glucose blood (ONE TOUCH ULTRA TEST) test strip, Testing 4 times daily, Disp: 100 each, Rfl: 5    Lancets (ONETOUCH DELICA PLUS [id number]) MISC, , Disp: , Rfl:     Omega-3 Fatty Acids (FISH OIL) 1,000 mg, Take 1,000 mg by mouth daily, Disp: , Rfl:     omeprazole (PriLOSEC OTC) 20 MG tablet, 1 daily prn only, Disp: , Rfl:     saccharomyces boulardii (FLORASTOR) 250 mg capsule, Take 250 mg by mouth 2 (two) times a day, Disp: , Rfl:     Turmeric 500 MG CAPS, Take by mouth 1 daily, Disp: , Rfl:     vitamin A 7500 UNIT capsule, Take 7,500 Units by mouth daily, Disp: , Rfl:     vitamin E, tocopherol, 1,000 units capsule, Take 1,000 Units by mouth daily, Disp: , Rfl:     Zoster Vac Recomb Adjuvanted (SHINGRIX) 50 MCG/0 5ML SUSR, Inject 2 Doses into a muscle see administration instructions 2 doses 2- 6 months apart (Patient not taking: Reported on 1/31/2020), Disp: 2 each, Rfl: 0    Blood pressure 138/76, pulse 85, temperature 98 2 °F (36 8 °C), temperature source Tympanic, height 5' 2" (1 575 m), weight 85 2 kg (187 lb 12 8 oz), not currently breastfeeding  Physical Exam   Constitutional: She appears well-developed and well-nourished  She appears distressed  Cough and hoarseness   HENT:   Head: Normocephalic  Right Ear: External ear normal    Left Ear: External ear normal    Mild injection in pharynx  maxillary sinus tenderness bialteral - mild frontal tenderness   Eyes: Right eye exhibits no discharge  Left eye exhibits no discharge  No scleral icterus  Neck: No thyromegaly present  Cardiovascular: Normal rate  Exam reveals no friction rub  No murmur heard  Pulmonary/Chest: Effort normal and breath sounds normal  No respiratory distress  She has no wheezes  Occasional cough   Abdominal: Soft  Bowel sounds are normal  She exhibits no distension  There is no guarding  Musculoskeletal: She exhibits no edema  Lymphadenopathy:     She has cervical adenopathy  Nursing note and vitals reviewed

## 2020-03-12 ENCOUNTER — OFFICE VISIT (OUTPATIENT)
Dept: PODIATRY | Facility: CLINIC | Age: 65
End: 2020-03-12

## 2020-03-12 VITALS
SYSTOLIC BLOOD PRESSURE: 135 MMHG | BODY MASS INDEX: 34.6 KG/M2 | WEIGHT: 188 LBS | DIASTOLIC BLOOD PRESSURE: 74 MMHG | HEIGHT: 62 IN

## 2020-03-12 DIAGNOSIS — L60.8 PINCER NAIL DEFORMITY: Primary | ICD-10-CM

## 2020-03-12 PROCEDURE — 3008F BODY MASS INDEX DOCD: CPT | Performed by: INTERNAL MEDICINE

## 2020-03-12 PROCEDURE — NCFTCARE PR NON-COVERED FOOT CARE: Performed by: PODIATRIST

## 2020-03-12 NOTE — PROGRESS NOTES
Maura Horner  1955    This patient presents today for care of toenails  The patient does not have high risk and this service is not a covered benefit  Patient is aware this service is not covered by insurance and understands she will incur an out of pocket cost  Nails trimmed  Patient will follow-up prn       Dx: Pincer toenails

## 2020-10-15 ENCOUNTER — OFFICE VISIT (OUTPATIENT)
Dept: PODIATRY | Facility: CLINIC | Age: 65
End: 2020-10-15

## 2020-10-15 VITALS — HEIGHT: 62 IN | WEIGHT: 180 LBS | BODY MASS INDEX: 33.13 KG/M2 | TEMPERATURE: 98.6 F

## 2020-10-15 DIAGNOSIS — L60.8 PINCER NAIL DEFORMITY: Primary | ICD-10-CM

## 2020-10-15 PROCEDURE — NCFTCARE PR NON-COVERED FOOT CARE: Performed by: PODIATRIST

## 2020-12-24 ENCOUNTER — OFFICE VISIT (OUTPATIENT)
Dept: PODIATRY | Facility: CLINIC | Age: 65
End: 2020-12-24

## 2020-12-24 VITALS
TEMPERATURE: 98.2 F | BODY MASS INDEX: 33.13 KG/M2 | HEIGHT: 62 IN | DIASTOLIC BLOOD PRESSURE: 98 MMHG | WEIGHT: 180 LBS | SYSTOLIC BLOOD PRESSURE: 183 MMHG | HEART RATE: 82 BPM

## 2020-12-24 DIAGNOSIS — L60.8 PINCER NAIL DEFORMITY: Primary | ICD-10-CM

## 2020-12-24 DIAGNOSIS — L60.0 INGROWING NAIL: ICD-10-CM

## 2020-12-24 DIAGNOSIS — E11.65 TYPE 2 DIABETES MELLITUS WITH HYPERGLYCEMIA, WITHOUT LONG-TERM CURRENT USE OF INSULIN (HCC): ICD-10-CM

## 2020-12-24 PROCEDURE — 99213 OFFICE O/P EST LOW 20 MIN: CPT | Performed by: PODIATRIST

## 2021-03-11 ENCOUNTER — OFFICE VISIT (OUTPATIENT)
Dept: PODIATRY | Facility: CLINIC | Age: 66
End: 2021-03-11
Payer: COMMERCIAL

## 2021-03-11 VITALS
WEIGHT: 187.2 LBS | DIASTOLIC BLOOD PRESSURE: 92 MMHG | HEART RATE: 94 BPM | HEIGHT: 62 IN | BODY MASS INDEX: 34.45 KG/M2 | SYSTOLIC BLOOD PRESSURE: 182 MMHG

## 2021-03-11 DIAGNOSIS — L60.8 PINCER NAIL DEFORMITY: Primary | ICD-10-CM

## 2021-03-11 DIAGNOSIS — E11.65 TYPE 2 DIABETES MELLITUS WITH HYPERGLYCEMIA, WITHOUT LONG-TERM CURRENT USE OF INSULIN (HCC): ICD-10-CM

## 2021-03-11 DIAGNOSIS — L60.0 INGROWING NAIL: ICD-10-CM

## 2021-03-11 PROCEDURE — 1036F TOBACCO NON-USER: CPT | Performed by: PODIATRIST

## 2021-03-11 PROCEDURE — 3008F BODY MASS INDEX DOCD: CPT | Performed by: PODIATRIST

## 2021-03-11 PROCEDURE — 99212 OFFICE O/P EST SF 10 MIN: CPT | Performed by: PODIATRIST

## 2021-03-11 RX ORDER — MAGNESIUM 30 MG
250 TABLET ORAL DAILY
COMMUNITY

## 2021-03-11 NOTE — PROGRESS NOTES
Assessment/Plan:      Diagnoses and all orders for this visit:    Pincer nail deformity    Ingrowing nail    Type 2 diabetes mellitus with hyperglycemia, without long-term current use of insulin (HCC)    Other orders  -     magnesium 30 MG tablet; Take 30 mg by mouth 2 (two) times a day      patient has distal lateral ingrown nail to left great toe due to toenail deformity  A simple slant back procedure was performed  There is no purulence or cellulitis today  She is to soak for 3 days apply small amount of Neosporin and a Band-Aid  She should call if this does not resolve her issue  Subjective:     Patient ID: Shira Santiago is a 73 y o  female  Patient has left great toe lateral ingrown nail  She drained some pus yesterday, it is tender  Patient has history of well-controlled diabetes but no neuropathy  She has had ingrown nails in the past       Review of Systems   Constitutional: Negative  Musculoskeletal: Negative for arthralgias and gait problem  Skin: Positive for color change  Neurological: Negative for weakness and numbness  Objective:     Physical Exam  Constitutional:       Appearance: She is obese  She is not ill-appearing or diaphoretic  Cardiovascular:      Pulses: Normal pulses  Skin:     Comments: The left lateral great toenail has a large nail spicule on the distal lateral corner  There is tenderness  There is no purulence or cellulitis   Neurological:      Mental Status: She is alert

## 2021-03-23 ENCOUNTER — TELEPHONE (OUTPATIENT)
Dept: FAMILY MEDICINE CLINIC | Facility: HOSPITAL | Age: 66
End: 2021-03-23

## 2021-03-23 DIAGNOSIS — E11.65 TYPE 2 DIABETES MELLITUS WITH HYPERGLYCEMIA, WITHOUT LONG-TERM CURRENT USE OF INSULIN (HCC): Primary | ICD-10-CM

## 2021-03-23 NOTE — TELEPHONE ENCOUNTER
Patient needs to be seen for a yearly appointment -Medicare wellness  -not sure if this is the initial or subsequent   also will put in for labs to have those done before appointment if possible

## 2021-05-18 ENCOUNTER — HOSPITAL ENCOUNTER (OUTPATIENT)
Dept: RADIOLOGY | Facility: HOSPITAL | Age: 66
Discharge: HOME | End: 2021-05-18
Attending: INTERNAL MEDICINE
Payer: COMMERCIAL

## 2021-05-18 ENCOUNTER — OFFICE VISIT (OUTPATIENT)
Dept: FAMILY MEDICINE CLINIC | Facility: HOSPITAL | Age: 66
End: 2021-05-18
Payer: COMMERCIAL

## 2021-05-18 VITALS
HEART RATE: 85 BPM | OXYGEN SATURATION: 95 % | BODY MASS INDEX: 33.93 KG/M2 | SYSTOLIC BLOOD PRESSURE: 160 MMHG | DIASTOLIC BLOOD PRESSURE: 86 MMHG | WEIGHT: 184.4 LBS | HEIGHT: 62 IN

## 2021-05-18 DIAGNOSIS — K76.0 HEPATIC STEATOSIS: ICD-10-CM

## 2021-05-18 DIAGNOSIS — E89.40 POSTSURGICAL OVARIAN FAILURE: ICD-10-CM

## 2021-05-18 DIAGNOSIS — Z12.11 SCREEN FOR COLON CANCER: ICD-10-CM

## 2021-05-18 DIAGNOSIS — Z23 NEED FOR SHINGLES VACCINE: ICD-10-CM

## 2021-05-18 DIAGNOSIS — R05.9 COUGH: ICD-10-CM

## 2021-05-18 DIAGNOSIS — I10 ESSENTIAL HYPERTENSION: Chronic | ICD-10-CM

## 2021-05-18 DIAGNOSIS — E11.65 TYPE 2 DIABETES MELLITUS WITH HYPERGLYCEMIA, WITHOUT LONG-TERM CURRENT USE OF INSULIN (HCC): ICD-10-CM

## 2021-05-18 DIAGNOSIS — Z12.31 ENCOUNTER FOR SCREENING MAMMOGRAM FOR MALIGNANT NEOPLASM OF BREAST: ICD-10-CM

## 2021-05-18 DIAGNOSIS — Z00.00 MEDICARE WELCOME EXAM: Primary | ICD-10-CM

## 2021-05-18 DIAGNOSIS — E04.2 MULTIPLE THYROID NODULES: ICD-10-CM

## 2021-05-18 DIAGNOSIS — C43.4 MALIGNANT MELANOMA OF NECK (HCC): ICD-10-CM

## 2021-05-18 DIAGNOSIS — E66.09 CLASS 1 OBESITY DUE TO EXCESS CALORIES WITH SERIOUS COMORBIDITY AND BODY MASS INDEX (BMI) OF 34.0 TO 34.9 IN ADULT: ICD-10-CM

## 2021-05-18 PROCEDURE — 3288F FALL RISK ASSESSMENT DOCD: CPT | Performed by: INTERNAL MEDICINE

## 2021-05-18 PROCEDURE — G0402 INITIAL PREVENTIVE EXAM: HCPCS | Performed by: INTERNAL MEDICINE

## 2021-05-18 PROCEDURE — 71046 X-RAY EXAM CHEST 2 VIEWS: CPT

## 2021-05-18 PROCEDURE — 3725F SCREEN DEPRESSION PERFORMED: CPT | Performed by: INTERNAL MEDICINE

## 2021-05-18 PROCEDURE — 1101F PT FALLS ASSESS-DOCD LE1/YR: CPT | Performed by: INTERNAL MEDICINE

## 2021-05-18 RX ORDER — ZOSTER VACCINE RECOMBINANT, ADJUVANTED 50 MCG/0.5
2 KIT INTRAMUSCULAR SEE ADMIN INSTRUCTIONS
Qty: 2 EACH | Refills: 0 | Status: SHIPPED | OUTPATIENT
Start: 2021-05-18

## 2021-05-18 NOTE — ASSESSMENT & PLAN NOTE
Lab Results   Component Value Date    HGBA1C 8 2 (H) 12/17/2019   saw dietician- alison s bread   working at [employer]- will be [employer] after work

## 2021-05-18 NOTE — PROGRESS NOTES
Assessment and Plan:     Problem List Items Addressed This Visit        Digestive    Hepatic steatosis       Endocrine    Type 2 diabetes mellitus with hyperglycemia, without long-term current use of insulin ([facility] [facility] )       Lab Results   Component Value Date    HGBA1C 8 2 (H) 12/17/2019   saw dietician- alison s bread   working at [employer]- will be [employer] after work         Relevant Orders    Ambulatory referral to Nutrition Services    Multiple thyroid nodules    Relevant Orders    US thyroid       Cardiovascular and Mediastinum    Hypertension (Chronic)       Other    Class 1 obesity due to excess calories with serious comorbidity in adult    Malignant melanoma of neck ([facility] [address] )    Relevant Orders    Ambulatory referral to Dermatology      Other Visit Diagnoses     Medicare welcome exam    -  Primary    Postsurgical ovarian failure        Relevant Orders    DXA bone density spine hip and pelvis    Encounter for screening mammogram for malignant neoplasm of breast        Relevant Orders    Mammo screening bilateral w 3d & cad    Screen for colon cancer        Relevant Orders    Ambulatory referral to Gastroenterology    Cough        Relevant Orders    XR chest pa & lateral        BMI Counseling: Body mass index is 33 73 kg/m²  The BMI is above normal  Nutrition recommendations include encouraging healthy choices of fruits and vegetables, moderation in carbohydrate intake and increasing intake of lean protein  Exercise recommendations include exercising 3-5 times per week  Walking on rail trail        Preventive health issues were discussed with patient, and age appropriate screening tests were ordered as noted in patient's After Visit Summary  Personalized health advice and appropriate referrals for health education or preventive services given if needed, as noted in patient's After Visit Summary  History of Present Illness:     Patient presents for Welcome to Medicare visit       Patient Care Team:  Sharita Sahu DO as PCP - General (Internal Medicine)  Sierra Alcantar DO as PCP - [address] [facility] [facility] ([facility]  JUDY [place]     Review of Systems:     Review of Systems   Constitutional: Negative for appetite change and fatigue  Home bp readings are good- 109- 130   had one night with low bp 89 systolic then 10 minutes later was normal 115- discussed hydration   Respiratory: Positive for cough  Some am discolored sputum- has some occasional reflux and yellow streaking at times  Gastrointestinal: Negative for abdominal pain  Musculoskeletal:        Sees podiatry for nail care   Skin:        Hx melanoma- some facial areas of skin lesion   Psychiatric/Behavioral: Negative  All other systems reviewed and are negative  Problem List:     Patient Active Problem List   Diagnosis    GERD (gastroesophageal reflux disease)    Hypertension    Type 2 diabetes mellitus with hyperglycemia, without long-term current use of insulin (HCC)    Dilated pancreatic duct    Duodenal ulcer    Multiple thyroid nodules    Right lower lobe pulmonary nodule    Osteoarthritis of right ankle and foot    Hammer toes of both feet    Class 1 obesity due to excess calories with serious comorbidity in adult    Hepatic steatosis    Pancreatic cyst    Chronic rhinitis    Malignant melanoma of neck ([address] )      Past Medical and Surgical History:     Past Medical History:   Diagnosis Date    Diabetes mellitus ([address] )     prediabetic     Hypertension     Irritable bowel disease     Melanoma ([address] )     Vertigo      Past Surgical History:   Procedure Laterality Date    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    ESOPHAGOGASTRODUODENOSCOPY N/A 10/28/2018    Procedure: ESOPHAGOGASTRODUODENOSCOPY (EGD);   Surgeon: Curtis Jay MD;  Location:  MAIN OR;  Service: Gastroenterology    HYSTERECTOMY      0949'Y    OOPHORECTOMY Left       Family History:     Family History   Problem Relation Age of Onset    Substance Abuse Father         alcohol    Alcohol abuse Father     No Known Problems Mother     No Known Problems Daughter     No Known Problems Maternal Grandmother     Colon cancer Maternal Grandfather         age unknown    No Known Problems Paternal Grandmother     No Known Problems Paternal Grandfather     No Known Problems Maternal Aunt     No Known Problems Paternal Aunt     No Known Problems Paternal Aunt     No Known Problems Paternal Aunt     No Known Problems Paternal Aunt     No Known Problems Paternal [address]     Colon cancer Cousin         19's    Mental illness Neg Hx       Social History:     E-Cigarette/Vaping    E-Cigarette Use Never User      E-Cigarette/Vaping Substances    Nicotine No     THC No     CBD No     Flavoring No     Other No     Unknown No      Social History     Socioeconomic History    Marital status: /Civil Union     Spouse name: None    Number of children: None    Years of education: None    Highest education level: None   Occupational History    None   Social Needs    Financial resource strain: None    Food insecurity     Worry: None     Inability: None    Transportation needs     Medical: No     Non-medical: No   Tobacco Use    Smoking status: Former Smoker     Packs/day: 1 00     Years: 10 00     Pack years: 10 00     Types: Cigarettes     Quit date: 1969     Years since quittin 5    Smokeless tobacco: Never Used   Substance and Sexual Activity    Alcohol use: No    Drug use: No    Sexual activity: Not Currently     Partners: Male   Lifestyle    Physical activity     Days per week: None     Minutes per session: None    Stress: None   Relationships    Social connections     Talks on phone: None     Gets together: None     Attends [employer]ism service: None     Active member of club or organization: None     Attends meetings of clubs or organizations: None     Relationship status: None    Intimate partner violence     Fear of current or ex partner: None     Emotionally abused: None     Physically abused: None     Forced sexual activity: None   Other Topics Concern    None   Social History Narrative    Lives alone---    Feels safe at home  Has dentures  No living will  Exercises daily--exercise bike and walking to dvd  Medications and Allergies:     Current Outpatient Medications   Medication Sig Dispense Refill    acetaminophen (TYLENOL) 325 mg tablet Take 650 mg by mouth every 6 (six) hours as needed for mild pain      Ascorbic Acid (VITAMIN C) 100 MG tablet Take 100 mg by mouth daily      b complex vitamins capsule Take 1 capsule by mouth daily      Blood Glucose Monitoring Suppl (ONE TOUCH ULTRA 2) w/Device KIT Testing 4 times daily 1 each 0    Cholecalciferol (VITAMIN D3) 2000 units TABS 1 daily      glucosamine-chondroitin 500-400 MG tablet Take 1 tablet by mouth 3 (three) times a day      glucose blood (ONE TOUCH ULTRA TEST) test strip Testing 4 times daily 100 each 5    Lancets (ONETOUCH DELICA PLUS [id number]) MISC       magnesium 30 MG tablet Take 250 mg by mouth daily       Omega-3 Fatty Acids (FISH OIL) 1,000 mg Take 1,000 mg by mouth daily      omeprazole (PriLOSEC OTC) 20 MG tablet 1 daily prn only      Protein POWD Take by mouth      saccharomyces boulardii (FLORASTOR) 250 mg capsule Take 250 mg by mouth 2 (two) times a day      Turmeric 500 MG CAPS Take by mouth 1 daily      vitamin A 7500 UNIT capsule Take 7,500 Units by mouth daily      vitamin E, tocopherol, 1,000 units capsule Take 1,000 Units by mouth daily      Zoster Vac Recomb Adjuvanted (SHINGRIX) 50 MCG/0 5ML SUSR Inject 2 Doses into a muscle see administration instructions 2 doses 2- 6 months apart (Patient not taking: Reported on 1/31/2020) 2 each 0     No current facility-administered medications for this visit        Allergies   Allergen Reactions    Prednisone Anxiety    Corticosteroids Anxiety      Immunizations:     Immunization History   Administered Date(s) Administered    SARS-CoV-2 / COVID-19 mRNA IM (Pfizer-[employer]) 03/10/2021, 03/31/2021      Health Maintenance:         Topic Date Due    HIV Screening  Never done    Colorectal Cancer Screening  Never done    MAMMOGRAM  01/13/2021    Hepatitis C Screening  Completed         Topic Date Due    DTaP,Tdap,and Td Vaccines (1 - Tdap) Never done    Pneumococcal Vaccine: 65+ Years (1 of 1 - PPSV23) Never done    COVID-19 Vaccine (2 - Pfizer 2-dose series) 03/31/2021      Medicare Screening Tests and Risk Assessments:     Bettie Salazar is here for her Welcome to Medicare visit  Health Risk Assessment:   Patient rates overall health as very good  Patient feels that their physical health rating is slightly better  Patient is very satisfied with their life  Eyesight was rated as same  Hearing was rated as same  Patient feels that their emotional and mental health rating is same  Patients states they are never, rarely angry  Patient states they are never, rarely unusually tired/fatigued  Pain experienced in the last 7 days has been some  Patient's pain rating has been 6/10  Patient states that she has experienced no weight loss or gain in last 6 months  Depression Screening:   PHQ-2 Score: 0      Fall Risk Screening: In the past year, patient has experienced: no history of falling in past year      Urinary Incontinence Screening:   Patient has not leaked urine accidently in the last six months  Home Safety:  Patient does not have trouble with stairs inside or outside of their home  Patient has working smoke alarms and has working carbon monoxide detector  Home safety hazards include: none  Nutrition:   Current diet is Regular and Diabetic  Medications:   Patient is currently taking over-the-counter supplements  OTC medications include: see medication list  Patient is able to manage medications       Activities of Daily Living (ADLs)/Instrumental Activities of Daily Living (IADLs):   Walk and transfer into and out of bed and chair?: Yes  Dress and groom yourself?: Yes    Bathe or shower yourself?: Yes    Feed yourself? Yes  Do your laundry/housekeeping?: Yes  Manage your money, pay your bills and track your expenses?: Yes  Make your own meals?: Yes    Do your own shopping?: Yes    Previous Hospitalizations:   Any hospitalizations or ED visits within the last 12 months?: No      Advance Care Planning:   Living will: No    Five wishes given: Yes    End of Life Decisions reviewed with patient: Yes      Comments: Level 1 - full code- would not long term support if no hope for recovery-   son Kali Leavitt is medical decision maker if she was not capable    Cognitive Screening:   Provider or family/friend/caregiver concerned regarding cognition?: No    PREVENTIVE SCREENINGS      Cardiovascular Screening:    General: Risks and Benefits Discussed    Due for: Lipid Panel      Diabetes Screening:     General: History Diabetes    Due for: Blood Glucose      Colorectal Cancer Screening:     General: Risks and Benefits Discussed    Due for: Colonoscopy - Low Risk      Breast Cancer Screening:     General: Screening Current and Risks and Benefits Discussed      Cervical Cancer Screening:    General: Screening Not Indicated      Osteoporosis Screening:    General: Risks and Benefits Discussed      Abdominal Aortic Aneurysm (AAA) Screening:        General: Risks and Benefits Discussed      Lung Cancer Screening:     General: Screening Not Indicated      Hepatitis C Screening:    General: Screening Current    Screening, Brief Intervention, and Referral to Treatment (SBIRT)    Screening  Typical number of drinks in a day: 0  Typical number of drinks in a week: 0  Interpretation: Low risk drinking behavior      Single Item Drug Screening:  How often have you used an illegal drug (including marijuana) or a prescription medication for non-medical reasons in the past year? never    Single Item Drug Screen Score: 0  Interpretation: Negative screen for possible drug use disorder     Visual Acuity Screening    Right eye Left eye Both eyes   Without correction:      With correction: 20/50 20/30 20/30   Hearing Screening Comments: No loss to general  speaking     Physical Exam:     /86   Pulse 85   Ht 5' 2" (1 575 m)   Wt 83 6 kg (184 lb 6 4 oz)   LMP  (LMP Unknown)   SpO2 95%   BMI 33 73 kg/m²     Physical Exam  Vitals signs and nursing note reviewed  Constitutional:       General: She is not in acute distress  Appearance: She is well-developed  HENT:      Head: Normocephalic and atraumatic  Right Ear: Tympanic membrane normal       Left Ear: Tympanic membrane normal       Mouth/Throat:      Mouth: Mucous membranes are dry  Pharynx: No oropharyngeal exudate  Eyes:      Conjunctiva/sclera: Conjunctivae normal    Neck:      Musculoskeletal: Neck supple  Comments: Right thyroid nodular goiter  Cardiovascular:      Rate and Rhythm: Normal rate and regular rhythm  Heart sounds: No murmur  Pulmonary:      Effort: Pulmonary effort is normal  No respiratory distress  Breath sounds: Normal breath sounds  Abdominal:      Palpations: Abdomen is soft  Tenderness: There is no abdominal tenderness  Lymphadenopathy:      Cervical: No cervical adenopathy  Skin:     General: Skin is warm and dry  Neurological:      General: No focal deficit present  Mental Status: She is alert and oriented to person, place, and time  Mental status is at baseline            Marie Patel DO

## 2021-05-18 NOTE — PATIENT INSTRUCTIONS
Medicare Preventive Visit Patient Instructions  Thank you for completing your Welcome to Medicare Visit or Medicare Annual Wellness Visit today  Your next wellness visit will be due in one year (5/19/2022)  The screening/preventive services that you may require over the next 5-10 years are detailed below  Some tests may not apply to you based off risk factors and/or age  Screening tests ordered at today's visit but not completed yet may show as past due  Also, please note that scanned in results may not display below  Preventive Screenings:  Service Recommendations Previous Testing/Comments   Colorectal Cancer Screening  * Colonoscopy    * Fecal Occult Blood Test (FOBT)/Fecal Immunochemical Test (FIT)  * Fecal DNA/Cologuard Test  * Flexible Sigmoidoscopy Age: 54-65 years old   Colonoscopy: every 10 years (may be performed more frequently if at higher risk)  OR  FOBT/FIT: every 1 year  OR  Cologuard: every 3 years  OR  Sigmoidoscopy: every 5 years  Screening may be recommended earlier than age 48 if at higher risk for colorectal cancer  Also, an individualized decision between you and your healthcare provider will decide whether screening between the ages of 74-80 would be appropriate  Colonoscopy: Not on file  FOBT/FIT: Not on file  Cologuard: Not on file  Sigmoidoscopy: Not on file          Breast Cancer Screening Age: 36 years old  Frequency: every 1-2 years  Not required if history of left and right mastectomy Mammogram: 01/13/2020    Screening Current   Cervical Cancer Screening Between the ages of 21-29, pap smear recommended once every 3 years  Between the ages of 33-67, can perform pap smear with HPV co-testing every 5 years     Recommendations may differ for women with a history of total hysterectomy, cervical cancer, or abnormal pap smears in past  Pap Smear: Not on file    Screening Not Indicated   Hepatitis C Screening Once for adults born between 1945 and 1965  More frequently in patients at high risk for Hepatitis C Hep C Antibody: 12/17/2019    Screening Current   Diabetes Screening 1-2 times per year if you're at risk for diabetes or have pre-diabetes Fasting glucose: 118 mg/dL   A1C: 8 2 %    Screening Not Indicated  History Diabetes   Cholesterol Screening Once every 5 years if you don't have a lipid disorder  May order more often based on risk factors  Lipid panel: Not on file          Other Preventive Screenings Covered by Medicare:  1  Abdominal Aortic Aneurysm (AAA) Screening: covered once if your at risk  You're considered to be at risk if you have a family history of AAA  2  Lung Cancer Screening: covers low dose CT scan once per year if you meet all of the following conditions: (1) Age 50-69; (2) No signs or symptoms of lung cancer; (3) Current smoker or have quit smoking within the last 15 years; (4) You have a tobacco smoking history of at least 30 pack years (packs per day multiplied by number of years you smoked); (5) You get a written order from a healthcare provider  3  Glaucoma Screening: covered annually if you're considered high risk: (1) You have diabetes OR (2) Family history of glaucoma OR (3)  aged 48 and older OR (3)  American aged 72 and older  3  Osteoporosis Screening: covered every 2 years if you meet one of the following conditions: (1) You're estrogen deficient and at risk for osteoporosis based off medical history and other findings; (2) Have a vertebral abnormality; (3) On glucocorticoid therapy for more than 3 months; (4) Have primary hyperparathyroidism; (5) On osteoporosis medications and need to assess response to drug therapy  · Last bone density test (DXA Scan): Not on file  5  HIV Screening: covered annually if you're between the age of 12-76  Also covered annually if you are younger than 13 and older than 72 with risk factors for HIV infection   For pregnant patients, it is covered up to 3 times per pregnancy  Immunizations:  Immunization Recommendations   Influenza Vaccine Annual influenza vaccination during flu season is recommended for all persons aged >= 6 months who do not have contraindications   Pneumococcal Vaccine (Prevnar and Pneumovax)  * Prevnar = PCV13  * Pneumovax = PPSV23   Adults 25-60 years old: 1-3 doses may be recommended based on certain risk factors  Adults 72 years old: Prevnar (PCV13) vaccine recommended followed by Pneumovax (PPSV23) vaccine  If already received PPSV23 since turning 65, then PCV13 recommended at least one year after PPSV23 dose  Hepatitis B Vaccine 3 dose series if at intermediate or high risk (ex: diabetes, end stage renal disease, liver disease)   Tetanus (Td) Vaccine - COST NOT COVERED BY MEDICARE PART B Following completion of primary series, a booster dose should be given every 10 years to maintain immunity against tetanus  Td may also be given as tetanus wound prophylaxis  Tdap Vaccine - COST NOT COVERED BY MEDICARE PART B Recommended at least once for all adults  For pregnant patients, recommended with each pregnancy  Shingles Vaccine (Shingrix) - COST NOT COVERED BY MEDICARE PART B  2 shot series recommended in those aged 48 and above     Health Maintenance Due:      Topic Date Due    HIV Screening  Never done    Colorectal Cancer Screening  Never done    MAMMOGRAM  01/13/2021    Hepatitis C Screening  Completed     Immunizations Due:      Topic Date Due    DTaP,Tdap,and Td Vaccines (1 - Tdap) Never done    Pneumococcal Vaccine: 65+ Years (1 of 1 - PPSV23) Never done    COVID-19 Vaccine (2 - Pfizer 2-dose series) 03/31/2021     Advance Directives   What are advance directives? Advance directives are legal documents that state your wishes and plans for medical care  These plans are made ahead of time in case you lose your ability to make decisions for yourself   Advance directives can apply to any medical decision, such as the treatments you want, and if you want to donate organs  What are the types of advance directives? There are many types of advance directives, and each state has rules about how to use them  You may choose a combination of any of the following:  · Living will: This is a written record of the treatment you want  You can also choose which treatments you do not want, which to limit, and which to stop at a certain time  This includes surgery, medicine, IV fluid, and tube feedings  · Durable power of  for healthcare [facility]): This is a written record that states who you want to make healthcare choices for you when you are unable to make them for yourself  This person, called a proxy, is usually a family member or a friend  You may choose more than 1 proxy  · Do not resuscitate (DNR) order:  A DNR order is used in case your heart stops beating or you stop breathing  It is a request not to have certain forms of treatment, such as CPR  A DNR order may be included in other types of advance directives  · Medical directive: This covers the care that you want if you are in a coma, near death, or unable to make decisions for yourself  You can list the treatments you want for each condition  Treatment may include pain medicine, surgery, blood transfusions, dialysis, IV or tube feedings, and a ventilator (breathing machine)  · Values history: This document has questions about your views, beliefs, and how you feel and think about life  This information can help others choose the care that you would choose  Why are advance directives important? An advance directive helps you control your care  Although spoken wishes may be used, it is better to have your wishes written down  Spoken wishes can be misunderstood, or not followed  Treatments may be given even if you do not want them  An advance directive may make it easier for your family to make difficult choices about your care     Weight Management   Why it is important to manage your weight:  Being overweight increases your risk of health conditions such as heart disease, high blood pressure, type 2 diabetes, and certain types of cancer  It can also increase your risk for osteoarthritis, sleep apnea, and other respiratory problems  Aim for a slow, steady weight loss  Even a small amount of weight loss can lower your risk of health problems  How to lose weight safely:  A safe and healthy way to lose weight is to eat fewer calories and get regular exercise  You can lose up about 1 pound a week by decreasing the number of calories you eat by 500 calories each day  Healthy meal plan for weight management:  A healthy meal plan includes a variety of foods, contains fewer calories, and helps you stay healthy  A healthy meal plan includes the following:  · Eat whole-grain foods more often  A healthy meal plan should contain fiber  Fiber is the part of grains, fruits, and vegetables that is not broken down by your body  Whole-grain foods are healthy and provide extra fiber in your diet  Some examples of whole-grain foods are whole-wheat breads and pastas, oatmeal, brown rice, and bulgur  · Eat a variety of vegetables every day  Include dark, leafy greens such as spinach, kale, carey greens, and mustard greens  Eat yellow and orange vegetables such as carrots, sweet potatoes, and winter squash  · Eat a variety of fruits every day  Choose fresh or canned fruit (canned in its own juice or light syrup) instead of juice  Fruit juice has very little or no fiber  · Eat low-fat dairy foods  Drink fat-free (skim) milk or 1% milk  Eat fat-free yogurt and low-fat cottage cheese  Try low-fat cheeses such as mozzarella and other reduced-fat cheeses  · Choose meat and other protein foods that are low in fat  Choose beans or other legumes such as split peas or lentils  Choose fish, skinless poultry (chicken or turkey), or lean cuts of red meat (beef or pork)   Before you cook meat or poultry, cut off any visible fat  · Use less fat and oil  Try baking foods instead of frying them  Add less fat, such as margarine, sour cream, regular salad dressing and mayonnaise to foods  Eat fewer high-fat foods  Some examples of high-fat foods include french fries, doughnuts, ice cream, and cakes  · Eat fewer sweets  Limit foods and drinks that are high in sugar  This includes candy, cookies, regular soda, and sweetened drinks  Exercise:  Exercise at least 30 minutes per day on most days of the week  Some examples of exercise include walking, biking, dancing, and swimming  You can also fit in more physical activity by taking the stairs instead of the elevator or parking farther away from stores  Ask your healthcare provider about the best exercise plan for you  © Copyright [employer] 2018 Information is for End User's use only and may not be sold, redistributed or otherwise used for commercial purposes   All illustrations and images included in CareNotes® are the copyrighted property of [employer]  or [address] [place] [address]

## 2021-05-24 ENCOUNTER — TELEPHONE (OUTPATIENT)
Dept: FAMILY MEDICINE CLINIC | Facility: HOSPITAL | Age: 66
End: 2021-05-24

## 2021-05-24 DIAGNOSIS — R93.89 ABNORMAL CXR: Primary | ICD-10-CM

## 2021-05-27 ENCOUNTER — TELEPHONE (OUTPATIENT)
Dept: FAMILY MEDICINE CLINIC | Facility: HOSPITAL | Age: 66
End: 2021-05-27

## 2021-05-27 ENCOUNTER — LAB (OUTPATIENT)
Dept: LAB | Facility: HOSPITAL | Age: 66
End: 2021-05-27
Attending: INTERNAL MEDICINE
Payer: COMMERCIAL

## 2021-05-27 DIAGNOSIS — E11.65 TYPE 2 DIABETES MELLITUS WITH HYPERGLYCEMIA, WITHOUT LONG-TERM CURRENT USE OF INSULIN (HCC): ICD-10-CM

## 2021-05-27 LAB
ALBUMIN SERPL BCP-MCNC: 3.8 G/DL (ref 3.5–5)
ALP SERPL-CCNC: 86 U/L (ref 46–116)
ALT SERPL W P-5'-P-CCNC: 28 U/L (ref 12–78)
ANION GAP SERPL CALCULATED.3IONS-SCNC: 5 MMOL/L (ref 4–13)
AST SERPL W P-5'-P-CCNC: 6 U/L (ref 5–45)
BASOPHILS # BLD AUTO: 0.06 THOUSANDS/ΜL (ref 0–0.1)
BASOPHILS NFR BLD AUTO: 1 % (ref 0–1)
BILIRUB SERPL-MCNC: 0.74 MG/DL (ref 0.2–1)
BUN SERPL-MCNC: 14 MG/DL (ref 5–25)
CALCIUM SERPL-MCNC: 9.7 MG/DL (ref 8.3–10.1)
CHLORIDE SERPL-SCNC: 107 MMOL/L (ref 100–108)
CHOLEST SERPL-MCNC: 185 MG/DL (ref 50–200)
CO2 SERPL-SCNC: 29 MMOL/L (ref 21–32)
CREAT SERPL-MCNC: 0.51 MG/DL (ref 0.6–1.3)
CREAT UR-MCNC: 146 MG/DL
EOSINOPHIL # BLD AUTO: 0.18 THOUSAND/ΜL (ref 0–0.61)
EOSINOPHIL NFR BLD AUTO: 2 % (ref 0–6)
ERYTHROCYTE [DISTWIDTH] IN BLOOD BY AUTOMATED COUNT: 12.3 % (ref 11.6–15.1)
EST. AVERAGE GLUCOSE BLD GHB EST-MCNC: 206 MG/DL
GFR SERPL CREATININE-BSD FRML MDRD: 101 ML/MIN/1.73SQ M
GLUCOSE P FAST SERPL-MCNC: 161 MG/DL (ref 65–99)
HBA1C MFR BLD: 8.8 %
HCT VFR BLD AUTO: 48.3 % (ref 34.8–46.1)
HDLC SERPL-MCNC: 41 MG/DL
HGB BLD-MCNC: 15.9 G/DL (ref 11.5–15.4)
IMM GRANULOCYTES # BLD AUTO: 0.02 THOUSAND/UL (ref 0–0.2)
IMM GRANULOCYTES NFR BLD AUTO: 0 % (ref 0–2)
LDLC SERPL CALC-MCNC: 117 MG/DL (ref 0–100)
LYMPHOCYTES # BLD AUTO: 2.55 THOUSANDS/ΜL (ref 0.6–4.47)
LYMPHOCYTES NFR BLD AUTO: 33 % (ref 14–44)
MCH RBC QN AUTO: 30.4 PG (ref 26.8–34.3)
MCHC RBC AUTO-ENTMCNC: 32.9 G/DL (ref 31.4–37.4)
MCV RBC AUTO: 92 FL (ref 82–98)
MICROALBUMIN UR-MCNC: 23.2 MG/L (ref 0–20)
MICROALBUMIN/CREAT 24H UR: 16 MG/G CREATININE (ref 0–30)
MONOCYTES # BLD AUTO: 0.49 THOUSAND/ΜL (ref 0.17–1.22)
MONOCYTES NFR BLD AUTO: 6 % (ref 4–12)
NEUTROPHILS # BLD AUTO: 4.36 THOUSANDS/ΜL (ref 1.85–7.62)
NEUTS SEG NFR BLD AUTO: 58 % (ref 43–75)
NRBC BLD AUTO-RTO: 0 /100 WBCS
PLATELET # BLD AUTO: 258 THOUSANDS/UL (ref 149–390)
PMV BLD AUTO: 10.2 FL (ref 8.9–12.7)
POTASSIUM SERPL-SCNC: 3.8 MMOL/L (ref 3.5–5.3)
PROT SERPL-MCNC: 6.9 G/DL (ref 6.4–8.2)
RBC # BLD AUTO: 5.23 MILLION/UL (ref 3.81–5.12)
SODIUM SERPL-SCNC: 141 MMOL/L (ref 136–145)
TRIGL SERPL-MCNC: 137 MG/DL
WBC # BLD AUTO: 7.66 THOUSAND/UL (ref 4.31–10.16)

## 2021-05-27 PROCEDURE — 80061 LIPID PANEL: CPT

## 2021-05-27 PROCEDURE — 83036 HEMOGLOBIN GLYCOSYLATED A1C: CPT

## 2021-05-27 PROCEDURE — 82570 ASSAY OF URINE CREATININE: CPT | Performed by: INTERNAL MEDICINE

## 2021-05-27 PROCEDURE — 80053 COMPREHEN METABOLIC PANEL: CPT

## 2021-05-27 PROCEDURE — 3052F HG A1C>EQUAL 8.0%<EQUAL 9.0%: CPT | Performed by: PHYSICIAN ASSISTANT

## 2021-05-27 PROCEDURE — 36415 COLL VENOUS BLD VENIPUNCTURE: CPT

## 2021-05-27 PROCEDURE — 85025 COMPLETE CBC W/AUTO DIFF WBC: CPT

## 2021-05-27 PROCEDURE — 82043 UR ALBUMIN QUANTITATIVE: CPT | Performed by: INTERNAL MEDICINE

## 2021-05-27 PROCEDURE — 3061F NEG MICROALBUMINURIA REV: CPT | Performed by: PHYSICIAN ASSISTANT

## 2021-05-28 DIAGNOSIS — E11.65 TYPE 2 DIABETES MELLITUS WITH HYPERGLYCEMIA, WITHOUT LONG-TERM CURRENT USE OF INSULIN (HCC): Primary | ICD-10-CM

## 2021-06-01 ENCOUNTER — TELEPHONE (OUTPATIENT)
Dept: FAMILY MEDICINE CLINIC | Facility: HOSPITAL | Age: 66
End: 2021-06-01

## 2021-06-01 NOTE — TELEPHONE ENCOUNTER
Pt returned Cindys call she wanted to give Dr Adrian Charles:    In reference to her recent HgA1c result pt does not want to go on the Metformin, she will be following up with a [facility] nutritionist, and exercise  She also is going for the CT    DD

## 2021-06-02 ENCOUNTER — OFFICE VISIT (OUTPATIENT)
Dept: FAMILY MEDICINE CLINIC | Facility: HOSPITAL | Age: 66
End: 2021-06-02
Payer: COMMERCIAL

## 2021-06-02 VITALS
HEIGHT: 62 IN | TEMPERATURE: 98 F | HEART RATE: 74 BPM | WEIGHT: 181.2 LBS | OXYGEN SATURATION: 96 % | DIASTOLIC BLOOD PRESSURE: 90 MMHG | BODY MASS INDEX: 33.34 KG/M2 | SYSTOLIC BLOOD PRESSURE: 150 MMHG

## 2021-06-02 DIAGNOSIS — E11.65 TYPE 2 DIABETES MELLITUS WITH HYPERGLYCEMIA, WITHOUT LONG-TERM CURRENT USE OF INSULIN (HCC): Primary | ICD-10-CM

## 2021-06-02 DIAGNOSIS — I10 ESSENTIAL HYPERTENSION: ICD-10-CM

## 2021-06-02 DIAGNOSIS — J01.91 ACUTE RECURRENT SINUSITIS, UNSPECIFIED LOCATION: Primary | ICD-10-CM

## 2021-06-02 PROCEDURE — 99214 OFFICE O/P EST MOD 30 MIN: CPT | Performed by: PHYSICIAN ASSISTANT

## 2021-06-02 PROCEDURE — 3008F BODY MASS INDEX DOCD: CPT | Performed by: PHYSICIAN ASSISTANT

## 2021-06-02 PROCEDURE — 3080F DIAST BP >= 90 MM HG: CPT | Performed by: PHYSICIAN ASSISTANT

## 2021-06-02 PROCEDURE — 1036F TOBACCO NON-USER: CPT | Performed by: PHYSICIAN ASSISTANT

## 2021-06-02 PROCEDURE — 3077F SYST BP >= 140 MM HG: CPT | Performed by: PHYSICIAN ASSISTANT

## 2021-06-02 RX ORDER — TRIAMTERENE AND HYDROCHLOROTHIAZIDE 37.5; 25 MG/1; MG/1
1 TABLET ORAL DAILY
Qty: 30 TABLET | Refills: 3 | Status: ON HOLD | OUTPATIENT
Start: 2021-06-02 | End: 2021-10-12 | Stop reason: ALTCHOICE

## 2021-06-02 RX ORDER — CEFUROXIME AXETIL 250 MG/1
250 TABLET ORAL 2 TIMES DAILY WITH MEALS
Qty: 20 TABLET | Refills: 0 | Status: SHIPPED | OUTPATIENT
Start: 2021-06-02 | End: 2021-06-12

## 2021-06-02 NOTE — PROGRESS NOTES
Assessment/Plan:           Problem List Items Addressed This Visit        Cardiovascular and Mediastinum    Hypertension (Chronic)- not controlled,  Recommend continuing to monitor blood pressure and   Returning for follow up in 4 weeks  Relevant Medications    triamterene-hydrochlorothiazide (MAXZIDE-25) 37 5-25 mg per tablet      Other Visit Diagnoses     Acute recurrent sinusitis, unspecified location    -  Primary    Relevant Medications    cefuroxime (CEFTIN) 250 mg tablet- continue saline   Nasal flushes, and increase fluid intake, until sx  Resolve  Subjective:      Patient ID: Sherron Chua is a 73 y o  female  Presents with c/o ear pain, bilaterally, past 5 days and cough  Also lymph/neck soreness  Monitors blood pressure:  129/89 this morning, fluctuating  Symptoms worse at night  Does a great deal of hiking and outdoor activities, tried otc allergy meds  Using saline nasal sprays, but has post nasal drip  Review of Systems   Constitutional: Negative for chills, diaphoresis, fatigue and fever  HENT: Positive for congestion, ear pain, postnasal drip and rhinorrhea  Negative for sore throat  Respiratory: Positive for cough  Negative for chest tightness and shortness of breath  Cardiovascular: Positive for leg swelling  Negative for chest pain and palpitations  Slight leg swelling noted  Objective:      /90   Pulse 74   Temp 98 °F (36 7 °C) (Tympanic)   Ht 5' 2" (1 575 m)   Wt 82 2 kg (181 lb 3 2 oz)   LMP  (LMP Unknown)   SpO2 96%   BMI 33 14 kg/m²          Physical Exam  Vitals signs and nursing note reviewed  Constitutional:       General: She is not in acute distress  Appearance: She is obese  She is not ill-appearing, toxic-appearing or diaphoretic  HENT:      Head: Normocephalic and atraumatic  Right Ear: Ear canal and external ear normal  There is no impacted cerumen        Left Ear: Ear canal and external ear normal  There is no impacted cerumen  Ears:      Comments: TM bulging, with congestion noted in middle ears  Nose: Congestion present  No rhinorrhea  Mouth/Throat:      Mouth: Mucous membranes are moist       Pharynx: No oropharyngeal exudate or posterior oropharyngeal erythema  Eyes:      General: No scleral icterus  Right eye: No discharge  Left eye: No discharge  Extraocular Movements: Extraocular movements intact  Conjunctiva/sclera: Conjunctivae normal    Pulmonary:      Effort: Pulmonary effort is normal  No respiratory distress  Breath sounds: Normal breath sounds  No stridor  No wheezing or rhonchi  Musculoskeletal: Normal range of motion  General: No swelling or tenderness  Right lower leg: Edema present  Left lower leg: Edema present  Comments: Slight swelling noted lower ext  Neurological:      General: No focal deficit present  Mental Status: She is alert and oriented to person, place, and time  Cranial Nerves: No cranial nerve deficit  Sensory: No sensory deficit  Motor: No weakness        Coordination: Coordination normal

## 2021-06-02 NOTE — PATIENT INSTRUCTIONS
Start Ceftin for sinus congestion/infection  Start Maxzide daily, and continue to monitor blood pressure, daily

## 2021-06-08 ENCOUNTER — HOSPITAL ENCOUNTER (OUTPATIENT)
Dept: CT IMAGING | Facility: HOSPITAL | Age: 66
Discharge: HOME | End: 2021-06-08
Attending: INTERNAL MEDICINE
Payer: COMMERCIAL

## 2021-06-08 DIAGNOSIS — R93.89 ABNORMAL CXR: ICD-10-CM

## 2021-06-08 PROCEDURE — G1004 CDSM NDSC: HCPCS

## 2021-06-08 PROCEDURE — 71250 CT THORAX DX C-: CPT

## 2021-06-15 DIAGNOSIS — R91.1 LUNG NODULE: ICD-10-CM

## 2021-06-15 DIAGNOSIS — E04.2 MULTIPLE THYROID NODULES: Primary | ICD-10-CM

## 2021-06-16 ENCOUNTER — TELEPHONE (OUTPATIENT)
Dept: FAMILY MEDICINE CLINIC | Facility: HOSPITAL | Age: 66
End: 2021-06-16

## 2021-06-24 ENCOUNTER — HOSPITAL ENCOUNTER (OUTPATIENT)
Dept: ULTRASOUND IMAGING | Facility: HOSPITAL | Age: 66
Discharge: HOME | End: 2021-06-24
Attending: INTERNAL MEDICINE
Payer: COMMERCIAL

## 2021-06-24 DIAGNOSIS — E04.2 MULTIPLE THYROID NODULES: ICD-10-CM

## 2021-06-24 PROCEDURE — 76536 US EXAM OF HEAD AND NECK: CPT

## 2021-07-02 ENCOUNTER — TELEPHONE (OUTPATIENT)
Dept: FAMILY MEDICINE CLINIC | Facility: HOSPITAL | Age: 66
End: 2021-07-02

## 2021-07-02 NOTE — TELEPHONE ENCOUNTER
Pt states she finished abx on e week ago for sinus infection  Top of head feels "full", dizzy, woozy feeling  /99, 176/93, 146/89  Did not start new BP med due to abx  Please advise, pt asking if ov needed or start bp med

## 2021-07-02 NOTE — TELEPHONE ENCOUNTER
Patient called state her Blood Pressure has been high and she has been feeling Dizzy  Patient was prescribed a new medication but has not started because she was on antibiotic but has since finished the antibiotic  Patient is also complaining of a stiff neck    Doesn't fell that she needs to go to the ER    No SOB, No Chest Pain  PCB

## 2021-07-02 NOTE — TELEPHONE ENCOUNTER
Inform patient she should have started blood pressure medication right away, and she should continue to monitor her pressure and her sx

## 2021-07-06 ENCOUNTER — TELEPHONE (OUTPATIENT)
Dept: FAMILY MEDICINE CLINIC | Facility: HOSPITAL | Age: 66
End: 2021-07-06

## 2021-07-06 NOTE — TELEPHONE ENCOUNTER
So Allred called from [facility]) Procedure scheduling  They need the order for this patients thyroid biopsy changed  It has to be ordered as Ultrasound Guided Thyroid Biopsy, not interventional radiology  Also if you want Afirma testing done with the biopsy you must select the order that says with Afirma testing in the test description  Once you select the proper order, they need you to specify in the description of the order, which nodules you want biopsied  You must include the location and size of the nodule or nodules you want tested  If you have any questions So Allred can be reached at [phone]  Once you have finished the order, please let me know so I can notify So Allred so she can get the patient scheduled

## 2021-07-07 NOTE — TELEPHONE ENCOUNTER
I have tried to contact Ranjit several times at the number she provided  No answer and there is no voicemail  Will relay message if she calls back

## 2021-07-08 ENCOUNTER — TELEPHONE (OUTPATIENT)
Dept: FAMILY MEDICINE CLINIC | Facility: HOSPITAL | Age: 66
End: 2021-07-08

## 2021-07-08 NOTE — TELEPHONE ENCOUNTER
Spoke with Ralene Moritz, I explained that I received a call from [facility] regarding changing the biopsy order  I tried many times yesterday to reach [facility] to let her know that the order was corrected so she can contact the patient to schedule  I was not able to reach her and there was no voicemail  I advised the patient to call central scheduling to see if they can help her get this scheduled

## 2021-07-20 ENCOUNTER — HOSPITAL ENCOUNTER (OUTPATIENT)
Dept: ULTRASOUND IMAGING | Facility: HOSPITAL | Age: 66
Discharge: HOME | End: 2021-07-20
Attending: INTERNAL MEDICINE | Admitting: RADIOLOGY
Payer: COMMERCIAL

## 2021-07-20 DIAGNOSIS — E04.2 MULTIPLE THYROID NODULES: ICD-10-CM

## 2021-07-20 PROCEDURE — 88173 CYTOPATH EVAL FNA REPORT: CPT | Performed by: PATHOLOGY

## 2021-07-20 PROCEDURE — 88333 PATH CONSLTJ SURG CYTO XM 1: CPT | Performed by: PATHOLOGY

## 2021-07-20 PROCEDURE — 88172 CYTP DX EVAL FNA 1ST EA SITE: CPT | Performed by: PATHOLOGY

## 2021-07-20 PROCEDURE — 10005 FNA BX W/US GDN 1ST LES: CPT

## 2021-07-20 RX ORDER — LIDOCAINE HYDROCHLORIDE 10 MG/ML
5 INJECTION, SOLUTION EPIDURAL; INFILTRATION; INTRACAUDAL; PERINEURAL ONCE
Status: COMPLETED | OUTPATIENT
Start: 2021-07-20 | End: 2021-07-20

## 2021-07-20 RX ADMIN — LIDOCAINE HYDROCHLORIDE 2.5 ML: 10 INJECTION, SOLUTION EPIDURAL; INFILTRATION; INTRACAUDAL; PERINEURAL at 14:20

## 2021-07-23 ENCOUNTER — OFFICE VISIT (OUTPATIENT)
Dept: FAMILY MEDICINE CLINIC | Facility: HOSPITAL | Age: 66
End: 2021-07-23
Payer: COMMERCIAL

## 2021-07-23 VITALS
SYSTOLIC BLOOD PRESSURE: 160 MMHG | DIASTOLIC BLOOD PRESSURE: 90 MMHG | OXYGEN SATURATION: 97 % | BODY MASS INDEX: 33.29 KG/M2 | WEIGHT: 182 LBS

## 2021-07-23 DIAGNOSIS — I10 ESSENTIAL HYPERTENSION: ICD-10-CM

## 2021-07-23 DIAGNOSIS — E11.9 TYPE 2 DIABETES MELLITUS WITHOUT COMPLICATION, WITHOUT LONG-TERM CURRENT USE OF INSULIN (HCC): ICD-10-CM

## 2021-07-23 DIAGNOSIS — J30.9 CHRONIC ALLERGIC RHINITIS: ICD-10-CM

## 2021-07-23 DIAGNOSIS — E04.2 MULTIPLE THYROID NODULES: Primary | ICD-10-CM

## 2021-07-23 DIAGNOSIS — R89.9 ABNORMAL THYROID BIOPSY: ICD-10-CM

## 2021-07-23 PROCEDURE — 1036F TOBACCO NON-USER: CPT | Performed by: PHYSICIAN ASSISTANT

## 2021-07-23 PROCEDURE — 99214 OFFICE O/P EST MOD 30 MIN: CPT | Performed by: PHYSICIAN ASSISTANT

## 2021-07-23 PROCEDURE — 3077F SYST BP >= 140 MM HG: CPT | Performed by: PHYSICIAN ASSISTANT

## 2021-07-23 PROCEDURE — 3080F DIAST BP >= 90 MM HG: CPT | Performed by: PHYSICIAN ASSISTANT

## 2021-07-23 NOTE — PATIENT INSTRUCTIONS
Recommend claritin for allergies, and ENT evaluation  Will refer to oncology about thyroid nodule  Continue to monitor blood pressure and glucose

## 2021-07-23 NOTE — PROGRESS NOTES
Assessment/Plan:    HTN- with fluctuating blood pressure, patient to continue monitoring  DM- type 2-  Fluctuating glucose, patient refusing to take meds  At this time, discussed long term complications of DM, patient   Verbalized understanding, recommend patient continue to monitor   Glucose, and to repeat blood test in 2-3 months  Problem List Items Addressed This Visit        Endocrine    Multiple thyroid nodules - Primary    Relevant Orders    Ambulatory Referral to Otolaryngology      Other Visit Diagnoses     Abnormal thyroid biopsy   - abnormal findings, discussed with pt  Relevant Orders    Ambulatory referral to Surgical Oncology    Ambulatory Referral to Otolaryngology    Chronic allergic rhinitis        Relevant Orders    Ambulatory Referral to Otolaryngology            Subjective:      Patient ID: Mikayla Thurston is a 73 y o  female  Presents for follow up  Has not been using allergy meds, due to side effects  Has been monitoring blood pressure and glucose  Blood pressure:  128/83, 127/82, 136/89, 131/75, 132/77  Has allergic rhinitis, worse when season's change  Recently had thyroid biopsy  Glucose-  Ranges 145-138-177, 121- 194  Did not start Metformin  Plans to see nutritionist; mother was diabetic, understands the consequences  Review of Systems   Constitutional: Negative for chills, diaphoresis, fatigue and fever  HENT: Positive for congestion, ear pain, postnasal drip and rhinorrhea  Negative for sore throat and trouble swallowing  Respiratory: Negative for cough, chest tightness and shortness of breath  Neurological: Positive for dizziness and light-headedness  Objective:      /90 (BP Location: Left arm, Patient Position: Sitting, Cuff Size: Standard)   Wt 82 6 kg (182 lb)   LMP  (LMP Unknown)   SpO2 97%   BMI 33 29 kg/m²          Physical Exam  Vitals and nursing note reviewed     Neck:      Comments: Large nodule, with needle aida noted, right thyroid area  Cardiovascular:      Rate and Rhythm: Normal rate and regular rhythm  Pulses: Normal pulses  Heart sounds: Normal heart sounds  Pulmonary:      Effort: Pulmonary effort is normal  No respiratory distress  Breath sounds: Normal breath sounds  No stridor  No wheezing or rhonchi  Musculoskeletal:         General: No swelling, tenderness, deformity or signs of injury  Normal range of motion  Cervical back: Normal range of motion  Right lower leg: No edema  Left lower leg: No edema  Lymphadenopathy:      Cervical: No cervical adenopathy  Neurological:      General: No focal deficit present  Mental Status: She is alert and oriented to person, place, and time  Cranial Nerves: No cranial nerve deficit  Sensory: No sensory deficit  Motor: No weakness  Coordination: Coordination normal    Psychiatric:         Mood and Affect: Mood normal          Behavior: Behavior normal          Thought Content:  Thought content normal          Judgment: Judgment normal

## 2021-07-28 ENCOUNTER — TELEPHONE (OUTPATIENT)
Dept: HEMATOLOGY ONCOLOGY | Facility: CLINIC | Age: 66
End: 2021-07-28

## 2021-07-28 NOTE — TELEPHONE ENCOUNTER
Patient is holding off to schedule at this moment, looking at other facilities   Patient would like to do research first

## 2021-08-05 ENCOUNTER — HOSPITAL ENCOUNTER (OUTPATIENT)
Dept: BONE DENSITY | Facility: IMAGING CENTER | Age: 66
Discharge: HOME | End: 2021-08-05
Payer: COMMERCIAL

## 2021-08-05 ENCOUNTER — HOSPITAL ENCOUNTER (OUTPATIENT)
Dept: MAMMOGRAPHY | Facility: IMAGING CENTER | Age: 66
Discharge: HOME | End: 2021-08-05
Payer: COMMERCIAL

## 2021-08-05 VITALS — HEIGHT: 62 IN | WEIGHT: 182 LBS | BODY MASS INDEX: 33.49 KG/M2

## 2021-08-05 DIAGNOSIS — E89.40 POSTSURGICAL OVARIAN FAILURE: ICD-10-CM

## 2021-08-05 DIAGNOSIS — Z12.31 ENCOUNTER FOR SCREENING MAMMOGRAM FOR MALIGNANT NEOPLASM OF BREAST: ICD-10-CM

## 2021-08-05 PROCEDURE — 77067 SCR MAMMO BI INCL CAD: CPT

## 2021-08-05 PROCEDURE — 77080 DXA BONE DENSITY AXIAL: CPT

## 2021-08-05 PROCEDURE — 77063 BREAST TOMOSYNTHESIS BI: CPT

## 2021-08-09 NOTE — PROGRESS NOTES
Telephone Call Documentation    Namita Ramos       1955             spoke with patient on the phone-she is waiting for a call back from Oncology Surgical team about the thyroid nodules  I have also encouraged her to follow through with was recommended for endocrinology all also for an appointment  She reports she was having increased swelling using triamterene for blood pressure control and she is working with a nutritionist and exercising-her blood pressures are now in the 130s over 80s so I am okay with holding blood pressure meds at present but encouraged her to continue to follow this closely   she did see shiela  last month and her blood sugars were still running somewhat high in the 120s to 130    I did  Previously sent in Rx for metformin 500 mg b i d  but she had not started that-will have her start 1 daily and tracker sugars and notify us if they are not improved

## 2021-08-16 NOTE — TELEPHONE ENCOUNTER
New Patient Request   Patient Details:     Syd Lopes      1955      [id number]      Reason for Appointment   Who is calling to schedule? Patient    If not Patient, what is their name? What is the diagnosis? Abnormal Thyroid Biopsy   Who is the referring doctor? Jose Lawrence   Scheduling Information   Which department are you scheduling with ? Surgical Oncology    Preferred [facility]    Best Number to call back on? If calling from the [facility], use the Nurse number   [phone]   Miscellaneous Information: Dr West    Please advise the patient, a new patient  will be calling them back within 1 business day

## 2021-08-17 ENCOUNTER — TELEPHONE (OUTPATIENT)
Dept: HEMATOLOGY ONCOLOGY | Facility: CLINIC | Age: 66
End: 2021-08-17

## 2021-08-17 NOTE — TELEPHONE ENCOUNTER
New Patient Encounter    New Patient Intake Form   Patient Details:  Gena Reyes  1955  [id number]    Background Information:  [address] starts by opening a telephone encounter and gathering the following information   Who is calling to schedule? If not self, relationship to patient? Patient   Referring Provider Good Alonso   What is the diagnosis? 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 Non-Cancer   Is this diagnosis confirmed? Yes   When was the diagnosis? 7/2021   Is there a confirmed diagnosis from a biopsy/tissue reviewed by pathology? Yes   Were outside slides requested? No   Is patient aware of diagnosis? Yes   Is there a personal history and what kind? Melanoma 15 yrs ago, cervical cancer s/p hysterectomy all at [facility], pt will request records be faxed  Is there a family history and what kind? Multiple lung cancers, colon   Reason for visit? New Diagnosis   Have you had any imaging or labs done? If so: when, where? yes  [facility]   Are records in [facility]? yes   Are records needed form an outside facility? Yes   If yes, name, city, state where facility is located  [facility]   If patient has a prior history of cancer were old records obtained? Pt is requesting records be faxed to my attn   Was the patient told to bring a disk? No   Does the patient smoke or Vape? No   If yes, how many packs or cartridges per day? Scheduling Information:   Preferred [facility]:  [address] [place] [address]  Requesting only Dr Paradise Simon   Are there any dates/time the patient cannot be seen? M- W- F   Miscellaneous: all current records in [facility]  Pt will attempt to obtain records from prior dx's 15 or more yrs ago  After completing the above information, please route to Financial Counselor and the appropriate Nurse Navigator for review

## 2021-09-03 ENCOUNTER — CONSULT (OUTPATIENT)
Dept: SURGICAL ONCOLOGY | Facility: CLINIC | Age: 66
End: 2021-09-03
Payer: COMMERCIAL

## 2021-09-03 ENCOUNTER — OFFICE VISIT (OUTPATIENT)
Dept: PODIATRY | Facility: CLINIC | Age: 66
End: 2021-09-03
Payer: COMMERCIAL

## 2021-09-03 VITALS
DIASTOLIC BLOOD PRESSURE: 79 MMHG | WEIGHT: 182 LBS | BODY MASS INDEX: 33.49 KG/M2 | HEART RATE: 77 BPM | SYSTOLIC BLOOD PRESSURE: 168 MMHG | HEIGHT: 62 IN

## 2021-09-03 VITALS
SYSTOLIC BLOOD PRESSURE: 176 MMHG | WEIGHT: 182 LBS | RESPIRATION RATE: 18 BRPM | OXYGEN SATURATION: 97 % | HEART RATE: 80 BPM | BODY MASS INDEX: 33.49 KG/M2 | DIASTOLIC BLOOD PRESSURE: 82 MMHG | TEMPERATURE: 98 F | HEIGHT: 62 IN

## 2021-09-03 DIAGNOSIS — R89.9 ABNORMAL THYROID BIOPSY: ICD-10-CM

## 2021-09-03 DIAGNOSIS — L60.8 PINCER NAIL DEFORMITY: Primary | ICD-10-CM

## 2021-09-03 DIAGNOSIS — L60.0 INGROWING NAIL: ICD-10-CM

## 2021-09-03 DIAGNOSIS — E04.2 MULTIPLE THYROID NODULES: Primary | ICD-10-CM

## 2021-09-03 PROCEDURE — 99212 OFFICE O/P EST SF 10 MIN: CPT | Performed by: PODIATRIST

## 2021-09-03 PROCEDURE — 99204 OFFICE O/P NEW MOD 45 MIN: CPT | Performed by: SURGERY

## 2021-09-03 PROCEDURE — 3008F BODY MASS INDEX DOCD: CPT | Performed by: PHYSICIAN ASSISTANT

## 2021-09-03 RX ORDER — TRAMADOL HYDROCHLORIDE 50 MG/1
50 TABLET ORAL EVERY 6 HOURS PRN
Qty: 10 TABLET | Refills: 0 | Status: SHIPPED | OUTPATIENT
Start: 2021-09-03

## 2021-09-03 NOTE — LETTER
September 3, 2021     JESUS Jones  [address] [address]    Patient: Regino Mary   YOB: 1955   Date of Visit: 9/3/2021       Dear Dr Rodriguez Sports: Thank you for referring Rashaun Andrews to me for evaluation  Below are my notes for this consultation  If you have questions, please do not hesitate to call me  I look forward to following your patient along with you  Sincerely,        Heraclio Roy MD        CC: DO Terri Prado CRNP Hunter Melena, MD  9/3/2021  3:46 PM  Sign when Signing Visit               Surgical Oncology Follow Up       [address] [address]    Regino Mary  1955  [id number]  [facility] SURGICAL ONCOLOGY [address] [address]    Chief Complaint   Patient presents with    Consult       Assessment/Plan:    No problem-specific Assessment & Plan notes found for this encounter  Diagnoses and all orders for this visit:    Multiple thyroid nodules    Abnormal thyroid biopsy  -     Ambulatory referral to Surgical Oncology        Advance Care Planning/Advance Directives:  Did discuss disease status, cancer treatment plans and/or cancer treatment goals with the patient  Oncology History    No history exists  History of Present Illness:   Patient is a 71-year-old woman with a longstanding history of right-sided thyroid nodule  This is been followed with multiple ultrasounds in the past   Most recently, the right-sided nodule was noted to have increased in size significantly to the point that biopsy was warranted  This was done which came back as [place] category 4  Afirma testing was done which came back benign  The nodule however, measured greater than 4 cm and she was subsequently referred for evaluation and treatment    She denies a history of radiation exposure to the head or neck area  No personal or family history of endocrine malignancies  Review of Systems   Constitutional: Negative  HENT: Negative  Negative for trouble swallowing and voice change  Eyes: Negative  Respiratory: Negative  Cardiovascular: Negative  Gastrointestinal: Negative  Endocrine: Negative  Genitourinary: Negative  Musculoskeletal: Negative  Skin: Negative  Allergic/Immunologic: Negative  Neurological: Negative  Hematological: Negative  Psychiatric/Behavioral: Negative  Patient Active Problem List   Diagnosis    GERD (gastroesophageal reflux disease)    Hypertension    Type 2 diabetes mellitus with hyperglycemia, without long-term current use of insulin ([facility] )    Dilated pancreatic duct    Duodenal ulcer    Multiple thyroid nodules    Right lower lobe pulmonary nodule    Osteoarthritis of right ankle and foot    Hammer toes of both feet    Class 1 obesity due to excess calories with serious comorbidity in adult    Hepatic steatosis    Pancreatic cyst    Chronic rhinitis    Malignant melanoma of neck ([facility] [address] )     Past Medical History:   Diagnosis Date    Acute GI bleeding 10/27/2018    Diabetes mellitus ([address] )     prediabetic     Duodenal ulcer 10/28/2018    Hypertension     Irritable bowel disease     Melanoma ([facility] )     Melena 10/27/2018    Added automatically from request for surgery [id number]    Vertigo      Past Surgical History:   Procedure Laterality Date    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    ESOPHAGOGASTRODUODENOSCOPY N/A 10/28/2018    Procedure: ESOPHAGOGASTRODUODENOSCOPY (EGD);   Surgeon: Miriam Miller MD;  Location:  MAIN OR;  Service: Gastroenterology    HYSTERECTOMY      [address]    OOPHORECTOMY Left     US GUIDED THYROID BIOPSY  7/20/2021     Family History   Problem Relation Age of Onset    Substance Abuse Father         alcohol    Alcohol abuse Father     No Known Problems Mother  No Known Problems Daughter     No Known Problems Maternal Grandmother     Colon cancer Maternal Grandfather         age unknown    No Known Problems Paternal Grandmother     No Known Problems Paternal Grandfather     No Known Problems Maternal Aunt     No Known Problems Paternal Aunt     No Known Problems Paternal Aunt     No Known Problems Paternal Aunt     No Known Problems Paternal Aunt     No Known Problems Paternal [de-identified]     Colon cancer Cousin         19's    Mental illness Neg Hx      Social History     Socioeconomic History    Marital status:      Spouse name: Not on file    Number of children: Not on file    Years of education: Not on file    Highest education level: Not on file   Occupational History    Not on file   Tobacco Use    Smoking status: Former Smoker     Packs/day: 1 00     Years: 10      Pack years: 10 00     Types: Cigarettes     Quit date: 1969     Years since quittin 8    Smokeless tobacco: Never Used   Vaping Use    Vaping Use: Never used   Substance and Sexual Activity    Alcohol use: No    Drug use: No    Sexual activity: Not Currently     Partners: Male   Other Topics Concern    Not on file   Social History Narrative    Lives alone---    Feels safe at home  Has dentures  No living will  Exercises daily--exercise bike and walking to dvd  Social Determinants of Health     Financial Resource Strain:     Difficulty of Paying Living Expenses:    Food Insecurity:     Worried About Running Out of Food in the Last Year:     [address] in the Last Year:    Transportation Needs: No Transportation Needs    Lack of Transportation (Medical): No    Lack of Transportation (Non-Medical):  No   Physical Activity:     Days of Exercise per Week:     Minutes of Exercise per Session:    Stress:     Feeling of Stress :    Social Connections:     Frequency of Communication with Friends and Family:     Frequency of Social Gatherings with Friends and Family:     Attends Mosque Services:     Active Member of Clubs or Organizations:     Attends Club or Organization Meetings:     Marital Status:    Intimate Partner Violence:     Fear of Current or Ex-Partner:     Emotionally Abused:     Physically Abused:     Sexually Abused:        Current Outpatient Medications:     acetaminophen (TYLENOL) 325 mg tablet, Take 650 mg by mouth every 6 (six) hours as needed for mild pain, Disp: , Rfl:     Ascorbic Acid (VITAMIN C) 100 MG tablet, Take 100 mg by mouth daily, Disp: , Rfl:     b complex vitamins capsule, Take 1 capsule by mouth daily, Disp: , Rfl:     Blood Glucose Monitoring Suppl (ONE TOUCH ULTRA 2) w/Device KIT, Testing 4 times daily, Disp: 1 each, Rfl: 0    Cholecalciferol (VITAMIN D3) 2000 units TABS, 1 daily, Disp: , Rfl:     glucosamine-chondroitin 500-400 MG tablet, Take 1 tablet by mouth 3 (three) times a day, Disp: , Rfl:     glucose blood (ONE TOUCH ULTRA TEST) test strip, Testing 4 times daily, Disp: 100 each, Rfl: 5    Lancets (ONETOUCH DELICA PLUS [id number]) MISC, , Disp: , Rfl:     magnesium 30 MG tablet, Take 250 mg by mouth daily , Disp: , Rfl:     Omega-3 Fatty Acids (FISH OIL) 1,000 mg, Take 1,000 mg by mouth daily, Disp: , Rfl:     omeprazole (PriLOSEC OTC) 20 MG tablet, 1 daily prn only, Disp: , Rfl:     Protein POWD, Take by mouth, Disp: , Rfl:     saccharomyces boulardii (FLORASTOR) 250 mg capsule, Take 250 mg by mouth 2 (two) times a day, Disp: , Rfl:     Turmeric 500 MG CAPS, Take by mouth 1 daily, Disp: , Rfl:     vitamin A 7500 UNIT capsule, Take 7,500 Units by mouth daily, Disp: , Rfl:     vitamin E, tocopherol, 1,000 units capsule, Take 1,000 Units by mouth daily, Disp: , Rfl:     metFORMIN (GLUCOPHAGE) 500 mg tablet, Take 1 tablet (500 mg total) by mouth 2 (two) times a day with meals (Patient not taking: Reported on 9/3/2021), Disp: 60 tablet, Rfl: 1   triamterene-hydrochlorothiazide (MAXZIDE-25) 37 5-25 mg per tablet, Take 1 tablet by mouth daily (Patient not taking: Reported on 9/3/2021), Disp: 30 tablet, Rfl: 3    Zoster Vac Recomb Adjuvanted (Shingrix) 50 MCG/0 5ML SUSR, Inject 2 Doses into a muscle see administration instructions 2 doses 2- 6 months apart (Patient not taking: Reported on 9/3/2021), Disp: 2 each, Rfl: 0  Allergies   Allergen Reactions    Prednisone Anxiety    Corticosteroids Anxiety     Vitals:    09/03/21 1454   BP: (!) 176/82   Pulse: 80   Resp: 18   Temp: 98 °F (36 7 °C)   SpO2: 97%       Physical Exam  Vitals reviewed  Constitutional:       Appearance: Normal appearance  HENT:      Head: Normocephalic and atraumatic  Right Ear: External ear normal       Left Ear: External ear normal    Eyes:      General: No scleral icterus  Neck:      Comments: Visible and palpable right-sided thyroid nodule  Cardiovascular:      Rate and Rhythm: Normal rate and regular rhythm  Heart sounds: Normal heart sounds  Pulmonary:      Effort: Pulmonary effort is normal       Breath sounds: Normal breath sounds  Abdominal:      General: Abdomen is flat  Palpations: Abdomen is soft  Musculoskeletal:         General: Normal range of motion  Cervical back: Normal range of motion and neck supple  No rigidity or tenderness  Lymphadenopathy:      Cervical: No cervical adenopathy  Skin:     General: Skin is warm and dry  Neurological:      General: No focal deficit present  Mental Status: She is alert and oriented to person, place, and time  Psychiatric:         Mood and Affect: Mood normal          Behavior: Behavior normal          Thought Content:  Thought content normal          Judgment: Judgment normal          Pathology:  Case Report   Non-gynecologic Cytology                          Case: [id number]                                   Authorizing Provider: Yenny Styles DO               Collected:           07/20/2021 1431               Ordering Location:     [facility]     Received:            07/20/2021 1519                                      [facility]                                                             Pathologist:           Atul Quinonez MD                                                                   Specimens:   A) - Thyroid, Right, mid                                                                             B) - Thyroid, Right, mid                                                                   Final Diagnosis   A -B  Thyroid, Right, mid (Thin-prep and smear preparations): 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 ([place] Category IV)  - See note  Numerous isolated oncocytes in the absence of colloid      Satisfactory for evaluation      Note:  (1) As reported in the [address] [place] [address] for Reporting Thyroid Cytopathology* this diagnostic category has demonstrated anywhere from 25-40% risk of malignancy being found in subsequent resections and/or FNA  This risk of malignancy is expected to change due to the usage of the surgical pathology diagnosis of non-invasive follicular thyroid neoplasm with papillary-like nuclear features (NIFTP)    The anticipated risk of malignancy secondary to NIFTP is 10-40%  The histologic follow-up of cases diagnosed as follicular neoplasm/suspicious for follicular neoplasm includes follicular adenoma, follicular carcinoma, and follicular variant of papillary thyroid carcinoma including the recently described indolent counterpart, NIFTP  The manual reports that the usual management following this diagnosis is genetic testing or lobectomy   Ultimately, clinical/imaging correlation for this patient is needed in arriving at the actual management plan      *The [place] System for Reporting Thyroid Cytopathology, Julianne Fowler (Vicki Mills ), 2018 (2nd ed )    Electronically signed by Atul Quinonez MD on 7/22/2021 at 10:46 AM   Note    The treating physician has requested a sample(s) from the above thyroid nodule(s) be sent for analysis by  [facility] Gene Expression  ([facility]), performed by Olvin Cao [place], [place])  [facility] only performs this test on specimen(s) receiving a cytologic diagnosis of [place] Category III or  IV  If such a diagnosis is rendered (above) specimen will be sent to THE [facility] [place], and a separate report with  results of [facility] will follow directly from [facility] (typically taking 14 days)  If a cytologic  interpretation other than [place] category III or IV is rendered, specimen will not be sent for [facility]  Labs:  Lab Results   Component Value Date    [id number] 1 811 10/27/2018         Imaging    THYROID ULTRASOUND     INDICATION:    E04 2: Nontoxic multinodular goiter      COMPARISON:  Thyroid sonogram from July 31, 2014      TECHNIQUE:   Ultrasound of the thyroid was performed with a high frequency linear transducer in transverse and sagittal planes including volumetric imaging sweeps as well as traditional still imaging technique      FINDINGS:  Normal homogeneous smooth echotexture      Right lobe:  5 5 x 3 8 x 4 4 cm   44 2  Left lobe:  4 5 x 1 5 x 1 2 cm   3 9  Isthmus:  0 4 cm      Nodule #1  Image 4  RIGHT midlobe nodule measuring 4 5 x 3 8 x 4 2 cm  This nodule has signficantly increased in size from prior  COMPOSITION:  2 points, solid or almost completely solid   ECHOGENICITY:  1 point, isoechoic  SHAPE:  0 points, wider-than-tall  MARGIN: 0 points, smooth  ECHOGENIC FOCI:  0 points, none  TI-RADS Score: TR 3 (3 points),  FNA if >2 5 cm  Follow if >1 5 cm      Nodule #2  Image 27  LEFT upper pole nodule measuring 2 0 x 1 5 x 1 4 cm  Given differences in measuring technique, no significant change from prior  COMPOSITION:  2 points, solid or almost completely solid   ECHOGENICITY:  2 points, hypoechoic      SHAPE:  0 points, wider-than-tall  MARGIN: 0 points, smooth  ECHOGENIC FOCI:  1 point, single 2 mm macrocalcification  TI-RADS Score: TR 4 (4-6 points),  FNA if > 1 5 cm  Follow if > 1cm  This nodule has been stable for greater than 5 years and does not require additional follow-up      There are additional nodules of lesser size and/or TI-RADS score  These do not require additional evaluation based on ACR criteria       IMPRESSION:     Thyroid nodules as described above  The following meets current ACR criteria for ultrasound guided biopsy:         The 4 5 x 3 8 x 4 2 cm right mid lobe nodule  (Image number 4) (CRITERIA: TR 4, FNA if > 1 5 cm  Significant increase in size since a prior sonogram from 7/31/2014            Reference: ACR Thyroid Imaging, Reporting and Data System (TI-RADS): White Paper of the [employer]  J AM Harshad Radiol 2372;93:979-470  (additional recommendations based on American Thyroid Association 2015 guidelines )     The study was marked in [facility] for significant notification      Workstation performed: [id number]     DXA bone density spine hip and pelvis    Result Date: 8/5/2021  Narrative: CENTRAL DXA SCAN CLINICAL HISTORY:  73-year-old postmenopausal  female with a history of gastroesophageal reflux and Prilosec use  Osteoporosis screening  OTHER RISK FACTORS:  None  PHARMACOLOGIC THERAPY FOR OSTEOPOROSIS:  None  TECHNIQUE: Bone densitometry was performed using a Hologic Horizon A  bone densitometer  Regions of interest appear properly placed  COMPARISON: There are no prior DXA studies performed on this unit for comparison  RESULTS: LUMBAR SPINE L1-L4 : BMD  0 866  gm/cm2 T-score -1 6  These values are artifactually elevated due to degenerative sclerosis and osteophytosis  LEFT  TOTAL HIP: BMD:  0 836  gm/cm2 T-score:  -0 9 LEFT  FEMORAL NECK: BMD:  0 663  gm/cm2 T score: -1 7     Impression: 1  Low bone mass (OSTEOPENIA)  [Based on the left femoral neck] 2    The 10 year risk of hip fracture is 1 with the 10 year risk of major osteoporotic fracture being 9 as calculated by the [facility]/WHO fracture risk assessment tool (FRAX)  3   The current NOF guidelines recommend treating patients with a T-score of -2 5 or less in the lumbar spine or hips, or in post-menopausal women and men over the age of 48 with low bone mass (osteopenia) and a FRAX 10 year risk score of >3% for hip fracture and/or >20% for major osteoporotic fracture  4   The NOF recommends follow-up DXA in 1-2 years after initiating therapy for osteoporosis and every 2 years thereafter  More frequent evaluation is appropriate for patients with conditions associated with rapid bone loss, such as glucocorticoid therapy  The interval between DXA screenings may be longer for individuals without major risk factors and initial T-score in the normal or upper low bone mass range  The FRAX algorithm has certain limitations: -FRAX has not been validated in patients currently or previously treated with pharmacotherapy for osteoporosis  In such patients, clinical judgment must be exercised in interpreting FRAX scores  -Prior hip, vertebral and humeral fragility fractures appear to confer greater risk of subsequent fracture than fractures at other sites (this is especially true for individuals with severe vertebral fractures), but quantification of this incremental risk is not possible with FRAX  -FRAX underestimates fracture risk in patients with history of multiple fragility fractures  -FRAX may underestimate fracture risk in patients with history of frequent falls  -It is not appropriate to use FRAX to monitor treatment response   WHO CLASSIFICATION: Normal (a T-score of -1 0 or higher) Low bone mineral density (a T-score of less than -1 0 but higher than -2 5) Osteoporosis (a T-score of -2 5 or less) Severe osteoporosis (a T-score of -2 5 or less with a fragility fracture) Workstation performed: [id number]     [facility] bilateral w 3d & cad    Result Date: 8/6/2021  Narrative: DIAGNOSIS: Encounter for screening mammogram for malignant neoplasm of breast TECHNIQUE: Digital screening mammography was performed  Computer Aided Detection (CAD) analyzed all applicable images  COMPARISONS: Prior breast imaging dated: 01/13/2020 and 10/26/2013 RELEVANT HISTORY: Family Breast Cancer History: No known family history of breast cancer  Family Medical History: Family medical history includes colon cancer in 2 relatives (cousin, maternal grandfather)  Personal History: Hormone history includes birth control  Surgical history includes breast biopsy, hysterectomy, and oophorectomy  No known relevant medical history  The patient is scheduled in a reminder system for screening mammography  8-10% of cancers will be missed on mammography  Management of a palpable abnormality must be based on clinical grounds  Patients will be notified of their results via letter from our facility  Accredited by [employer] of Radiology and FDA  RISK ASSESSMENT: 5 Year Tyrer-Cuzick: 0 93 % 10 Year Tyrer-Cuzick: 1 73 % Lifetime Tyrer-Cuzick: 3 66 % TISSUE DENSITY: There are scattered areas of fibroglandular density  INDICATION: Namita Ramos is a 73 y o  female presenting for screening mammography  FINDINGS: There are no suspicious masses, grouped microcalcifications or areas of architectural distortion  The skin and nipple areolar complex are unremarkable  Impression: No mammographic evidence of malignancy  ASSESSMENT/BI-RADS CATEGORY: Left: 1 - Negative Right: 1 - Negative Overall: 1 - Negative RECOMMENDATION:      - Routine screening mammogram in 1 year for both breasts  Workstation ID: [id number]     I reviewed the above laboratory and imaging data  Discussion/Summary:  History of right-sided thyroid nodule, benign on biopsy  However size merits resection    Rationale for this along with risks and benefits of surgery including infection, bleeding, need for possible additional surgery, recurrent nerve injury, discussed with her  She understands the plan and wishes to proceed as outlined  All questions answered and consents signed at this visit for right-sided thyroidectomy

## 2021-09-03 NOTE — PROGRESS NOTES
Assessment/Plan:      Diagnoses and all orders for this visit:    Pincer nail deformity    Ingrowing nail      Slantback procedure to remove offending nail  The deformity of the nail is significant, recommend matrixectomy but she would like to wait until after her upcoming thyroid surgery  She may call as needed    Subjective:     Patient ID: Cayla Flores is a 73 y o  female  Patient left great toenail is ingrown and tender  She denies drainage  Review of Systems      Objective:     Physical Exam  Vitals reviewed  Constitutional:       Appearance: She is obese  She is not ill-appearing or diaphoretic  Cardiovascular:      Pulses:           Dorsalis pedis pulses are 2+ on the right side and 2+ on the left side  Posterior tibial pulses are 2+ on the right side and 2+ on the left side  Musculoskeletal:        Feet:    Feet:      Left foot:      Toenail Condition: Left toenails are abnormally thick  Neurological:      Mental Status: She is alert

## 2021-09-03 NOTE — PATIENT INSTRUCTIONS
Partial Thyroidectomy   WHAT YOU NEED TO KNOW:   A partial thyroidectomy is the removal of part of your thyroid  Your thyroid is a gland in the front lower part of your neck  The thyroid makes hormones that regulate your metabolism, body temperature, and heart rate  Smaller glands called parathyroids regulate the level of calcium in your blood  You have 4 parathyroids, located on the sides of your thyroid gland  Your parathyroids will not be removed during this surgery  DISCHARGE INSTRUCTIONS:   Medicines: The following medicines may  be ordered by your healthcare provider:  · Pain medicine  can help take away or decrease pain  Do not wait until the pain is severe before you take your medicine  · Thyroid medicine  may be given if your thyroid hormone level is too low  · Take your medicine as directed  Contact your healthcare provider if you think your medicine is not helping or if you have side effects  Tell him or her if you are allergic to any medicine  Keep a list of the medicines, vitamins, and herbs you take  Include the amounts, and when and why you take them  Bring the list or the pill bottles to follow-up visits  Carry your medicine list with you in case of an emergency  Follow up with your endocrinologist or surgeon as directed: You will need to return to have your wound checked and stitches removed  You may also need blood tests to monitor your calcium, parathyroid, and thyroid hormone levels  Write down your questions so you remember to ask them during your visits  Self-care:   · Rest when you need to while you heal after surgery  Slowly start to do more each day  Return to your daily activities as directed  · Wound care:  Carefully wash your skin near the incision wound area with soap and water  Dry the area and put on new, clean bandages as directed  Change your bandages when they get wet or dirty  You can use a mild body lotion to improve the scar       · Swallowing and voice changes: You may have a sore throat, hoarse voice, or difficulty swallowing after surgery  These symptoms should go away after a few days  · Supplements:  Ask your endocrinologist if you need to take calcium or vitamin D  Ask how much to take and how often to take it  Contact your endocrinologist or surgeon if:   · You have a fever or chills  · You feel very tired and cold, gain weight for no reason, and your skin is very dry  · You vomit several times in a row  · Your incision is red, swollen, or draining pus  · You have new voice weakness or hoarseness, or it is getting worse  · You have questions or concerns about your condition or care  Seek care immediately or call 911 if:   · You have sudden tingling or muscle cramps in your face, arm, or leg  · You have muscle spasms in your legs and feet that do not go away  · Blood soaks through your bandage  · Your stitches come apart  · You have sudden swelling in your neck or difficulty swallowing  · You have trouble breathing  · You have a seizure  © 2017 [facility] Information is for End User's use only and may not be sold, redistributed or otherwise used for commercial purposes  All illustrations and images included in CareNotes® are the copyrighted property of [employer]  or [employer]hal [employer]  The above information is an  only  It is not intended as medical advice for individual conditions or treatments  Talk to your doctor, nurse or pharmacist before following any medical regimen to see if it is safe and effective for you

## 2021-09-03 NOTE — PROGRESS NOTES
Surgical Oncology Follow Up       [facility] SURGICAL ONCOLOGY [facility] [address]    Lili Mathews  1955  [id number]  [facility] SURGICAL ONCOLOGY [place]  [place] [id number]    Chief Complaint   Patient presents with    Consult       Assessment/Plan:    No problem-specific Assessment & Plan notes found for this encounter  Diagnoses and all orders for this visit:    Multiple thyroid nodules    Abnormal thyroid biopsy  -     Ambulatory referral to Surgical Oncology        Advance Care Planning/Advance Directives:  Did discuss disease status, cancer treatment plans and/or cancer treatment goals with the patient  Oncology History    No history exists  History of Present Illness:   Patient is a 71-year-old woman with a longstanding history of right-sided thyroid nodule  This is been followed with multiple ultrasounds in the past   Most recently, the right-sided nodule was noted to have increased in size significantly to the point that biopsy was warranted  This was done which came back as [place] category 4  Afirma testing was done which came back benign  The nodule however, measured greater than 4 cm and she was subsequently referred for evaluation and treatment  She denies a history of radiation exposure to the head or neck area  No personal or family history of endocrine malignancies  Review of Systems   Constitutional: Negative  HENT: Negative  Negative for trouble swallowing and voice change  Eyes: Negative  Respiratory: Negative  Cardiovascular: Negative  Gastrointestinal: Negative  Endocrine: Negative  Genitourinary: Negative  Musculoskeletal: Negative  Skin: Negative  Allergic/Immunologic: Negative  Neurological: Negative  Hematological: Negative  Psychiatric/Behavioral: Negative            Patient Active Problem List Diagnosis    GERD (gastroesophageal reflux disease)    Hypertension    Type 2 diabetes mellitus with hyperglycemia, without long-term current use of insulin (HCC)    Dilated pancreatic duct    Duodenal ulcer    Multiple thyroid nodules    Right lower lobe pulmonary nodule    Osteoarthritis of right ankle and foot    Hammer toes of both feet    Class 1 obesity due to excess calories with serious comorbidity in adult    Hepatic steatosis    Pancreatic cyst    Chronic rhinitis    Malignant melanoma of neck ([facility] [address] )     Past Medical History:   Diagnosis Date    Acute GI bleeding 10/27/2018    Diabetes mellitus ([facility] [address] )     prediabetic     Duodenal ulcer 10/28/2018    Hypertension     Irritable bowel disease     Melanoma ([facility] [address] )     Melena 10/27/2018    Added automatically from request for surgery [id number]    Vertigo      Past Surgical History:   Procedure Laterality Date    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    ESOPHAGOGASTRODUODENOSCOPY N/A 10/28/2018    Procedure: ESOPHAGOGASTRODUODENOSCOPY (EGD); Surgeon: Mesha Kilgore MD;  [facility];  Service: Gastroenterology    HYSTERECTOMY      7584'K    OOPHORECTOMY Left     [facility] [address] [facility]  [facility]/20/2021     Family History   Problem Relation Age of Onset    Substance Abuse Father         alcohol    Alcohol abuse Father     No Known Problems Mother     No Known Problems Daughter     No Known Problems Maternal Grandmother    Greg Russo Colon cancer Maternal Grandfather         age unknown    No Known Problems Paternal Grandmother     No Known Problems Paternal Grandfather     No Known Problems Maternal Aunt     No Known Problems Paternal Aunt     No Known Problems Paternal Aunt     No Known Problems Paternal Aunt     No Known Problems Paternal Aunt     No Known Problems Paternal [employer]     Colon cancer Cousin         19's    Mental illness Neg Hx      Social History     Socioeconomic History    Marital status:   Spouse name: Not on file    Number of children: Not on file    Years of education: Not on file    Highest education level: Not on file   Occupational History    Not on file   Tobacco Use    Smoking status: Former Smoker     Packs/day:      Years: 10 00     Pack years: 10 00     Types: Cigarettes     Quit date: 1969     Years since quittin 8    Smokeless tobacco: Never Used   Vaping Use    Vaping Use: Never used   Substance and Sexual Activity    Alcohol use: No    Drug use: No    Sexual activity: Not Currently     Partners: Male   Other Topics Concern    Not on file   Social History Narrative    Lives alone---    Feels safe at home  Has dentures  No living will  Exercises daily--exercise bike and walking to dvd  Social Determinants of Health     Financial Resource Strain:     Difficulty of Paying Living Expenses:    Food Insecurity:     Worried About Running Out of Food in the Last Year:     [address] in the Last Year:    Transportation Needs: No Transportation Needs    Lack of Transportation (Medical): No    Lack of Transportation (Non-Medical):  No   Physical Activity:     Days of Exercise per Week:     Minutes of Exercise per Session:    Stress:     Feeling of Stress :    Social Connections:     Frequency of Communication with Friends and Family:     Frequency of Social Gatherings with Friends and Family:     Attends Che[employer] Services:     Active Member of Clubs or Organizations:     Attends Club or Organization Meetings:     Marital Status:    Intimate Partner Violence:     Fear of Current or Ex-Partner:     Emotionally Abused:     Physically Abused:     Sexually Abused:        Current Outpatient Medications:     acetaminophen (TYLENOL) 325 mg tablet, Take 650 mg by mouth every 6 (six) hours as needed for mild pain, Disp: , Rfl:     Ascorbic Acid (VITAMIN C) 100 MG tablet, Take 100 mg by mouth daily, Disp: , Rfl:     b complex vitamins capsule, Take 1 capsule by mouth daily, Disp: , Rfl:     Blood Glucose Monitoring Suppl (ONE TOUCH ULTRA 2) w/Device KIT, Testing 4 times daily, Disp: 1 each, Rfl: 0    Cholecalciferol (VITAMIN D3) 2000 units TABS, 1 daily, Disp: , Rfl:     glucosamine-chondroitin 500-400 MG tablet, Take 1 tablet by mouth 3 (three) times a day, Disp: , Rfl:     glucose blood (ONE TOUCH ULTRA TEST) test strip, Testing 4 times daily, Disp: 100 each, Rfl: 5    Lancets (ONETOUCH DELICA PLUS [id number]) MISC, , Disp: , Rfl:     magnesium 30 MG tablet, Take 250 mg by mouth daily , Disp: , Rfl:     Omega-3 Fatty Acids (FISH OIL) 1,000 mg, Take 1,000 mg by mouth daily, Disp: , Rfl:     omeprazole (PriLOSEC OTC) 20 MG tablet, 1 daily prn only, Disp: , Rfl:     Protein POWD, Take by mouth, Disp: , Rfl:     saccharomyces boulardii (FLORASTOR) 250 mg capsule, Take 250 mg by mouth 2 (two) times a day, Disp: , Rfl:     Turmeric 500 MG CAPS, Take by mouth 1 daily, Disp: , Rfl:     vitamin A 7500 UNIT capsule, Take 7,500 Units by mouth daily, Disp: , Rfl:     vitamin E, tocopherol, 1,000 units capsule, Take 1,000 Units by mouth daily, Disp: , Rfl:     metFORMIN (GLUCOPHAGE) 500 mg tablet, Take 1 tablet (500 mg total) by mouth 2 (two) times a day with meals (Patient not taking: Reported on 9/3/2021), Disp: 60 tablet, Rfl: 1    triamterene-hydrochlorothiazide (MAXZIDE-25) 37 5-25 mg per tablet, Take 1 tablet by mouth daily (Patient not taking: Reported on 9/3/2021), Disp: 30 tablet, Rfl: 3    Zoster Vac Recomb Adjuvanted (Shingrix) 50 MCG/0 5ML SUSR, Inject 2 Doses into a muscle see administration instructions 2 doses 2- 6 months apart (Patient not taking: Reported on 9/3/2021), Disp: 2 each, Rfl: 0  Allergies   Allergen Reactions    Prednisone Anxiety    Corticosteroids Anxiety     Vitals:    09/03/21 1454   BP: (!) 176/82   Pulse: 80   Resp: 18   Temp: 98 °F (36 7 °C)   SpO2: 97%       Physical Exam  Vitals reviewed  Constitutional:       Appearance: Normal appearance  HENT:      Head: Normocephalic and atraumatic  Right Ear: External ear normal       Left Ear: External ear normal    Eyes:      General: No scleral icterus  Neck:      Comments: Visible and palpable right-sided thyroid nodule  Cardiovascular:      Rate and Rhythm: Normal rate and regular rhythm  Heart sounds: Normal heart sounds  Pulmonary:      Effort: Pulmonary effort is normal       Breath sounds: Normal breath sounds  Abdominal:      General: Abdomen is flat  Palpations: Abdomen is soft  Musculoskeletal:         General: Normal range of motion  Cervical back: Normal range of motion and neck supple  No rigidity or tenderness  Lymphadenopathy:      Cervical: No cervical adenopathy  Skin:     General: Skin is warm and dry  Neurological:      General: No focal deficit present  Mental Status: She is alert and oriented to person, place, and time  Psychiatric:         Mood and Affect: Mood normal          Behavior: Behavior normal          Thought Content: Thought content normal          Judgment: Judgment normal          Pathology:  Case Report   Non-gynecologic Cytology                          Case: [id number]                                   Authorizing Provider: Lizette Dillard DO               Collected:           07/20/2021 1431               Ordering Location:     [facility]     Received:            07/20/2021 1519                                      [facility]                                                             Pathologist:           Marissa Ford MD                                                                   Specimens:   A) - Thyroid, Right, mid                                                                             B) - Thyroid, Right, mid                                                                   Final Diagnosis   A -B    Thyroid, Right, mid (Thin-prep and smear preparations): 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 ([place] Category IV)  - See note  Numerous isolated oncocytes in the absence of colloid      Satisfactory for evaluation      Note:  (1) As reported in the [facility] for Reporting Thyroid Cytopathology* this diagnostic category has demonstrated anywhere from 25-40% risk of malignancy being found in subsequent resections and/or FNA  This risk of malignancy is expected to change due to the usage of the surgical pathology diagnosis of non-invasive follicular thyroid neoplasm with papillary-like nuclear features (NIFTP)    The anticipated risk of malignancy secondary to NIFTP is 10-40%  The histologic follow-up of cases diagnosed as follicular neoplasm/suspicious for follicular neoplasm includes follicular adenoma, follicular carcinoma, and follicular variant of papillary thyroid carcinoma including the recently described indolent counterpart, NIFTP  The manual reports that the usual management following this diagnosis is genetic testing or lobectomy  Ultimately, clinical/imaging correlation for this patient is needed in arriving at the actual management plan      *The [place] System for Reporting Thyroid Cytopathology, Allyssa Mendoza (Paige Jc ), 2018 (2nd ed )    Electronically signed by Evita Tejada MD on 7/22/2021 at 10:46 AM   Note    The treating physician has requested a sample(s) from the above thyroid nodule(s) be sent for analysis by  [facility]  (Afirma GEC), performed by Olvin Cao [place], [place])  [facility] only performs this test on specimen(s) receiving a cytologic diagnosis of [place] Category III or  IV  If such a diagnosis is rendered (above) specimen will be sent to [facility], and a separate report with  results of Afirma GEC will follow directly from [facility] (typically taking 14 days)   If a cytologic  interpretation other than [place] category III or IV is rendered, specimen will not be sent for [facility]  Labs:  Lab Results   Component Value Date    [id number] 1 811 10/27/2018         Imaging    THYROID ULTRASOUND     INDICATION:    E04 2: Nontoxic multinodular goiter      COMPARISON:  Thyroid sonogram from July 31, 2014      TECHNIQUE:   Ultrasound of the thyroid was performed with a high frequency linear transducer in transverse and sagittal planes including volumetric imaging sweeps as well as traditional still imaging technique      FINDINGS:  Normal homogeneous smooth echotexture      Right lobe:  5 5 x 3 8 x 4 4 cm   44 2  Left lobe:  4 5 x 1 5 x 1 2 cm   3 9  Isthmus:  0 4 cm      Nodule #1  Image 4  RIGHT midlobe nodule measuring 4 5 x 3 8 x 4 2 cm  This nodule has signficantly increased in size from prior  COMPOSITION:  2 points, solid or almost completely solid   ECHOGENICITY:  1 point, isoechoic  SHAPE:  0 points, wider-than-tall  MARGIN: 0 points, smooth  ECHOGENIC FOCI:  0 points, none  TI-RADS Score: TR 3 (3 points),  FNA if >2 5 cm  Follow if >1 5 cm      Nodule #2  Image 27  LEFT upper pole nodule measuring 2 0 x 1 5 x 1 4 cm  Given differences in measuring technique, no significant change from prior  COMPOSITION:  2 points, solid or almost completely solid   ECHOGENICITY:  2 points, hypoechoic  SHAPE:  0 points, wider-than-tall  MARGIN: 0 points, smooth  ECHOGENIC FOCI:  1 point, single 2 mm macrocalcification  TI-RADS Score: TR 4 (4-6 points),  FNA if > 1 5 cm  Follow if > 1cm  This nodule has been stable for greater than 5 years and does not require additional follow-up      There are additional nodules of lesser size and/or TI-RADS score  These do not require additional evaluation based on ACR criteria       IMPRESSION:     Thyroid nodules as described above  The following meets current ACR criteria for ultrasound guided biopsy:         The 4 5 x 3 8 x 4 2 cm right mid lobe nodule   (Image number 4) (CRITERIA: TR 4, FNA if > 1 5 cm  Significant increase in size since a prior sonogram from 7/31/2014            Reference: ACR Thyroid Imaging, Reporting and Data System (TI-RADS): White Paper of the [employer]  J AM Harshad Radiol 6860;32:[id number]  (additional recommendations based on American Thyroid Association 2015 guidelines )     The study was marked in [facility] for significant notification      Workstation performed: [id number]     DXA bone density spine hip and pelvis    Result Date: 8/5/2021  Narrative: CENTRAL DXA SCAN CLINICAL HISTORY:  18-year-old postmenopausal  female with a history of gastroesophageal reflux and Prilosec use  Osteoporosis screening  OTHER RISK FACTORS:  None  PHARMACOLOGIC THERAPY FOR OSTEOPOROSIS:  None  TECHNIQUE: Bone densitometry was performed using a Hologic Horizon A  bone densitometer  Regions of interest appear properly placed  COMPARISON: There are no prior DXA studies performed on this unit for comparison  RESULTS: LUMBAR SPINE L1-L4 : BMD  0 866  gm/cm2 T-score -1 6  These values are artifactually elevated due to degenerative sclerosis and osteophytosis  LEFT  TOTAL HIP: BMD:  0 836  gm/cm2 T-score:  -0 9 LEFT  FEMORAL NECK: BMD:  0 663  gm/cm2 T score: -1 7     Impression: 1  Low bone mass (OSTEOPENIA)  [Based on the left femoral neck] 2  The 10 year risk of hip fracture is 1 with the 10 year risk of major osteoporotic fracture being 9 as calculated by the [facility]/WHO fracture risk assessment tool (FRAX)  3   The current NOF guidelines recommend treating patients with a T-score of -2 5 or less in the lumbar spine or hips, or in post-menopausal women and men over the age of 48 with low bone mass (osteopenia) and a FRAX 10 year risk score of >3% for hip fracture and/or >20% for major osteoporotic fracture  4   The NOF recommends follow-up DXA in 1-2 years after initiating therapy for osteoporosis and every 2 years thereafter   More frequent evaluation is appropriate for patients with conditions associated with rapid bone loss, such as glucocorticoid therapy  The interval between DXA screenings may be longer for individuals without major risk factors and initial T-score in the normal or upper low bone mass range  The FRAX algorithm has certain limitations: -FRAX has not been validated in patients currently or previously treated with pharmacotherapy for osteoporosis  In such patients, clinical judgment must be exercised in interpreting FRAX scores  -Prior hip, vertebral and humeral fragility fractures appear to confer greater risk of subsequent fracture than fractures at other sites (this is especially true for individuals with severe vertebral fractures), but quantification of this incremental risk is not possible with FRAX  -FRAX underestimates fracture risk in patients with history of multiple fragility fractures  -FRAX may underestimate fracture risk in patients with history of frequent falls  -It is not appropriate to use FRAX to monitor treatment response  WHO CLASSIFICATION: Normal (a T-score of -1 0 or higher) Low bone mineral density (a T-score of less than -1 0 but higher than -2 5) Osteoporosis (a T-score of -2 5 or less) Severe osteoporosis (a T-score of -2 5 or less with a fragility fracture) Workstation performed: [id number]     Mammo screening bilateral w 3d & cad    Result Date: 8/6/2021  Narrative: DIAGNOSIS: Encounter for screening mammogram for malignant neoplasm of breast TECHNIQUE: Digital screening mammography was performed  Computer Aided Detection (CAD) analyzed all applicable images  COMPARISONS: Prior breast imaging dated: 01/13/2020 and 10/26/2013 RELEVANT HISTORY: Family Breast Cancer History: No known family history of breast cancer  Family Medical History: Family medical history includes colon cancer in 2 relatives (cousin, maternal grandfather)  Personal History: Hormone history includes birth control   Surgical history includes breast biopsy, hysterectomy, and oophorectomy  No known relevant medical history  The patient is scheduled in a reminder system for screening mammography  8-10% of cancers will be missed on mammography  Management of a palpable abnormality must be based on clinical grounds  Patients will be notified of their results via letter from our facility  Accredited by [address] [place] [address] of Radiology and [facility]  RISK ASSESSMENT: 5 Year Tyrer-Cuzick: 0 93 % 10 Year Tyrer-Cuzick: 1 73 % Lifetime Tyrer-Cuzick: 3 66 % TISSUE DENSITY: There are scattered areas of fibroglandular density  INDICATION: Raul Ruiz is a 73 y o  female presenting for screening mammography  FINDINGS: There are no suspicious masses, grouped microcalcifications or areas of architectural distortion  The skin and nipple areolar complex are unremarkable  Impression: No mammographic evidence of malignancy  ASSESSMENT/BI-RADS CATEGORY: Left: 1 - Negative Right: 1 - Negative Overall: 1 - Negative RECOMMENDATION:      - Routine screening mammogram in 1 year for both breasts  Workstation ID: [id number]     I reviewed the above laboratory and imaging data  Discussion/Summary:  History of right-sided thyroid nodule, benign on biopsy  However size merits resection  Rationale for this along with risks and benefits of surgery including infection, bleeding, need for possible additional surgery, recurrent nerve injury, discussed with her  She understands the plan and wishes to proceed as outlined  All questions answered and consents signed at this visit for right-sided thyroidectomy

## 2021-09-04 ENCOUNTER — OFFICE VISIT (OUTPATIENT)
Dept: LAB | Facility: HOSPITAL | Age: 66
End: 2021-09-04
Attending: SURGERY
Payer: COMMERCIAL

## 2021-09-04 DIAGNOSIS — E04.2 MULTIPLE THYROID NODULES: ICD-10-CM

## 2021-09-04 PROCEDURE — 93005 ELECTROCARDIOGRAM TRACING: CPT

## 2021-09-05 LAB
ALBUMIN SERPL-MCNC: 4.3 G/DL (ref 3.8–4.8)
ALBUMIN/GLOB SERPL: 2.2 {RATIO} (ref 1.2–2.2)
ALP SERPL-CCNC: 78 IU/L (ref 48–121)
ALT SERPL-CCNC: 18 IU/L (ref 0–32)
AST SERPL-CCNC: 14 IU/L (ref 0–40)
BASOPHILS # BLD AUTO: 0.1 X10E3/UL (ref 0–0.2)
BASOPHILS NFR BLD AUTO: 1 %
BILIRUB SERPL-MCNC: 0.5 MG/DL (ref 0–1.2)
BUN SERPL-MCNC: 16 MG/DL (ref 8–27)
BUN/CREAT SERPL: 29 (ref 12–28)
CALCIUM SERPL-MCNC: 8.8 MG/DL (ref 8.7–10.3)
CHLORIDE SERPL-SCNC: 101 MMOL/L (ref 96–106)
CO2 SERPL-SCNC: 22 MMOL/L (ref 20–29)
CREAT SERPL-MCNC: 0.56 MG/DL (ref 0.57–1)
EOSINOPHIL # BLD AUTO: 0.2 X10E3/UL (ref 0–0.4)
EOSINOPHIL NFR BLD AUTO: 2 %
ERYTHROCYTE [DISTWIDTH] IN BLOOD BY AUTOMATED COUNT: 12.4 % (ref 11.7–15.4)
EST. AVERAGE GLUCOSE BLD GHB EST-MCNC: 212 MG/DL
GLOBULIN SER-MCNC: 2 G/DL (ref 1.5–4.5)
GLUCOSE SERPL-MCNC: 169 MG/DL (ref 65–99)
HBA1C MFR BLD: 9 % (ref 4.8–5.6)
HCT VFR BLD AUTO: 44 % (ref 34–46.6)
HGB BLD-MCNC: 14.7 G/DL (ref 11.1–15.9)
IMM GRANULOCYTES # BLD: 0 X10E3/UL (ref 0–0.1)
IMM GRANULOCYTES NFR BLD: 0 %
LYMPHOCYTES # BLD AUTO: 2.8 X10E3/UL (ref 0.7–3.1)
LYMPHOCYTES NFR BLD AUTO: 38 %
MCH RBC QN AUTO: 30.2 PG (ref 26.6–33)
MCHC RBC AUTO-ENTMCNC: 33.4 G/DL (ref 31.5–35.7)
MCV RBC AUTO: 90 FL (ref 79–97)
MONOCYTES # BLD AUTO: 0.5 X10E3/UL (ref 0.1–0.9)
MONOCYTES NFR BLD AUTO: 7 %
NEUTROPHILS # BLD AUTO: 3.8 X10E3/UL (ref 1.4–7)
NEUTROPHILS NFR BLD AUTO: 52 %
PLATELET # BLD AUTO: 243 X10E3/UL (ref 150–450)
POTASSIUM SERPL-SCNC: 4.3 MMOL/L (ref 3.5–5.2)
PROT SERPL-MCNC: 6.3 G/DL (ref 6–8.5)
RBC # BLD AUTO: 4.87 X10E6/UL (ref 3.77–5.28)
SL AMB EGFR AFRICAN AMERICAN: 113 ML/MIN/1.73
SL AMB EGFR NON AFRICAN AMERICAN: 98 ML/MIN/1.73
SODIUM SERPL-SCNC: 136 MMOL/L (ref 134–144)
WBC # BLD AUTO: 7.4 X10E3/UL (ref 3.4–10.8)

## 2021-09-05 PROCEDURE — 3052F HG A1C>EQUAL 8.0%<EQUAL 9.0%: CPT | Performed by: PHYSICIAN ASSISTANT

## 2021-09-07 LAB
ATRIAL RATE: 74 BPM
P AXIS: 50 DEGREES
PR INTERVAL: 142 MS
QRS AXIS: 21 DEGREES
QRSD INTERVAL: 94 MS
QT INTERVAL: 382 MS
QTC INTERVAL: 424 MS
T WAVE AXIS: 20 DEGREES
VENTRICULAR RATE: 74 BPM

## 2021-09-07 PROCEDURE — 93010 ELECTROCARDIOGRAM REPORT: CPT | Performed by: INTERNAL MEDICINE

## 2021-10-05 ENCOUNTER — ANESTHESIA EVENT (OUTPATIENT)
Dept: PERIOP | Facility: HOSPITAL | Age: 66
End: 2021-10-05
Payer: COMMERCIAL

## 2021-10-12 ENCOUNTER — HOSPITAL ENCOUNTER (OUTPATIENT)
Facility: HOSPITAL | Age: 66
Setting detail: OUTPATIENT SURGERY
Discharge: HOME | End: 2021-10-13
Attending: SURGERY | Admitting: SURGERY
Payer: COMMERCIAL

## 2021-10-12 ENCOUNTER — ANESTHESIA (OUTPATIENT)
Dept: PERIOP | Facility: HOSPITAL | Age: 66
End: 2021-10-12
Payer: COMMERCIAL

## 2021-10-12 DIAGNOSIS — E04.2 MULTIPLE THYROID NODULES: ICD-10-CM

## 2021-10-12 LAB
GLUCOSE SERPL-MCNC: 153 MG/DL (ref 65–140)
GLUCOSE SERPL-MCNC: 199 MG/DL (ref 65–140)
GLUCOSE SERPL-MCNC: 220 MG/DL (ref 65–140)

## 2021-10-12 PROCEDURE — 82948 REAGENT STRIP/BLOOD GLUCOSE: CPT

## 2021-10-12 PROCEDURE — 60220 PARTIAL REMOVAL OF THYROID: CPT | Performed by: SURGERY

## 2021-10-12 PROCEDURE — 88307 TISSUE EXAM BY PATHOLOGIST: CPT | Performed by: PATHOLOGY

## 2021-10-12 PROCEDURE — 88342 IMHCHEM/IMCYTCHM 1ST ANTB: CPT | Performed by: PATHOLOGY

## 2021-10-12 PROCEDURE — C1765 ADHESION BARRIER: HCPCS | Performed by: SURGERY

## 2021-10-12 PROCEDURE — 99024 POSTOP FOLLOW-UP VISIT: CPT | Performed by: SURGERY

## 2021-10-12 RX ORDER — PROPOFOL 10 MG/ML
INJECTION, EMULSION INTRAVENOUS AS NEEDED
Status: DISCONTINUED | OUTPATIENT
Start: 2021-10-12 | End: 2021-10-12

## 2021-10-12 RX ORDER — LIDOCAINE HYDROCHLORIDE 10 MG/ML
INJECTION, SOLUTION EPIDURAL; INFILTRATION; INTRACAUDAL; PERINEURAL AS NEEDED
Status: DISCONTINUED | OUTPATIENT
Start: 2021-10-12 | End: 2021-10-12

## 2021-10-12 RX ORDER — FENTANYL CITRATE/PF 50 MCG/ML
25 SYRINGE (ML) INJECTION
Status: DISCONTINUED | OUTPATIENT
Start: 2021-10-12 | End: 2021-10-12 | Stop reason: HOSPADM

## 2021-10-12 RX ORDER — SODIUM CHLORIDE, SODIUM LACTATE, POTASSIUM CHLORIDE, CALCIUM CHLORIDE 600; 310; 30; 20 MG/100ML; MG/100ML; MG/100ML; MG/100ML
75 INJECTION, SOLUTION INTRAVENOUS CONTINUOUS
Status: DISCONTINUED | OUTPATIENT
Start: 2021-10-12 | End: 2021-10-12

## 2021-10-12 RX ORDER — ONDANSETRON 2 MG/ML
INJECTION INTRAMUSCULAR; INTRAVENOUS AS NEEDED
Status: DISCONTINUED | OUTPATIENT
Start: 2021-10-12 | End: 2021-10-12

## 2021-10-12 RX ORDER — METOCLOPRAMIDE HYDROCHLORIDE 5 MG/ML
10 INJECTION INTRAMUSCULAR; INTRAVENOUS ONCE AS NEEDED
Status: COMPLETED | OUTPATIENT
Start: 2021-10-12 | End: 2021-10-12

## 2021-10-12 RX ORDER — SODIUM CHLORIDE, SODIUM LACTATE, POTASSIUM CHLORIDE, CALCIUM CHLORIDE 600; 310; 30; 20 MG/100ML; MG/100ML; MG/100ML; MG/100ML
125 INJECTION, SOLUTION INTRAVENOUS CONTINUOUS
Status: DISCONTINUED | OUTPATIENT
Start: 2021-10-12 | End: 2021-10-12

## 2021-10-12 RX ORDER — ONDANSETRON 2 MG/ML
4 INJECTION INTRAMUSCULAR; INTRAVENOUS EVERY 6 HOURS PRN
Status: DISCONTINUED | OUTPATIENT
Start: 2021-10-12 | End: 2021-10-13 | Stop reason: HOSPADM

## 2021-10-12 RX ORDER — DEXAMETHASONE SODIUM PHOSPHATE 10 MG/ML
INJECTION, SOLUTION INTRAMUSCULAR; INTRAVENOUS AS NEEDED
Status: DISCONTINUED | OUTPATIENT
Start: 2021-10-12 | End: 2021-10-12

## 2021-10-12 RX ORDER — DOCUSATE SODIUM 100 MG/1
100 CAPSULE, LIQUID FILLED ORAL 2 TIMES DAILY PRN
Status: DISCONTINUED | OUTPATIENT
Start: 2021-10-12 | End: 2021-10-13 | Stop reason: HOSPADM

## 2021-10-12 RX ORDER — FENTANYL CITRATE 50 UG/ML
INJECTION, SOLUTION INTRAMUSCULAR; INTRAVENOUS AS NEEDED
Status: DISCONTINUED | OUTPATIENT
Start: 2021-10-12 | End: 2021-10-12

## 2021-10-12 RX ORDER — ACETAMINOPHEN 325 MG/1
650 TABLET ORAL EVERY 6 HOURS PRN
Status: DISCONTINUED | OUTPATIENT
Start: 2021-10-12 | End: 2021-10-13 | Stop reason: HOSPADM

## 2021-10-12 RX ORDER — OXYCODONE HYDROCHLORIDE 5 MG/1
5 TABLET ORAL EVERY 4 HOURS PRN
Status: DISCONTINUED | OUTPATIENT
Start: 2021-10-12 | End: 2021-10-13 | Stop reason: HOSPADM

## 2021-10-12 RX ORDER — SODIUM CHLORIDE 9 MG/ML
INJECTION, SOLUTION INTRAVENOUS CONTINUOUS PRN
Status: DISCONTINUED | OUTPATIENT
Start: 2021-10-12 | End: 2021-10-12

## 2021-10-12 RX ORDER — MIDAZOLAM HYDROCHLORIDE 2 MG/2ML
INJECTION, SOLUTION INTRAMUSCULAR; INTRAVENOUS AS NEEDED
Status: DISCONTINUED | OUTPATIENT
Start: 2021-10-12 | End: 2021-10-12

## 2021-10-12 RX ORDER — HEPARIN SODIUM 5000 [USP'U]/ML
5000 INJECTION, SOLUTION INTRAVENOUS; SUBCUTANEOUS EVERY 8 HOURS SCHEDULED
Status: DISCONTINUED | OUTPATIENT
Start: 2021-10-12 | End: 2021-10-13 | Stop reason: HOSPADM

## 2021-10-12 RX ORDER — ONDANSETRON 2 MG/ML
4 INJECTION INTRAMUSCULAR; INTRAVENOUS ONCE AS NEEDED
Status: COMPLETED | OUTPATIENT
Start: 2021-10-12 | End: 2021-10-12

## 2021-10-12 RX ORDER — OXYCODONE HYDROCHLORIDE 10 MG/1
10 TABLET ORAL EVERY 4 HOURS PRN
Status: DISCONTINUED | OUTPATIENT
Start: 2021-10-12 | End: 2021-10-13 | Stop reason: HOSPADM

## 2021-10-12 RX ORDER — HYDROMORPHONE HCL/PF 1 MG/ML
0.5 SYRINGE (ML) INJECTION EVERY 4 HOURS PRN
Status: DISCONTINUED | OUTPATIENT
Start: 2021-10-12 | End: 2021-10-13 | Stop reason: HOSPADM

## 2021-10-12 RX ORDER — HYDROMORPHONE HCL IN WATER/PF 6 MG/30 ML
0.2 PATIENT CONTROLLED ANALGESIA SYRINGE INTRAVENOUS
Status: DISCONTINUED | OUTPATIENT
Start: 2021-10-12 | End: 2021-10-12 | Stop reason: HOSPADM

## 2021-10-12 RX ORDER — PROMETHAZINE HYDROCHLORIDE 25 MG/ML
12.5 INJECTION, SOLUTION INTRAMUSCULAR; INTRAVENOUS ONCE AS NEEDED
Status: DISCONTINUED | OUTPATIENT
Start: 2021-10-12 | End: 2021-10-12 | Stop reason: HOSPADM

## 2021-10-12 RX ORDER — SODIUM CHLORIDE, SODIUM LACTATE, POTASSIUM CHLORIDE, CALCIUM CHLORIDE 600; 310; 30; 20 MG/100ML; MG/100ML; MG/100ML; MG/100ML
INJECTION, SOLUTION INTRAVENOUS CONTINUOUS PRN
Status: DISCONTINUED | OUTPATIENT
Start: 2021-10-12 | End: 2021-10-12

## 2021-10-12 RX ORDER — LIDOCAINE HYDROCHLORIDE 10 MG/ML
0.5 INJECTION, SOLUTION EPIDURAL; INFILTRATION; INTRACAUDAL; PERINEURAL ONCE AS NEEDED
Status: COMPLETED | OUTPATIENT
Start: 2021-10-12 | End: 2021-10-12

## 2021-10-12 RX ORDER — ROCURONIUM BROMIDE 10 MG/ML
INJECTION, SOLUTION INTRAVENOUS AS NEEDED
Status: DISCONTINUED | OUTPATIENT
Start: 2021-10-12 | End: 2021-10-12

## 2021-10-12 RX ADMIN — SODIUM CHLORIDE, SODIUM LACTATE, POTASSIUM CHLORIDE, AND CALCIUM CHLORIDE 125 ML/HR: .6; .31; .03; .02 INJECTION, SOLUTION INTRAVENOUS at 15:53

## 2021-10-12 RX ADMIN — DEXAMETHASONE SODIUM PHOSPHATE 5 MG: 10 INJECTION, SOLUTION INTRAMUSCULAR; INTRAVENOUS at 13:39

## 2021-10-12 RX ADMIN — METOCLOPRAMIDE 10 MG: 5 INJECTION, SOLUTION INTRAMUSCULAR; INTRAVENOUS at 16:17

## 2021-10-12 RX ADMIN — PROPOFOL 50 MG: 10 INJECTION, EMULSION INTRAVENOUS at 13:17

## 2021-10-12 RX ADMIN — ONDANSETRON 4 MG: 2 INJECTION INTRAMUSCULAR; INTRAVENOUS at 15:50

## 2021-10-12 RX ADMIN — FENTANYL CITRATE 50 MCG: 50 INJECTION INTRAMUSCULAR; INTRAVENOUS at 13:17

## 2021-10-12 RX ADMIN — MIDAZOLAM 2 MG: 1 INJECTION INTRAMUSCULAR; INTRAVENOUS at 13:09

## 2021-10-12 RX ADMIN — INSULIN LISPRO 2 UNITS: 100 INJECTION, SOLUTION INTRAVENOUS; SUBCUTANEOUS at 17:56

## 2021-10-12 RX ADMIN — ONDANSETRON 4 MG: 2 INJECTION INTRAMUSCULAR; INTRAVENOUS at 13:39

## 2021-10-12 RX ADMIN — ROCURONIUM BROMIDE 50 MG: 50 INJECTION, SOLUTION INTRAVENOUS at 13:15

## 2021-10-12 RX ADMIN — INSULIN LISPRO 3 UNITS: 100 INJECTION, SOLUTION INTRAVENOUS; SUBCUTANEOUS at 16:08

## 2021-10-12 RX ADMIN — HEPARIN SODIUM 5000 UNITS: 5000 INJECTION INTRAVENOUS; SUBCUTANEOUS at 17:57

## 2021-10-12 RX ADMIN — HEPARIN SODIUM 5000 UNITS: 5000 INJECTION INTRAVENOUS; SUBCUTANEOUS at 21:23

## 2021-10-12 RX ADMIN — SODIUM CHLORIDE, SODIUM LACTATE, POTASSIUM CHLORIDE, AND CALCIUM CHLORIDE: .6; .31; .03; .02 INJECTION, SOLUTION INTRAVENOUS at 13:11

## 2021-10-12 RX ADMIN — SODIUM CHLORIDE: 9 INJECTION, SOLUTION INTRAVENOUS at 13:20

## 2021-10-12 RX ADMIN — LIDOCAINE HYDROCHLORIDE 0.5 ML: 10 INJECTION, SOLUTION EPIDURAL; INFILTRATION; INTRACAUDAL; PERINEURAL at 13:10

## 2021-10-12 RX ADMIN — FENTANYL CITRATE 50 MCG: 50 INJECTION INTRAMUSCULAR; INTRAVENOUS at 13:55

## 2021-10-12 RX ADMIN — PHENYLEPHRINE HYDROCHLORIDE 40 MCG/MIN: 10 INJECTION INTRAVENOUS at 13:23

## 2021-10-12 RX ADMIN — LIDOCAINE HYDROCHLORIDE 50 MG: 10 INJECTION, SOLUTION EPIDURAL; INFILTRATION; INTRACAUDAL; PERINEURAL at 13:15

## 2021-10-12 RX ADMIN — SODIUM CHLORIDE, SODIUM LACTATE, POTASSIUM CHLORIDE, AND CALCIUM CHLORIDE 125 ML/HR: .6; .31; .03; .02 INJECTION, SOLUTION INTRAVENOUS at 13:10

## 2021-10-12 RX ADMIN — SUGAMMADEX 200 MG: 100 INJECTION, SOLUTION INTRAVENOUS at 14:56

## 2021-10-12 RX ADMIN — FENTANYL CITRATE 50 MCG: 50 INJECTION INTRAMUSCULAR; INTRAVENOUS at 13:15

## 2021-10-12 RX ADMIN — PROPOFOL 100 MG: 10 INJECTION, EMULSION INTRAVENOUS at 13:15

## 2021-10-13 VITALS
HEART RATE: 79 BPM | SYSTOLIC BLOOD PRESSURE: 134 MMHG | TEMPERATURE: 98.1 F | RESPIRATION RATE: 13 BRPM | DIASTOLIC BLOOD PRESSURE: 60 MMHG | HEIGHT: 62 IN | BODY MASS INDEX: 32.94 KG/M2 | WEIGHT: 179 LBS | OXYGEN SATURATION: 92 %

## 2021-10-13 LAB
GLUCOSE SERPL-MCNC: 175 MG/DL (ref 65–140)
GLUCOSE SERPL-MCNC: 184 MG/DL (ref 65–140)

## 2021-10-13 PROCEDURE — NC001 PR NO CHARGE: Performed by: SURGERY

## 2021-10-13 PROCEDURE — 99024 POSTOP FOLLOW-UP VISIT: CPT | Performed by: SURGERY

## 2021-10-13 PROCEDURE — 82948 REAGENT STRIP/BLOOD GLUCOSE: CPT

## 2021-10-13 RX ADMIN — INSULIN LISPRO 1 UNITS: 100 INJECTION, SOLUTION INTRAVENOUS; SUBCUTANEOUS at 08:28

## 2021-10-13 RX ADMIN — ACETAMINOPHEN 650 MG: 325 TABLET, FILM COATED ORAL at 04:36

## 2021-10-13 RX ADMIN — HEPARIN SODIUM 5000 UNITS: 5000 INJECTION INTRAVENOUS; SUBCUTANEOUS at 08:28

## 2021-10-20 ENCOUNTER — TELEPHONE (OUTPATIENT)
Dept: HEMATOLOGY ONCOLOGY | Facility: CLINIC | Age: 66
End: 2021-10-20

## 2021-10-26 PROBLEM — C73 HURTHLE CELL CARCINOMA OF THYROID (HCC): Status: ACTIVE | Noted: 2021-10-26

## 2021-10-29 ENCOUNTER — OFFICE VISIT (OUTPATIENT)
Dept: SURGICAL ONCOLOGY | Facility: CLINIC | Age: 66
End: 2021-10-29

## 2021-10-29 VITALS
TEMPERATURE: 99 F | WEIGHT: 180 LBS | SYSTOLIC BLOOD PRESSURE: 150 MMHG | BODY MASS INDEX: 33.13 KG/M2 | OXYGEN SATURATION: 99 % | RESPIRATION RATE: 18 BRPM | HEART RATE: 92 BPM | DIASTOLIC BLOOD PRESSURE: 80 MMHG | HEIGHT: 62 IN

## 2021-10-29 DIAGNOSIS — C73 HURTHLE CELL CARCINOMA OF THYROID (HCC): Primary | ICD-10-CM

## 2021-10-29 PROCEDURE — 99024 POSTOP FOLLOW-UP VISIT: CPT | Performed by: SURGERY

## 2021-10-29 PROCEDURE — 3008F BODY MASS INDEX DOCD: CPT | Performed by: SURGERY

## 2021-11-02 ENCOUNTER — HOSPITAL ENCOUNTER (OUTPATIENT)
Dept: ULTRASOUND IMAGING | Facility: HOSPITAL | Age: 66
Discharge: HOME | End: 2021-11-02
Attending: SURGERY
Payer: COMMERCIAL

## 2021-11-02 DIAGNOSIS — C73 HURTHLE CELL CARCINOMA OF THYROID (HCC): ICD-10-CM

## 2021-11-02 PROCEDURE — 76536 US EXAM OF HEAD AND NECK: CPT

## 2021-11-08 ENCOUNTER — TELEPHONE (OUTPATIENT)
Dept: GYNECOLOGIC ONCOLOGY | Facility: CLINIC | Age: 66
End: 2021-11-08

## 2021-11-10 ENCOUNTER — TELEPHONE (OUTPATIENT)
Dept: HEMATOLOGY ONCOLOGY | Facility: CLINIC | Age: 66
End: 2021-11-10

## 2021-11-12 ENCOUNTER — TELEPHONE (OUTPATIENT)
Dept: HEMATOLOGY ONCOLOGY | Facility: CLINIC | Age: 66
End: 2021-11-12

## 2021-11-17 ENCOUNTER — TELEPHONE (OUTPATIENT)
Dept: HEMATOLOGY ONCOLOGY | Facility: CLINIC | Age: 66
End: 2021-11-17

## 2021-11-19 ENCOUNTER — OFFICE VISIT (OUTPATIENT)
Dept: SURGICAL ONCOLOGY | Facility: CLINIC | Age: 66
End: 2021-11-19
Payer: COMMERCIAL

## 2021-11-19 VITALS
WEIGHT: 184.2 LBS | RESPIRATION RATE: 18 BRPM | OXYGEN SATURATION: 98 % | BODY MASS INDEX: 33.9 KG/M2 | HEIGHT: 62 IN | HEART RATE: 90 BPM | DIASTOLIC BLOOD PRESSURE: 90 MMHG | SYSTOLIC BLOOD PRESSURE: 180 MMHG | TEMPERATURE: 97.7 F

## 2021-11-19 DIAGNOSIS — C73 HURTHLE CELL CARCINOMA OF THYROID (HCC): Primary | ICD-10-CM

## 2021-11-19 PROCEDURE — 99024 POSTOP FOLLOW-UP VISIT: CPT | Performed by: SURGERY

## 2021-11-19 PROCEDURE — 3008F BODY MASS INDEX DOCD: CPT | Performed by: SURGERY

## 2021-11-19 PROCEDURE — 3080F DIAST BP >= 90 MM HG: CPT | Performed by: SURGERY

## 2021-11-19 PROCEDURE — 1036F TOBACCO NON-USER: CPT | Performed by: SURGERY

## 2021-11-19 PROCEDURE — 3077F SYST BP >= 140 MM HG: CPT | Performed by: SURGERY

## 2021-11-19 RX ORDER — ENALAPRIL MALEATE 2.5 MG/1
TABLET ORAL
COMMUNITY
Start: 2021-11-11

## 2021-11-19 RX ORDER — ORAL SEMAGLUTIDE 3 MG/1
TABLET ORAL
COMMUNITY
Start: 2021-11-17

## 2021-11-19 RX ORDER — ROSUVASTATIN CALCIUM 10 MG/1
TABLET, COATED ORAL
COMMUNITY
Start: 2021-11-11

## 2021-11-19 RX ORDER — CEFAZOLIN SODIUM 2 G/50ML
2000 SOLUTION INTRAVENOUS ONCE
Status: CANCELLED | OUTPATIENT
Start: 2021-11-19 | End: 2021-11-19

## 2021-11-26 LAB
COMMENT: ABNORMAL
EST. AVERAGE GLUCOSE BLD GHB EST-MCNC: 214 MG/DL
HBA1C MFR BLD: 9.1 % (ref 4.8–5.6)

## 2021-11-26 PROCEDURE — 3046F HEMOGLOBIN A1C LEVEL >9.0%: CPT | Performed by: SURGERY

## 2022-03-21 ENCOUNTER — TELEPHONE (OUTPATIENT)
Dept: FAMILY MEDICINE CLINIC | Facility: HOSPITAL | Age: 67
End: 2022-03-21

## 2022-03-21 NOTE — TELEPHONE ENCOUNTER
I spoke to pt  Gave her dr Luis Carlos Garnica message about having f/u CT done of chest, non contrast --re pulmonary nodules found on 6/2021 CT  She remembers about the repeat CT Scan that needs to be done  She transferred to dr Isaac Cagle, but was not happy  She is now transferring to dr Celestine Sandifer  Pt said she stopped in here last week to sign release of info form so that records can be sent to dr Celestine Sandifer  I told pt to make sure that she discuss's this issue with dr Lois Kaplan  She said that she would    dk

## 2022-08-08 ENCOUNTER — HOSPITAL ENCOUNTER (OUTPATIENT)
Dept: MAMMOGRAPHY | Facility: IMAGING CENTER | Age: 67
Discharge: HOME | End: 2022-08-08
Payer: COMMERCIAL

## 2022-08-08 VITALS — BODY MASS INDEX: 28.23 KG/M2 | HEIGHT: 64 IN | WEIGHT: 165.34 LBS

## 2022-08-08 DIAGNOSIS — Z12.31 ENCOUNTER FOR SCREENING MAMMOGRAM FOR MALIGNANT NEOPLASM OF BREAST: ICD-10-CM

## 2022-08-08 DIAGNOSIS — Z12.31 VISIT FOR SCREENING MAMMOGRAM: ICD-10-CM

## 2022-08-08 PROCEDURE — 77063 BREAST TOMOSYNTHESIS BI: CPT

## 2022-08-08 PROCEDURE — 77067 SCR MAMMO BI INCL CAD: CPT

## 2022-09-08 ENCOUNTER — TELEPHONE (OUTPATIENT)
Dept: SURGICAL ONCOLOGY | Facility: CLINIC | Age: 67
End: 2022-09-08

## 2022-09-08 NOTE — TELEPHONE ENCOUNTER
LM asking patient to call back to teams number to get her back on Dr Charis Ansari' schedule  Patient was to consider completion thyroidectomy, but did not follow up

## 2022-09-09 NOTE — TELEPHONE ENCOUNTER
Patient called back and LM on my teams phone stating that she did want to schedule with Dr Tono Fernandez, that she is at work and will call back next Monday or Tuesday

## 2022-09-20 ENCOUNTER — TELEPHONE (OUTPATIENT)
Dept: SURGICAL ONCOLOGY | Facility: CLINIC | Age: 67
End: 2022-09-20

## 2022-09-22 PROBLEM — Z85.850 ENCOUNTER FOR FOLLOW-UP SURVEILLANCE OF THYROID CANCER: Status: ACTIVE | Noted: 2022-09-22

## 2022-09-22 PROBLEM — Z08 ENCOUNTER FOR FOLLOW-UP SURVEILLANCE OF THYROID CANCER: Status: ACTIVE | Noted: 2022-09-22

## 2022-09-22 PROBLEM — Z85.850 HISTORY OF THYROID CANCER: Status: ACTIVE | Noted: 2021-10-26

## 2022-09-26 ENCOUNTER — TELEPHONE (OUTPATIENT)
Dept: HEMATOLOGY ONCOLOGY | Facility: CLINIC | Age: 67
End: 2022-09-26

## 2022-09-26 NOTE — TELEPHONE ENCOUNTER
Appointment Cancellation Or Reschedule     Person calling in Patient    Provider Dr Jo Gottron   Office Visit Date and Time 09/26 at 3:45pm   Office Visit Location [place]   Did patient want to reschedule their office appointment? If so, when was it scheduled to? no   Did you have STAR scheduled for this appointment? no   Do you need STAR set up for your new appointment? If yes, please send to "PATIENT RIDESHARE" pool for STAR rescheduling no   If you are cancelling appointment, can we notify STAR to cancel ride? If yes, please send to "PATIENT RIDESHARE" pool for STAR to cancel service no   Is this patient calling to reschedule an infusion appointment? no   When is their next infusion appointment? n/a   Is this patient a Chemo patient? no   Reason for Cancellation or Reschedule Patient will call back to reschedule      If the patient is a treatment patient, please route this to the office nurse  If the patient is not on treatment, please route to the office MA  If the patient is a surgical oncology patient, please route to surg/onc clinical pool

## 2022-09-29 ENCOUNTER — TELEPHONE (OUTPATIENT)
Dept: HEMATOLOGY ONCOLOGY | Facility: CLINIC | Age: 67
End: 2022-09-29

## 2022-09-29 NOTE — TELEPHONE ENCOUNTER
CALL RETURN FORM   Reason for patient call? Patient is calling in requesting a call back regarding biopsy questions that she has    Patient's primary oncologist? Amy Joe     Name of person the patient was calling for?      Any additional information to add, if applicable? n/a   Informed patient that the message will be forwarded to the team and someone will get back to them as soon as possible    Did you relay this information to the patient?  yes, patient can be reached back at [phone]
LM for patient to call me back on my teams phone 
Improved

## 2022-10-03 ENCOUNTER — OFFICE VISIT (OUTPATIENT)
Dept: SURGICAL ONCOLOGY | Facility: CLINIC | Age: 67
End: 2022-10-03
Payer: COMMERCIAL

## 2022-10-03 VITALS
OXYGEN SATURATION: 96 % | WEIGHT: 189 LBS | BODY MASS INDEX: 32.27 KG/M2 | SYSTOLIC BLOOD PRESSURE: 152 MMHG | HEART RATE: 101 BPM | RESPIRATION RATE: 17 BRPM | TEMPERATURE: 96.8 F | DIASTOLIC BLOOD PRESSURE: 82 MMHG | HEIGHT: 64 IN

## 2022-10-03 DIAGNOSIS — Z85.850 ENCOUNTER FOR FOLLOW-UP SURVEILLANCE OF THYROID CANCER: Primary | ICD-10-CM

## 2022-10-03 DIAGNOSIS — C73 HURTHLE CELL CARCINOMA OF THYROID (HCC): ICD-10-CM

## 2022-10-03 DIAGNOSIS — Z85.850 HISTORY OF THYROID CANCER: ICD-10-CM

## 2022-10-03 DIAGNOSIS — Z08 ENCOUNTER FOR FOLLOW-UP SURVEILLANCE OF THYROID CANCER: Primary | ICD-10-CM

## 2022-10-03 PROCEDURE — 99215 OFFICE O/P EST HI 40 MIN: CPT | Performed by: SURGERY

## 2022-10-03 RX ORDER — LEVOTHYROXINE SODIUM 0.12 MG/1
125 TABLET ORAL DAILY
Qty: 30 TABLET | Refills: 3 | Status: SHIPPED | OUTPATIENT
Start: 2022-10-03

## 2022-10-03 NOTE — PROGRESS NOTES
Surgical Oncology Follow Up       [address] [facility]  [address] [facility] [address] [id number] [phone]    [facility]  1955  [id number]  [address] [facility] [place]  [address] [id number] [phone]    Chief Complaint   Patient presents with    Follow-up       Assessment/Plan:    No problem-specific Assessment & Plan notes found for this encounter  Diagnoses and all orders for this visit:    Encounter for follow-up surveillance of thyroid cancer    History of thyroid cancer        Advance Care Planning/Advance Directives:  Discussed disease status, cancer treatment plans and/or cancer treatment goals with the patient  Oncology History   History of thyroid cancer   10/12/2021 Surgery    Right hemithyroidectomy:  -  Oncocytic (Hürthle cell) carcinoma, encapsulated and angioinvasive          History of Present Illness: 78year old woman s/p right hemithyroidectomy, here to discuss completion left hemithyroidectomy  No issues or complaints since last visit   -Interval History: No voice or breathing issues  Review of Systems:  Review of Systems   Constitutional: Negative  HENT: Negative  Eyes: Negative  Respiratory: Negative  Cardiovascular: Negative  Gastrointestinal: Negative  Endocrine: Negative  Genitourinary: Negative  Musculoskeletal: Negative  Skin: Negative  Allergic/Immunologic: Negative  Neurological: Negative  Hematological: Negative  Psychiatric/Behavioral: Negative  All other systems reviewed and are negative        Patient Active Problem List   Diagnosis    GERD (gastroesophageal reflux disease)    Hypertension    Type 2 diabetes mellitus with hyperglycemia, without long-term current use of insulin (HCC)    Dilated pancreatic duct    Duodenal ulcer    Multiple thyroid nodules    Right lower lobe pulmonary nodule    Osteoarthritis of right ankle and foot    Hammer toes of both feet    Class 1 obesity due to excess calories with serious comorbidity in adult    Hepatic steatosis    Pancreatic cyst    Chronic rhinitis    Malignant melanoma of neck ([facility]    History of thyroid cancer    Encounter for follow-up surveillance of thyroid cancer     Past Medical History:   Diagnosis Date    Acute GI bleeding 10/27/2018    Diabetes mellitus ([facility] )     prediabetic     Duodenal ulcer 10/28/2018    Hypertension     Irritable bowel disease     [facility]     Melena 10/27/2018    Added automatically from request for surgery [id number]    Vertigo      Past Surgical History:   Procedure Laterality Date    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    ESOPHAGOGASTRODUODENOSCOPY N/A 10/28/2018    Procedure: ESOPHAGOGASTRODUODENOSCOPY (EGD);   Surgeon: Anitha Lerma MD;  Location:  MAIN OR;  Service: Gastroenterology    HYSTERECTOMY      [address]    OOPHORECTOMY Left     THYROID LOBECTOMY Right 10/12/2021    Procedure: LOBECTOMY THYROID, right  hemithyroidectomy;  Surgeon: Paloma Crowell MD;  Location: [facility];  Service: Surgical Oncology    US GUIDED THYROID BIOPSY  7/20/2021     Family History   Problem Relation Age of Onset    Substance Abuse Father         alcohol    Alcohol abuse Father     No Known Problems Mother     No Known Problems Daughter     No Known Problems Maternal Grandmother     Colon cancer Maternal Grandfather         age unknown    No Known Problems Paternal Grandmother     No Known Problems Paternal Grandfather     No Known Problems Maternal Aunt     No Known Problems Paternal Aunt     No Known Problems Paternal Aunt     No Known Problems Paternal Aunt     No Known Problems Paternal Aunt     No Known Problems Paternal [de-identified]     Colon cancer Cousin         19's    Mental illness Neg Hx      Social History     Socioeconomic History    Marital status:      Spouse name: Not on file    Number of children: Not on file    Years of education: Not on file    Highest education level: Not on file   Occupational History    Not on file   Tobacco Use    Smoking status: Former Smoker     Packs/day: 1 00     Years: 10 00     Pack years: 10 00     Types: Cigarettes     Quit date: 1969     Years since quittin 9    Smokeless tobacco: Never Used   Vaping Use    Vaping Use: Never used   Substance and Sexual Activity    Alcohol use: Not Currently    Drug use: Not Currently    Sexual activity: Not Currently     Partners: Male   Other Topics Concern    Not on file   Social History Narrative    Lives alone---    Feels safe at home  Has dentures  No living will  Exercises daily--exercise bike and walking to dvd         Social Determinants of Health     Financial Resource Strain: Not on file   Food Insecurity: Not on file   Transportation Needs: Not on file   Physical Activity: Not on file   Stress: Not on file   Social Connections: Not on file   Intimate Partner Violence: Not on file   Housing Stability: Not on file       Current Outpatient Medications:     acetaminophen (TYLENOL) 325 mg tablet, Take 650 mg by mouth every 6 (six) hours as needed for mild pain, Disp: , Rfl:     Ascorbic Acid (VITAMIN C) 100 MG tablet, Take 100 mg by mouth daily, Disp: , Rfl:     b complex vitamins capsule, Take 1 capsule by mouth daily, Disp: , Rfl:     Blood Glucose Monitoring Suppl (ONE TOUCH ULTRA 2) w/Device KIT, Testing 4 times daily, Disp: 1 each, Rfl: 0    Cholecalciferol (VITAMIN D3) 2000 units TABS, 1 daily, Disp: , Rfl:     glucosamine-chondroitin 500-400 MG tablet, Take 1 tablet by mouth 3 (three) times a day, Disp: , Rfl:     glucose blood (ONE TOUCH ULTRA TEST) test strip, Testing 4 times daily, Disp: 100 each, Rfl: 5    Lancets (ONETOUCH DELICA PLUS [id number]) MISC, , Disp: , Rfl:     Omega-3 Fatty Acids (FISH OIL) 1,000 mg, Take 1,000 mg by mouth daily, Disp: , Rfl:     Protein POWD, Take by mouth, Disp: , Rfl:     vitamin A 7500 UNIT capsule, Take 7,500 Units by mouth daily, Disp: , Rfl:     Zoster Vac Recomb Adjuvanted (Shingrix) 50 MCG/0 5ML SUSR, Inject 2 Doses into a muscle see administration instructions 2 doses 2- 6 months apart, Disp: 2 each, Rfl: 0    Alum Hydroxide-Mag Carbonate (GAVISCON PO), Take by mouth (Patient not taking: Reported on 10/3/2022), Disp: , Rfl:     enalapril (VASOTEC) 2 5 mg tablet, , Disp: , Rfl:     magnesium 30 MG tablet, Take 250 mg by mouth daily  (Patient not taking: Reported on 10/3/2022), Disp: , Rfl:     rosuvastatin (CRESTOR) 10 MG tablet, , Disp: , Rfl:     Rybelsus 3 MG TABS, , Disp: , Rfl:     saccharomyces boulardii (FLORASTOR) 250 mg capsule, Take 250 mg by mouth 2 (two) times a day (Patient not taking: Reported on 10/3/2022), Disp: , Rfl:     traMADol (ULTRAM) 50 mg tablet, Take 1 tablet (50 mg total) by mouth every 6 (six) hours as needed for moderate pain (Patient not taking: Reported on 10/3/2022), Disp: 10 tablet, Rfl: 0    Turmeric 500 MG CAPS, Take by mouth 1 daily (Patient not taking: Reported on 10/3/2022), Disp: , Rfl:     vitamin E, tocopherol, 1,000 units capsule, Take 1,000 Units by mouth daily (Patient not taking: Reported on 10/3/2022), Disp: , Rfl:     Zinc Sulfate (ZINC 15 PO), Take by mouth (Patient not taking: Reported on 10/3/2022), Disp: , Rfl:   Allergies   Allergen Reactions    Prednisone Anxiety    Corticosteroids Anxiety     Vitals:    10/03/22 1001   BP: 152/82   Pulse: 101   Resp: 17   Temp: (!) 96 8 °F (36 °C)   SpO2: 96%       Physical Exam  Constitutional:       Appearance: Normal appearance  HENT:      Head: Normocephalic and atraumatic  Nose: Nose normal    Eyes:      Extraocular Movements: Extraocular movements intact  Pupils: Pupils are equal, round, and reactive to light  Cardiovascular:      Rate and Rhythm: Normal rate and regular rhythm  Heart sounds: Normal heart sounds  Pulmonary:      Breath sounds: Normal breath sounds  Abdominal:      General: Abdomen is flat  Palpations: Abdomen is soft  Musculoskeletal:         General: Normal range of motion  Cervical back: Normal range of motion and neck supple  No rigidity  Lymphadenopathy:      Cervical: No cervical adenopathy  Skin:     General: Skin is dry  Neurological:      General: No focal deficit present  Mental Status: She is alert and oriented to person, place, and time  Psychiatric:         Mood and Affect: Mood normal          Behavior: Behavior normal          Thought Content: Thought content normal          Judgment: Judgment normal            Results:  Labs:  Lab Results   Component Value Date    [id number] 1 811 10/27/2018     Case Report   Surgical Pathology Report                         Case: [id number]                                    Authorizing Provider: Eros Strong MD        Collected:           10/12/2021 1424               Ordering Location:     [address]      Received:            10/12/2021 [address] [place] [address] Operating Room                                                       Pathologist:           Chiquis oTdd MD                                                         Specimen:    Thyroid, Right, right thyroid lobe                                                         Final Diagnosis   A  Thyroid, right, lobectomy:  -  Oncocytic (Hürthle cell) carcinoma, encapsulated and angioinvasive (see synoptic report)       Electronically signed by Mabel Gonzalez MD on 10/21/2021 at  8:35 AM   Note    The tumor shows oncocytic morphology with follicular growth pattern and a thick fibrous capsule  Within the capsule are multiple areas of tumor budding (pushing invasion) and intracapsular angioinvasion    The combination of findings is compatible with oncocytic carcinoma      Immunohistochemical stain performed with appropriate controls on selected tissue blocks for CD31 shows blood vessels though the foci of angioinvasion identified on H&E sections are not well preserved       Intradepartmental consultation was obtained with diagnostic agreement  Imaging  No results found  I reviewed the above laboratory and imaging data  Discussion/Summary: right sided oncocytic carcinoma, s/p hemithyroidectomy  Will plan on completion/left hemithyroidectomy with flexible laryngoscopy  Rational for this along with risks/benefits of surgery, including infection/bleeding/nerve injury/hypocalcemia discussed  All questions answered and consents signed at this visit

## 2022-10-06 ENCOUNTER — TELEPHONE (OUTPATIENT)
Dept: SURGICAL ONCOLOGY | Facility: CLINIC | Age: 67
End: 2022-10-06

## 2022-10-06 NOTE — TELEPHONE ENCOUNTER
Medical clearance form sent to Dr Genie Rosenbaum on 10/3  Fax returned indicating clearance should be for Dr Kateryna Shaw  Resent fax to Dr Lisbeth Bullard office, and called to inform them of need for medical clearance for surgery with Dr Em Garces on 12/8/22  Reception stated they had not yet received it, and to please fax again  Confirmed fax number and re-faxed  Reception will call patient to schedule pre op clearance appointment

## 2022-11-07 ENCOUNTER — HOSPITAL ENCOUNTER (OUTPATIENT)
Dept: RADIOLOGY | Facility: HOSPITAL | Age: 67
Discharge: HOME | End: 2022-11-07
Attending: SURGERY

## 2022-11-07 ENCOUNTER — OFFICE VISIT (OUTPATIENT)
Dept: LAB | Facility: HOSPITAL | Age: 67
End: 2022-11-07
Attending: SURGERY

## 2022-11-07 DIAGNOSIS — Z85.850 ENCOUNTER FOR FOLLOW-UP SURVEILLANCE OF THYROID CANCER: ICD-10-CM

## 2022-11-07 DIAGNOSIS — Z08 ENCOUNTER FOR FOLLOW-UP SURVEILLANCE OF THYROID CANCER: ICD-10-CM

## 2022-11-07 LAB
ATRIAL RATE: 76 BPM
P AXIS: 32 DEGREES
PR INTERVAL: 150 MS
QRS AXIS: 31 DEGREES
QRSD INTERVAL: 94 MS
QT INTERVAL: 370 MS
QTC INTERVAL: 416 MS
T WAVE AXIS: 29 DEGREES
VENTRICULAR RATE: 76 BPM

## 2022-11-08 LAB
ALBUMIN SERPL-MCNC: 4.3 G/DL (ref 3.8–4.8)
ALBUMIN/GLOB SERPL: 2.2 {RATIO} (ref 1.2–2.2)
ALP SERPL-CCNC: 82 IU/L (ref 44–121)
ALT SERPL-CCNC: 21 IU/L (ref 0–32)
AST SERPL-CCNC: 14 IU/L (ref 0–40)
BASOPHILS # BLD AUTO: 0.1 X10E3/UL (ref 0–0.2)
BASOPHILS NFR BLD AUTO: 1 %
BILIRUB SERPL-MCNC: 0.5 MG/DL (ref 0–1.2)
BUN SERPL-MCNC: 11 MG/DL (ref 8–27)
BUN/CREAT SERPL: 20 (ref 12–28)
CALCIUM SERPL-MCNC: 9.2 MG/DL (ref 8.7–10.3)
CHLORIDE SERPL-SCNC: 104 MMOL/L (ref 96–106)
CO2 SERPL-SCNC: 21 MMOL/L (ref 20–29)
CREAT SERPL-MCNC: 0.54 MG/DL (ref 0.57–1)
EGFR: 101 ML/MIN/1.73
EOSINOPHIL # BLD AUTO: 0.1 X10E3/UL (ref 0–0.4)
EOSINOPHIL NFR BLD AUTO: 2 %
ERYTHROCYTE [DISTWIDTH] IN BLOOD BY AUTOMATED COUNT: 12.8 % (ref 11.7–15.4)
GLOBULIN SER-MCNC: 2 G/DL (ref 1.5–4.5)
GLUCOSE SERPL-MCNC: 145 MG/DL (ref 70–99)
HCT VFR BLD AUTO: 43.1 % (ref 34–46.6)
HGB BLD-MCNC: 15 G/DL (ref 11.1–15.9)
IMM GRANULOCYTES # BLD: 0 X10E3/UL (ref 0–0.1)
IMM GRANULOCYTES NFR BLD: 0 %
LYMPHOCYTES # BLD AUTO: 2.7 X10E3/UL (ref 0.7–3.1)
LYMPHOCYTES NFR BLD AUTO: 36 %
MCH RBC QN AUTO: 30.7 PG (ref 26.6–33)
MCHC RBC AUTO-ENTMCNC: 34.8 G/DL (ref 31.5–35.7)
MCV RBC AUTO: 88 FL (ref 79–97)
MONOCYTES # BLD AUTO: 0.5 X10E3/UL (ref 0.1–0.9)
MONOCYTES NFR BLD AUTO: 6 %
NEUTROPHILS # BLD AUTO: 4.2 X10E3/UL (ref 1.4–7)
NEUTROPHILS NFR BLD AUTO: 55 %
PLATELET # BLD AUTO: 250 X10E3/UL (ref 150–450)
POTASSIUM SERPL-SCNC: 4.2 MMOL/L (ref 3.5–5.2)
PROT SERPL-MCNC: 6.3 G/DL (ref 6–8.5)
RBC # BLD AUTO: 4.88 X10E6/UL (ref 3.77–5.28)
SODIUM SERPL-SCNC: 141 MMOL/L (ref 134–144)
WBC # BLD AUTO: 7.6 X10E3/UL (ref 3.4–10.8)

## 2022-11-17 ENCOUNTER — TELEPHONE (OUTPATIENT)
Dept: SURGICAL ONCOLOGY | Facility: CLINIC | Age: 67
End: 2022-11-17

## 2022-11-17 NOTE — TELEPHONE ENCOUNTER
Spoke with patient  She provided a new PCP office, [facility], [address] [facility]  I called the office and confirmed their fax number, and made them aware of need for medical clearance   Lucía Hernandez has been added to her care team

## 2022-11-17 NOTE — TELEPHONE ENCOUNTER
Left message for patient to please give me a call back regarding medical clearance appt for surgery with Dr Jo Gottron  I have faxed a medical clearance form to Dr Shantel Asencio, and it came back with notice to please contact Dr Delmi Jarrell  I then sent clearance and contacted the office of Dr Delmi Jarrell  I have not heard back as to if an appointment was made  Just want to confirm if an appointment was scheduled, or if I need to send a clearance form to another provider for medical clearance purposes

## 2022-11-22 ENCOUNTER — TELEPHONE (OUTPATIENT)
Dept: SURGICAL ONCOLOGY | Facility: CLINIC | Age: 67
End: 2022-11-22

## 2022-11-22 ENCOUNTER — OFFICE VISIT (OUTPATIENT)
Dept: SURGICAL ONCOLOGY | Facility: CLINIC | Age: 67
End: 2022-11-22

## 2022-11-22 ENCOUNTER — TELEPHONE (OUTPATIENT)
Dept: HEMATOLOGY ONCOLOGY | Facility: CLINIC | Age: 67
End: 2022-11-22

## 2022-11-22 VITALS
BODY MASS INDEX: 31.58 KG/M2 | DIASTOLIC BLOOD PRESSURE: 90 MMHG | SYSTOLIC BLOOD PRESSURE: 152 MMHG | OXYGEN SATURATION: 96 % | WEIGHT: 185 LBS | TEMPERATURE: 98.1 F | HEIGHT: 64 IN | HEART RATE: 95 BPM

## 2022-11-22 DIAGNOSIS — Z01.818 PRE-OP EXAMINATION: ICD-10-CM

## 2022-11-22 DIAGNOSIS — C73 HURTHLE CELL CARCINOMA OF THYROID (HCC): Primary | ICD-10-CM

## 2022-11-22 RX ORDER — LISINOPRIL 20 MG/1
20 TABLET ORAL DAILY
COMMUNITY
Start: 2022-09-27

## 2022-11-22 RX ORDER — BACILLUS COAGULANS/INULIN 1B-250 MG
1 CAPSULE ORAL EVERY 24 HOURS
COMMUNITY

## 2022-11-22 RX ORDER — ASCORBIC ACID 500 MG
TABLET ORAL EVERY 24 HOURS
COMMUNITY

## 2022-11-22 RX ORDER — CEFUROXIME AXETIL 500 MG/1
500 TABLET ORAL 2 TIMES DAILY
COMMUNITY
Start: 2022-11-17

## 2022-11-22 RX ORDER — OMEPRAZOLE 40 MG/1
CAPSULE, DELAYED RELEASE ORAL
COMMUNITY
Start: 2022-10-24

## 2022-11-22 RX ORDER — PHENAZOPYRIDINE HYDROCHLORIDE 200 MG/1
TABLET, FILM COATED ORAL
COMMUNITY
Start: 2022-09-23

## 2022-11-22 NOTE — TELEPHONE ENCOUNTER
Spoke to patient and scheduled US for 11/29 at 3:30pm at [facility] location  Patient verbalized understanding and thanks

## 2022-11-22 NOTE — TELEPHONE ENCOUNTER
LM for patient asking her to call back to my teams number to schedule US head neck lymph node mapping

## 2022-11-22 NOTE — H&P (VIEW-ONLY)
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[facility] )    Pre-op examination    Other orders  -     Collagen-Vitamin C-Biotin (COLLAGEN 1500/C PO)  -     ascorbic acid (VITAMIN C) 500 MG tablet; every 24 hours  -     Bacillus Coagulans-Inulin (Probiotic) 1-250 BILLION-MG CAPS; 1 capsule every 24 hours  -     Empagliflozin (JARDIANCE) 10 MG TABS tablet; every 24 hours  -     lisinopril (ZESTRIL) 20 mg tablet; Take 20 mg by mouth daily  -     omeprazole (PriLOSEC) 40 MG capsule; TAKE 1 CAPSULE BY MOUTH ONCE DAILY 30 MINUTES BEFORE MORNING MEAL  -     sitaGLIPtin (JANUVIA) 25 mg tablet; every 24 hours  -     phenazopyridine (PYRIDIUM) 200 mg tablet; TAKE 1 TABLET BY MOUTH THREE TIMES DAILY AFTER A MEAL FOR 2 DAYS  -     cefuroxime (CEFTIN) 500 mg tablet; Take 500 mg by mouth 2 (two) times a day        Chief Complaint   Patient presents with   • Pre-op Exam       No follow-ups on file  Oncology History   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[facility] )   10/12/2021 Surgery    Right hemithyroidectomy:  -  Oncocytic (Hürthle cell) carcinoma, encapsulated and angioinvasive            History of Present Illness: The patient returns to the office today for pre-operative history and physical examination  She is scheduled to undergo completion thyroidectomy on December 8, 2022, with Dr Sang Ruiz  She denies any changes since her last visit in the office  She has completed all pre-admission testing, and is scheduled for a pre-op medical clearance appointment with her PCP in 10 days  Review of Systems   Constitutional: Negative    Negative for activity change, appetite change, chills, fatigue, fever and unexpected weight change  HENT: Negative  Eyes: Negative  Respiratory: Negative  Negative for cough and shortness of breath  Cardiovascular: Negative  Gastrointestinal: Negative  Endocrine: Negative  Genitourinary: Negative  Musculoskeletal: Negative  Skin: Negative  Allergic/Immunologic: Negative  Neurological: Negative  Hematological: Negative  Negative for adenopathy  Psychiatric/Behavioral: Negative  Patient Active Problem List   Diagnosis   • GERD (gastroesophageal reflux disease)   • Hypertension   • Type 2 diabetes mellitus with hyperglycemia, without long-term current use of insulin ([facility] )   • Dilated pancreatic duct   • Duodenal ulcer   • Multiple thyroid nodules   • Right lower lobe pulmonary nodule   • Osteoarthritis of right ankle and foot   • Hammer toes of both feet   • Class 1 obesity due to excess calories with serious comorbidity in adult   • Hepatic steatosis   • Pancreatic cyst   • Chronic rhinitis   • Malignant melanoma of neck (HCC)   • 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[facility] )   • Encounter for follow-up surveillance of thyroid cancer     Past Medical History:   Diagnosis Date   • Acute GI bleeding 10/27/2018   • Diabetes mellitus ([facility] )     prediabetic    • Duodenal ulcer 10/28/2018   • Hypertension    • Irritable bowel disease    • Melanoma ([facility] )    • Melena 10/27/2018    Added automatically from request for surgery [id number]   • Vertigo      Past Surgical History:   Procedure Laterality Date   • 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   • ESOPHAGOGASTRODUODENOSCOPY N/A 10/28/2018    Procedure: ESOPHAGOGASTRODUODENOSCOPY (EGD);   Surgeon: Yogesh Sands MD;  Location: [facility] OR;  Service: Gastroenterology   • HYSTERECTOMY      6214'A   • OOPHORECTOMY Left    • THYROID LOBECTOMY Right 10/12/2021    Procedure: LOBECTOMY THYROID, right  hemithyroidectomy;  Surgeon: Beryle Shih Pj Garcia MD;  Location: [facility];  Service: Surgical Oncology   • US GUIDED THYROID BIOPSY  2021     Family History   Problem Relation Age of Onset   • Substance Abuse Father         alcohol   • Alcohol abuse Father    • No Known Problems Mother    • No Known Problems Daughter    • No Known Problems Maternal Grandmother    • Colon cancer Maternal Grandfather         age unknown   • No Known Problems Paternal Grandmother    • No Known Problems Paternal Grandfather    • No Known Problems Maternal Aunt    • No Known Problems Paternal Aunt    • No Known Problems Paternal Aunt    • No Known Problems Paternal Aunt    • No Known Problems Paternal Aunt    • No Known Problems Paternal Aunt    • Colon cancer Cousin         19's   • Mental illness Neg Hx      Social History     Socioeconomic History   • Marital status:      Spouse name: Not on file   • Number of children: Not on file   • Years of education: Not on file   • Highest education level: Not on file   Occupational History   • Not on file   Tobacco Use   • Smoking status: Former     Packs/day: 1 00     Years: 10 00     Pack years: 10 00     Types: Cigarettes     Quit date: 1969     Years since quittin 0   • Smokeless tobacco: Never   Vaping Use   • Vaping Use: Never used   Substance and Sexual Activity   • Alcohol use: Not Currently   • Drug use: Not Currently   • Sexual activity: Not Currently     Partners: Male   Other Topics Concern   • Not on file   Social History Narrative    Lives alone---    Feels safe at home  Has dentures  No living will  Exercises daily--exercise bike and walking to dvd         Social Determinants of Health     Financial Resource Strain: Not on file   Food Insecurity: Not on file   Transportation Needs: Not on file   Physical Activity: Not on file   Stress: Not on file   Social Connections: Not on file   Intimate Partner Violence: Not on file   Housing Stability: Not on file       Current Outpatient Medications:   •  acetaminophen (TYLENOL) 325 mg tablet, Take 650 mg by mouth every 6 (six) hours as needed for mild pain, Disp: , Rfl:   •  Ascorbic Acid (VITAMIN C) 100 MG tablet, Take 100 mg by mouth daily, Disp: , Rfl:   •  ascorbic acid (VITAMIN C) 500 MG tablet, every 24 hours, Disp: , Rfl:   •  b complex vitamins capsule, Take 1 capsule by mouth daily, Disp: , Rfl:   •  Bacillus Coagulans-Inulin (Probiotic) 1-250 BILLION-MG CAPS, 1 capsule every 24 hours, Disp: , Rfl:   •  Blood Glucose Monitoring Suppl (ONE TOUCH ULTRA 2) w/Device KIT, Testing 4 times daily, Disp: 1 each, Rfl: 0  •  cefuroxime (CEFTIN) 500 mg tablet, Take 500 mg by mouth 2 (two) times a day, Disp: , Rfl:   •  Cholecalciferol (VITAMIN D3) 2000 units TABS, 1 daily, Disp: , Rfl:   •  Collagen-Vitamin C-Biotin (COLLAGEN 1500/C PO), , Disp: , Rfl:   •  Empagliflozin (JARDIANCE) 10 MG TABS tablet, every 24 hours, Disp: , Rfl:   •  glucosamine-chondroitin 500-400 MG tablet, Take 1 tablet by mouth 3 (three) times a day, Disp: , Rfl:   •  glucose blood (ONE TOUCH ULTRA TEST) test strip, Testing 4 times daily, Disp: 100 each, Rfl: 5  •  Lancets (ONETOUCH DELICA PLUS [id number]) MISC, , Disp: , Rfl:   •  lisinopril (ZESTRIL) 20 mg tablet, Take 20 mg by mouth daily, Disp: , Rfl:   •  Omega-3 Fatty Acids (FISH OIL) 1,000 mg, Take 1,000 mg by mouth daily, Disp: , Rfl:   •  Protein POWD, Take by mouth, Disp: , Rfl:   •  sitaGLIPtin (JANUVIA) 25 mg tablet, every 24 hours, Disp: , Rfl:   •  vitamin A 7500 UNIT capsule, Take 7,500 Units by mouth daily, Disp: , Rfl:   •  Alum Hydroxide-Mag Carbonate (GAVISCON PO), Take by mouth (Patient not taking: Reported on 10/3/2022), Disp: , Rfl:   •  levothyroxine (Synthroid) 125 mcg tablet, Take 1 tablet (125 mcg total) by mouth daily (Patient not taking: Reported on 11/22/2022), Disp: 30 tablet, Rfl: 3  •  magnesium 30 MG tablet, Take 250 mg by mouth daily  (Patient not taking: Reported on 10/3/2022), Disp: , Rfl:   • omeprazole (PriLOSEC) 40 MG capsule, TAKE 1 CAPSULE BY MOUTH ONCE DAILY 30 MINUTES BEFORE MORNING MEAL, Disp: , Rfl:   •  phenazopyridine (PYRIDIUM) 200 mg tablet, TAKE 1 TABLET BY MOUTH THREE TIMES DAILY AFTER A MEAL FOR 2 DAYS, Disp: , Rfl:   •  rosuvastatin (CRESTOR) 10 MG tablet, , Disp: , Rfl:   •  saccharomyces boulardii (FLORASTOR) 250 mg capsule, Take 250 mg by mouth 2 (two) times a day (Patient not taking: Reported on 10/3/2022), Disp: , Rfl:   •  traMADol (ULTRAM) 50 mg tablet, Take 1 tablet (50 mg total) by mouth every 6 (six) hours as needed for moderate pain (Patient not taking: Reported on 10/3/2022), Disp: 10 tablet, Rfl: 0  •  Turmeric 500 MG CAPS, Take by mouth 1 daily (Patient not taking: Reported on 10/3/2022), Disp: , Rfl:   •  vitamin E, tocopherol, 1,000 units capsule, Take 1,000 Units by mouth daily (Patient not taking: Reported on 10/3/2022), Disp: , Rfl:   •  Zinc Sulfate (ZINC 15 PO), Take by mouth (Patient not taking: Reported on 10/3/2022), Disp: , Rfl:   •  Zoster Vac Recomb Adjuvanted (Shingrix) 50 MCG/0 5ML SUSR, Inject 2 Doses into a muscle see administration instructions 2 doses 2- 6 months apart (Patient not taking: Reported on 11/22/2022), Disp: 2 each, Rfl: 0  Allergies   Allergen Reactions   • Prednisone Anxiety   • Metformin Abdominal Pain   • Corticosteroids Anxiety     Vitals:    11/22/22 0852   BP: 152/90   Pulse: 95   Temp: 98 1 °F (36 7 °C)   SpO2: 96%       Physical Exam  Vitals reviewed  Constitutional:       General: She is not in acute distress  Appearance: Normal appearance  She is normal weight  She is not ill-appearing or toxic-appearing  HENT:      Head: Normocephalic and atraumatic  Nose: Nose normal       Mouth/Throat:      Mouth: Mucous membranes are moist    Eyes:      General: No scleral icterus  Extraocular Movements: Extraocular movements intact        Conjunctiva/sclera: Conjunctivae normal       Pupils: Pupils are equal, round, and reactive to light  Neck:      Vascular: No carotid bruit  Cardiovascular:      Rate and Rhythm: Normal rate and regular rhythm  Pulmonary:      Effort: Pulmonary effort is normal       Breath sounds: Normal breath sounds  Musculoskeletal:         General: Normal range of motion  Cervical back: Normal range of motion and neck supple  Skin:     General: Skin is warm and dry  Neurological:      General: No focal deficit present  Mental Status: She is alert and oriented to person, place, and time  Psychiatric:         Mood and Affect: Mood normal          Behavior: Behavior normal          Thought Content:  Thought content normal          Judgment: Judgment normal            Labs:  Collected Updated Procedure    11/07/2022 1137 11/08/2022 0606 Comprehensive metabolic panel [[id number]]    (Abnormal)    Component Value Units   Glucose, Random 145 High  mg/dL   BUN 11 mg/dL   Creatinine 0 54 Low  mg/dL   eGFR 101 mL/min/1 73   SL AMB BUN/CREATININE RATIO 20    Sodium 141 mmol/L   Potassium 4 2 mmol/L   Chloride 104 mmol/L   CO2 21 mmol/L   CALCIUM 9 2 mg/dL   Protein, Total 6 3 g/dL   Albumin 4 3 g/dL   Globulin, Total 2 0 g/dL   Albumin/Globulin Ratio 2 2    TOTAL BILIRUBIN 0 5 mg/dL   Alk Phos Isoenzymes 82 IU/L   AST 14 IU/L   ALT 21 IU/L          11/07/2022 1137 11/08/2022 0606 CBC and differential [[id number]]  Component Value Units   White Blood Cell Count 7 6 x10E3/uL   Red Blood Cell Count 4 88 x10E6/uL   Hemoglobin 15 0 g/dL   HCT 43 1 %   MCV 88 fL   MCH 30 7 pg   MCHC 34 8 g/dL   RDW 12 8 %   Platelet Count 522 B94N1/MG   Neutrophils 55 %   Lymphocytes 36 %   Monocytes 6 %   Eosinophils 2 %   Basophils PCT 1 %   Neutrophils (Absolute) 4 2 x10E3/uL   Lymphocytes (Absolute) 2 7 x10E3/uL   Monocytes (Absolute) 0 5 x10E3/uL   Eosinophils (Absolute) 0 1 x10E3/uL   Basophils ABS 0 1 x10E3/uL   Immature Granulocytes 0 %   Immature Granulocytes (Absolute) 0 0 x10E3/uL                Imaging  XR chest pa & lateral    Result Date: 11/9/2022  Narrative: CHEST INDICATION:   Z08: Encounter for follow-up examination after completed treatment for malignant neoplasm Z85 850: Personal history of malignant neoplasm of thyroid  COMPARISON:  CXR 5/20/2021 and chest CT 6/8/2021  EXAM PERFORMED/VIEWS:  XR CHEST PA & LATERAL  DUAL ENERGY SUBTRACTION  FINDINGS: Cardiomediastinal silhouette appears unremarkable  Clips in the right neck from thyroid surgery  The lungs are clear  No pneumothorax or pleural effusion  Mild curvature of the spine  Impression: No acute cardiopulmonary disease  Workstation performed: [id number]    ECG 12 lead  Status: Final result   11/07/2022  Narrative & Impression   Sinus rhythm with occasional Premature ventricular complexes  Low voltage QRS  Septal infarct , age undetermined  Abnormal ECG  When compared with ECG of 04-SEP-2021 10:57,  Premature ventricular complexes are now Present  Septal infarct is now Present  Confirmed by Seven Faulkner ([id number]) on 11/7/2022 3:21:24 PM          I reviewed the above laboratory and imaging data  Discussion/Summary: This is a pleasant 79 y/o female who presents today for pre-op H&P  There are no new or worrisome findings on exam today  Her pre-op EKG was abnormal, and I have forwarded a copy of the study to her PCP in preparation for her clearance appointment  She has been instructed on brian-operative medication management  All questions were answered today  After reviewing her chart, I will also order a repeat US of the head and neck with lymph node mapping

## 2022-11-22 NOTE — PROGRESS NOTES
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[facility] )    Pre-op examination    Other orders  -     Collagen-Vitamin C-Biotin (COLLAGEN 1500/C PO)  -     ascorbic acid (VITAMIN C) 500 MG tablet; every 24 hours  -     Bacillus Coagulans-Inulin (Probiotic) 1-250 BILLION-MG CAPS; 1 capsule every 24 hours  -     Empagliflozin (JARDIANCE) 10 MG TABS tablet; every 24 hours  -     lisinopril (ZESTRIL) 20 mg tablet; Take 20 mg by mouth daily  -     omeprazole (PriLOSEC) 40 MG capsule; TAKE 1 CAPSULE BY MOUTH ONCE DAILY 30 MINUTES BEFORE MORNING MEAL  -     sitaGLIPtin (JANUVIA) 25 mg tablet; every 24 hours  -     phenazopyridine (PYRIDIUM) 200 mg tablet; TAKE 1 TABLET BY MOUTH THREE TIMES DAILY AFTER A MEAL FOR 2 DAYS  -     cefuroxime (CEFTIN) 500 mg tablet; Take 500 mg by mouth 2 (two) times a day        Chief Complaint   Patient presents with   • Pre-op Exam       No follow-ups on file  Oncology History   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[facility] )   10/12/2021 Surgery    Right hemithyroidectomy:  -  Oncocytic (Hürthle cell) carcinoma, encapsulated and angioinvasive            History of Present Illness: The patient returns to the office today for pre-operative history and physical examination  She is scheduled to undergo completion thyroidectomy on December 8, 2022, with Dr Manuel Zhang  She denies any changes since her last visit in the office  She has completed all pre-admission testing, and is scheduled for a pre-op medical clearance appointment with her PCP in 10 days  Review of Systems   Constitutional: Negative    Negative for activity change, appetite change, chills, fatigue, fever and unexpected weight change  HENT: Negative  Eyes: Negative  Respiratory: Negative  Negative for cough and shortness of breath  Cardiovascular: Negative  Gastrointestinal: Negative  Endocrine: Negative  Genitourinary: Negative  Musculoskeletal: Negative  Skin: Negative  Allergic/Immunologic: Negative  Neurological: Negative  Hematological: Negative  Negative for adenopathy  Psychiatric/Behavioral: Negative  Patient Active Problem List   Diagnosis   • GERD (gastroesophageal reflux disease)   • Hypertension   • Type 2 diabetes mellitus with hyperglycemia, without long-term current use of insulin ([facility] )   • Dilated pancreatic duct   • Duodenal ulcer   • Multiple thyroid nodules   • Right lower lobe pulmonary nodule   • Osteoarthritis of right ankle and foot   • Hammer toes of both feet   • Class 1 obesity due to excess calories with serious comorbidity in adult   • Hepatic steatosis   • Pancreatic cyst   • Chronic rhinitis   • Malignant melanoma of neck (HCC)   • 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[facility] )   • Encounter for follow-up surveillance of thyroid cancer     Past Medical History:   Diagnosis Date   • Acute GI bleeding 10/27/2018   • Diabetes mellitus ([facility] )     prediabetic    • Duodenal ulcer 10/28/2018   • Hypertension    • Irritable bowel disease    • Melanoma ([facility] )    • Melena 10/27/2018    Added automatically from request for surgery [id number]   • Vertigo      Past Surgical History:   Procedure Laterality Date   • 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   • ESOPHAGOGASTRODUODENOSCOPY N/A 10/28/2018    Procedure: ESOPHAGOGASTRODUODENOSCOPY (EGD);   Surgeon: Alexa Sebastian MD;  Location: [facility] OR;  Service: Gastroenterology   • HYSTERECTOMY      6601'C   • OOPHORECTOMY Left    • THYROID LOBECTOMY Right 10/12/2021    Procedure: LOBECTOMY THYROID, right  hemithyroidectomy;  Surgeon: Og Ochoa Chi Hebert MD;  Location: [facility];  Service: Surgical Oncology   • US GUIDED THYROID BIOPSY  2021     Family History   Problem Relation Age of Onset   • Substance Abuse Father         alcohol   • Alcohol abuse Father    • No Known Problems Mother    • No Known Problems Daughter    • No Known Problems Maternal Grandmother    • Colon cancer Maternal Grandfather         age unknown   • No Known Problems Paternal Grandmother    • No Known Problems Paternal Grandfather    • No Known Problems Maternal Aunt    • No Known Problems Paternal Aunt    • No Known Problems Paternal Aunt    • No Known Problems Paternal Aunt    • No Known Problems Paternal Aunt    • No Known Problems Paternal Aunt    • Colon cancer Cousin         19's   • Mental illness Neg Hx      Social History     Socioeconomic History   • Marital status:      Spouse name: Not on file   • Number of children: Not on file   • Years of education: Not on file   • Highest education level: Not on file   Occupational History   • Not on file   Tobacco Use   • Smoking status: Former     Packs/day: 1 00     Years: 10 00     Pack years: 10 00     Types: Cigarettes     Quit date: 1969     Years since quittin 0   • Smokeless tobacco: Never   Vaping Use   • Vaping Use: Never used   Substance and Sexual Activity   • Alcohol use: Not Currently   • Drug use: Not Currently   • Sexual activity: Not Currently     Partners: Male   Other Topics Concern   • Not on file   Social History Narrative    Lives alone---    Feels safe at home  Has dentures  No living will  Exercises daily--exercise bike and walking to dvd         Social Determinants of Health     Financial Resource Strain: Not on file   Food Insecurity: Not on file   Transportation Needs: Not on file   Physical Activity: Not on file   Stress: Not on file   Social Connections: Not on file   Intimate Partner Violence: Not on file   Housing Stability: Not on file       Current Outpatient Medications:   •  acetaminophen (TYLENOL) 325 mg tablet, Take 650 mg by mouth every 6 (six) hours as needed for mild pain, Disp: , Rfl:   •  Ascorbic Acid (VITAMIN C) 100 MG tablet, Take 100 mg by mouth daily, Disp: , Rfl:   •  ascorbic acid (VITAMIN C) 500 MG tablet, every 24 hours, Disp: , Rfl:   •  b complex vitamins capsule, Take 1 capsule by mouth daily, Disp: , Rfl:   •  Bacillus Coagulans-Inulin (Probiotic) 1-250 BILLION-MG CAPS, 1 capsule every 24 hours, Disp: , Rfl:   •  Blood Glucose Monitoring Suppl (ONE TOUCH ULTRA 2) w/Device KIT, Testing 4 times daily, Disp: 1 each, Rfl: 0  •  cefuroxime (CEFTIN) 500 mg tablet, Take 500 mg by mouth 2 (two) times a day, Disp: , Rfl:   •  Cholecalciferol (VITAMIN D3) 2000 units TABS, 1 daily, Disp: , Rfl:   •  Collagen-Vitamin C-Biotin (COLLAGEN 1500/C PO), , Disp: , Rfl:   •  Empagliflozin (JARDIANCE) 10 MG TABS tablet, every 24 hours, Disp: , Rfl:   •  glucosamine-chondroitin 500-400 MG tablet, Take 1 tablet by mouth 3 (three) times a day, Disp: , Rfl:   •  glucose blood (ONE TOUCH ULTRA TEST) test strip, Testing 4 times daily, Disp: 100 each, Rfl: 5  •  Lancets (ONETOUCH DELICA PLUS [id number]) MISC, , Disp: , Rfl:   •  lisinopril (ZESTRIL) 20 mg tablet, Take 20 mg by mouth daily, Disp: , Rfl:   •  Omega-3 Fatty Acids (FISH OIL) 1,000 mg, Take 1,000 mg by mouth daily, Disp: , Rfl:   •  Protein POWD, Take by mouth, Disp: , Rfl:   •  sitaGLIPtin (JANUVIA) 25 mg tablet, every 24 hours, Disp: , Rfl:   •  vitamin A 7500 UNIT capsule, Take 7,500 Units by mouth daily, Disp: , Rfl:   •  Alum Hydroxide-Mag Carbonate (GAVISCON PO), Take by mouth (Patient not taking: Reported on 10/3/2022), Disp: , Rfl:   •  levothyroxine (Synthroid) 125 mcg tablet, Take 1 tablet (125 mcg total) by mouth daily (Patient not taking: Reported on 11/22/2022), Disp: 30 tablet, Rfl: 3  •  magnesium 30 MG tablet, Take 250 mg by mouth daily  (Patient not taking: Reported on 10/3/2022), Disp: , Rfl:   • omeprazole (PriLOSEC) 40 MG capsule, TAKE 1 CAPSULE BY MOUTH ONCE DAILY 30 MINUTES BEFORE MORNING MEAL, Disp: , Rfl:   •  phenazopyridine (PYRIDIUM) 200 mg tablet, TAKE 1 TABLET BY MOUTH THREE TIMES DAILY AFTER A MEAL FOR 2 DAYS, Disp: , Rfl:   •  rosuvastatin (CRESTOR) 10 MG tablet, , Disp: , Rfl:   •  saccharomyces boulardii (FLORASTOR) 250 mg capsule, Take 250 mg by mouth 2 (two) times a day (Patient not taking: Reported on 10/3/2022), Disp: , Rfl:   •  traMADol (ULTRAM) 50 mg tablet, Take 1 tablet (50 mg total) by mouth every 6 (six) hours as needed for moderate pain (Patient not taking: Reported on 10/3/2022), Disp: 10 tablet, Rfl: 0  •  Turmeric 500 MG CAPS, Take by mouth 1 daily (Patient not taking: Reported on 10/3/2022), Disp: , Rfl:   •  vitamin E, tocopherol, 1,000 units capsule, Take 1,000 Units by mouth daily (Patient not taking: Reported on 10/3/2022), Disp: , Rfl:   •  Zinc Sulfate (ZINC 15 PO), Take by mouth (Patient not taking: Reported on 10/3/2022), Disp: , Rfl:   •  Zoster Vac Recomb Adjuvanted (Shingrix) 50 MCG/0 5ML SUSR, Inject 2 Doses into a muscle see administration instructions 2 doses 2- 6 months apart (Patient not taking: Reported on 11/22/2022), Disp: 2 each, Rfl: 0  Allergies   Allergen Reactions   • Prednisone Anxiety   • Metformin Abdominal Pain   • Corticosteroids Anxiety     Vitals:    11/22/22 0852   BP: 152/90   Pulse: 95   Temp: 98 1 °F (36 7 °C)   SpO2: 96%       Physical Exam  Vitals reviewed  Constitutional:       General: She is not in acute distress  Appearance: Normal appearance  She is normal weight  She is not ill-appearing or toxic-appearing  HENT:      Head: Normocephalic and atraumatic  Nose: Nose normal       Mouth/Throat:      Mouth: Mucous membranes are moist    Eyes:      General: No scleral icterus  Extraocular Movements: Extraocular movements intact        Conjunctiva/sclera: Conjunctivae normal       Pupils: Pupils are equal, round, and reactive to light  Neck:      Vascular: No carotid bruit  Cardiovascular:      Rate and Rhythm: Normal rate and regular rhythm  Pulmonary:      Effort: Pulmonary effort is normal       Breath sounds: Normal breath sounds  Musculoskeletal:         General: Normal range of motion  Cervical back: Normal range of motion and neck supple  Skin:     General: Skin is warm and dry  Neurological:      General: No focal deficit present  Mental Status: She is alert and oriented to person, place, and time  Psychiatric:         Mood and Affect: Mood normal          Behavior: Behavior normal          Thought Content:  Thought content normal          Judgment: Judgment normal            Labs:  Collected Updated Procedure    11/07/2022 1137 11/08/2022 0606 Comprehensive metabolic panel [[id number]]    (Abnormal)    Component Value Units   Glucose, Random 145 High  mg/dL   BUN 11 mg/dL   Creatinine 0 54 Low  mg/dL   eGFR 101 mL/min/1 73   SL AMB BUN/CREATININE RATIO 20    Sodium 141 mmol/L   Potassium 4 2 mmol/L   Chloride 104 mmol/L   CO2 21 mmol/L   CALCIUM 9 2 mg/dL   Protein, Total 6 3 g/dL   Albumin 4 3 g/dL   Globulin, Total 2 0 g/dL   Albumin/Globulin Ratio 2 2    TOTAL BILIRUBIN 0 5 mg/dL   Alk Phos Isoenzymes 82 IU/L   AST 14 IU/L   ALT 21 IU/L          11/07/2022 1137 11/08/2022 0606 CBC and differential [[id number]]  Component Value Units   White Blood Cell Count 7 6 x10E3/uL   Red Blood Cell Count 4 88 x10E6/uL   Hemoglobin 15 0 g/dL   HCT 43 1 %   MCV 88 fL   MCH 30 7 pg   MCHC 34 8 g/dL   RDW 12 8 %   Platelet Count 313 S84N9/NS   Neutrophils 55 %   Lymphocytes 36 %   Monocytes 6 %   Eosinophils 2 %   Basophils PCT 1 %   Neutrophils (Absolute) 4 2 x10E3/uL   Lymphocytes (Absolute) 2 7 x10E3/uL   Monocytes (Absolute) 0 5 x10E3/uL   Eosinophils (Absolute) 0 1 x10E3/uL   Basophils ABS 0 1 x10E3/uL   Immature Granulocytes 0 %   Immature Granulocytes (Absolute) 0 0 x10E3/uL                Imaging  XR chest pa & lateral    Result Date: 11/9/2022  Narrative: CHEST INDICATION:   Z08: Encounter for follow-up examination after completed treatment for malignant neoplasm Z85 850: Personal history of malignant neoplasm of thyroid  COMPARISON:  CXR 5/20/2021 and chest CT 6/8/2021  EXAM PERFORMED/VIEWS:  XR CHEST PA & LATERAL  DUAL ENERGY SUBTRACTION  FINDINGS: Cardiomediastinal silhouette appears unremarkable  Clips in the right neck from thyroid surgery  The lungs are clear  No pneumothorax or pleural effusion  Mild curvature of the spine  Impression: No acute cardiopulmonary disease  Workstation performed: [id number]    ECG 12 lead  Status: Final result   11/07/2022  Narrative & Impression   Sinus rhythm with occasional Premature ventricular complexes  Low voltage QRS  Septal infarct , age undetermined  Abnormal ECG  When compared with ECG of 04-SEP-2021 10:57,  Premature ventricular complexes are now Present  Septal infarct is now Present  Confirmed by Pal Jose ([id number]) on 11/7/2022 3:21:24 PM          I reviewed the above laboratory and imaging data  Discussion/Summary: This is a pleasant 77 y/o female who presents today for pre-op H&P  There are no new or worrisome findings on exam today  Her pre-op EKG was abnormal, and I have forwarded a copy of the study to her PCP in preparation for her clearance appointment  She has been instructed on brian-operative medication management  All questions were answered today  After reviewing her chart, I will also order a repeat US of the head and neck with lymph node mapping

## 2022-11-22 NOTE — TELEPHONE ENCOUNTER
CALL TRANSFER   Reason for patient call? Patient returning call   Patient's primary physician? Umair Reed   RN call was transferred to and time it was transferred? Yolette Mario 12:47PM 11/22   Informed patient that the message will be forwarded to the team and someone will get back to them as soon as possible    Did you relay this information to the patient?   yes

## 2022-11-29 ENCOUNTER — HOSPITAL ENCOUNTER (OUTPATIENT)
Dept: ULTRASOUND IMAGING | Facility: HOSPITAL | Age: 67
Discharge: HOME | End: 2022-11-29

## 2022-11-29 DIAGNOSIS — C73 HURTHLE CELL CARCINOMA OF THYROID (HCC): ICD-10-CM

## 2022-11-30 ENCOUNTER — ANESTHESIA EVENT (OUTPATIENT)
Dept: PERIOP | Facility: HOSPITAL | Age: 67
End: 2022-11-30

## 2022-11-30 ENCOUNTER — TELEPHONE (OUTPATIENT)
Dept: SURGICAL ONCOLOGY | Facility: CLINIC | Age: 67
End: 2022-11-30

## 2022-11-30 NOTE — TELEPHONE ENCOUNTER
Patient LM on teams number  I called back and LM answering questions about radiation after her surgery on 12/8  Patient encouraged to call back if she has any further questions

## 2022-12-01 ENCOUNTER — TELEPHONE (OUTPATIENT)
Dept: SURGICAL ONCOLOGY | Facility: CLINIC | Age: 67
End: 2022-12-01

## 2022-12-01 NOTE — TELEPHONE ENCOUNTER
Darcy from patient's PCP called asking for results of EKG, Chest x-ray and blood work to be faxed to [phone]  Reports were faxed

## 2022-12-02 ENCOUNTER — TELEPHONE (OUTPATIENT)
Dept: SURGICAL ONCOLOGY | Facility: CLINIC | Age: 67
End: 2022-12-02

## 2022-12-02 NOTE — TELEPHONE ENCOUNTER
LM for patient letting her know that she should continue to take all of her medications until the day of surgery  The PAT nurse will go over all of her medications and she will probably be allowed to take her blood pressure medication the day of surgery with a small sip of water  Teams number given for her to call back and confirm that she understands this

## 2022-12-05 ENCOUNTER — TELEPHONE (OUTPATIENT)
Dept: SURGICAL ONCOLOGY | Facility: CLINIC | Age: 67
End: 2022-12-05

## 2022-12-05 NOTE — TELEPHONE ENCOUNTER
Patient returned call and states that she has been taking all of her medications again since she heard my message  PAT nurse is scheduled to call her tomorrow  Patient agrees to call back if the changed post op appt will not work for her  All questions answered

## 2022-12-06 NOTE — PRE-PROCEDURE INSTRUCTIONS
Pre-Surgery Instructions:   Medication Instructions   • acetaminophen (TYLENOL) 325 mg tablet Uses PRN- OK to take day of surgery   • ascorbic acid (VITAMIN C) 500 MG tablet Stop taking 7 days prior to surgery  • b complex vitamins capsule Stop taking 7 days prior to surgery  • Bacillus Coagulans-Inulin (Probiotic) 1-250 BILLION-MG CAPS Hold day of surgery  • Cholecalciferol (VITAMIN D3) 2000 units TABS Stop taking 7 days prior to surgery  • Collagen-Vitamin C-Biotin (COLLAGEN 1500/C PO) Stop taking 7 days prior to surgery  • Empagliflozin (JARDIANCE) 10 MG TABS tablet Hold day of surgery  • glucosamine-chondroitin 500-400 MG tablet Stop taking 7 days prior to surgery  • lisinopril (ZESTRIL) 20 mg tablet Hold day of surgery  • Omega-3 Fatty Acids (FISH OIL) 1,000 mg Stop taking 7 days prior to surgery  • omeprazole (PriLOSEC) 40 MG capsule Take day of surgery  • sitaGLIPtin (JANUVIA) 25 mg tablet Hold day of surgery  • vitamin A 7500 UNIT capsule Stop taking 7 days prior to surgery  - Reviewed with patient, in detail, instructions from "My Surgical Experience"  - Instructed to avoid all OTC vitamins/supplements and NSAIDS for one week prior to surgery per anesthesia guidelines  Tylenol ok to take PRN  - Advised patient nothing eat or drink after midnight prior to surgery, except medications that he/she is to take morning DOS with only small sip of water     - Advised patient that [facility] will call with surgery arrival time and hospital directions the business day prior to surgery  Patient verbalized understanding of current visitor restrictions/masking guidelines and advised that he/she can confirm these at time of arrival call with [facility]      - Patient verbalized understanding and knows to call surgeon's office with any additional questions prior to surgery  Instructed to call surgeon's office in meantime with any new illnesses/exposure, patient verbalized understanding

## 2022-12-08 ENCOUNTER — HOSPITAL ENCOUNTER (OUTPATIENT)
Facility: HOSPITAL | Age: 67
Setting detail: OUTPATIENT SURGERY
Discharge: HOME | End: 2022-12-08
Attending: SURGERY | Admitting: SURGERY

## 2022-12-08 ENCOUNTER — ANESTHESIA (OUTPATIENT)
Dept: PERIOP | Facility: HOSPITAL | Age: 67
End: 2022-12-08

## 2022-12-08 VITALS
RESPIRATION RATE: 18 BRPM | BODY MASS INDEX: 31.41 KG/M2 | WEIGHT: 184 LBS | DIASTOLIC BLOOD PRESSURE: 80 MMHG | SYSTOLIC BLOOD PRESSURE: 141 MMHG | HEART RATE: 83 BPM | HEIGHT: 64 IN | TEMPERATURE: 98.1 F | OXYGEN SATURATION: 92 %

## 2022-12-08 DIAGNOSIS — Z85.850 HISTORY OF THYROID CANCER: ICD-10-CM

## 2022-12-08 DIAGNOSIS — Z08 ENCOUNTER FOR FOLLOW-UP SURVEILLANCE OF THYROID CANCER: ICD-10-CM

## 2022-12-08 DIAGNOSIS — Z85.850 ENCOUNTER FOR FOLLOW-UP SURVEILLANCE OF THYROID CANCER: ICD-10-CM

## 2022-12-08 LAB
CALCIUM SERPL-MCNC: 8.9 MG/DL (ref 8.3–10.1)
CALCIUM SERPL-MCNC: 9.2 MG/DL (ref 8.3–10.1)
CALCIUM SERPL-MCNC: 9.4 MG/DL (ref 8.3–10.1)
GLUCOSE SERPL-MCNC: 169 MG/DL (ref 65–140)
GLUCOSE SERPL-MCNC: 185 MG/DL (ref 65–140)

## 2022-12-08 RX ORDER — ONDANSETRON 2 MG/ML
INJECTION INTRAMUSCULAR; INTRAVENOUS AS NEEDED
Status: DISCONTINUED | OUTPATIENT
Start: 2022-12-08 | End: 2022-12-08

## 2022-12-08 RX ORDER — SODIUM CHLORIDE, SODIUM LACTATE, POTASSIUM CHLORIDE, CALCIUM CHLORIDE 600; 310; 30; 20 MG/100ML; MG/100ML; MG/100ML; MG/100ML
50 INJECTION, SOLUTION INTRAVENOUS CONTINUOUS
Status: DISCONTINUED | OUTPATIENT
Start: 2022-12-08 | End: 2022-12-08 | Stop reason: HOSPADM

## 2022-12-08 RX ORDER — ONDANSETRON 2 MG/ML
4 INJECTION INTRAMUSCULAR; INTRAVENOUS ONCE AS NEEDED
Status: DISCONTINUED | OUTPATIENT
Start: 2022-12-08 | End: 2022-12-08 | Stop reason: HOSPADM

## 2022-12-08 RX ORDER — ROCURONIUM BROMIDE 10 MG/ML
INJECTION, SOLUTION INTRAVENOUS AS NEEDED
Status: DISCONTINUED | OUTPATIENT
Start: 2022-12-08 | End: 2022-12-08

## 2022-12-08 RX ORDER — GLYCOPYRROLATE 0.2 MG/ML
INJECTION INTRAMUSCULAR; INTRAVENOUS AS NEEDED
Status: DISCONTINUED | OUTPATIENT
Start: 2022-12-08 | End: 2022-12-08

## 2022-12-08 RX ORDER — FENTANYL CITRATE 50 UG/ML
INJECTION, SOLUTION INTRAMUSCULAR; INTRAVENOUS AS NEEDED
Status: DISCONTINUED | OUTPATIENT
Start: 2022-12-08 | End: 2022-12-08

## 2022-12-08 RX ORDER — MIDAZOLAM HYDROCHLORIDE 2 MG/2ML
INJECTION, SOLUTION INTRAMUSCULAR; INTRAVENOUS AS NEEDED
Status: DISCONTINUED | OUTPATIENT
Start: 2022-12-08 | End: 2022-12-08

## 2022-12-08 RX ORDER — PROPOFOL 10 MG/ML
INJECTION, EMULSION INTRAVENOUS AS NEEDED
Status: DISCONTINUED | OUTPATIENT
Start: 2022-12-08 | End: 2022-12-08

## 2022-12-08 RX ORDER — FENTANYL CITRATE/PF 50 MCG/ML
50 SYRINGE (ML) INJECTION
Status: DISCONTINUED | OUTPATIENT
Start: 2022-12-08 | End: 2022-12-08 | Stop reason: HOSPADM

## 2022-12-08 RX ORDER — HYDROMORPHONE HCL IN WATER/PF 6 MG/30 ML
0.2 PATIENT CONTROLLED ANALGESIA SYRINGE INTRAVENOUS
Status: DISCONTINUED | OUTPATIENT
Start: 2022-12-08 | End: 2022-12-08 | Stop reason: HOSPADM

## 2022-12-08 RX ORDER — NEOSTIGMINE METHYLSULFATE 1 MG/ML
INJECTION INTRAVENOUS AS NEEDED
Status: DISCONTINUED | OUTPATIENT
Start: 2022-12-08 | End: 2022-12-08

## 2022-12-08 RX ORDER — ACETAMINOPHEN 325 MG/1
650 TABLET ORAL EVERY 6 HOURS PRN
Status: DISCONTINUED | OUTPATIENT
Start: 2022-12-08 | End: 2022-12-08 | Stop reason: HOSPADM

## 2022-12-08 RX ORDER — OXYCODONE HYDROCHLORIDE 5 MG/1
5 TABLET ORAL EVERY 4 HOURS PRN
Status: DISCONTINUED | OUTPATIENT
Start: 2022-12-08 | End: 2022-12-08 | Stop reason: HOSPADM

## 2022-12-08 RX ORDER — LIDOCAINE HYDROCHLORIDE 10 MG/ML
INJECTION, SOLUTION EPIDURAL; INFILTRATION; INTRACAUDAL; PERINEURAL AS NEEDED
Status: DISCONTINUED | OUTPATIENT
Start: 2022-12-08 | End: 2022-12-08

## 2022-12-08 RX ORDER — MAGNESIUM HYDROXIDE 1200 MG/15ML
LIQUID ORAL AS NEEDED
Status: DISCONTINUED | OUTPATIENT
Start: 2022-12-08 | End: 2022-12-08 | Stop reason: HOSPADM

## 2022-12-08 RX ORDER — DEXAMETHASONE SODIUM PHOSPHATE 10 MG/ML
INJECTION, SOLUTION INTRAMUSCULAR; INTRAVENOUS AS NEEDED
Status: DISCONTINUED | OUTPATIENT
Start: 2022-12-08 | End: 2022-12-08

## 2022-12-08 RX ADMIN — ROCURONIUM BROMIDE 50 MG: 50 INJECTION INTRAVENOUS at 10:58

## 2022-12-08 RX ADMIN — PROPOFOL 130 MG: 10 INJECTION, EMULSION INTRAVENOUS at 10:58

## 2022-12-08 RX ADMIN — DEXAMETHASONE SODIUM PHOSPHATE 5 MG: 10 INJECTION INTRAMUSCULAR; INTRAVENOUS at 11:04

## 2022-12-08 RX ADMIN — GLYCOPYRROLATE 0.8 MCG: 0.2 INJECTION, SOLUTION INTRAMUSCULAR; INTRAVENOUS at 12:30

## 2022-12-08 RX ADMIN — PHENYLEPHRINE HYDROCHLORIDE 30 MCG/MIN: 10 INJECTION INTRAVENOUS at 11:28

## 2022-12-08 RX ADMIN — MIDAZOLAM 2 MG: 1 INJECTION INTRAMUSCULAR; INTRAVENOUS at 10:48

## 2022-12-08 RX ADMIN — LIDOCAINE HYDROCHLORIDE 50 MG: 10 INJECTION, SOLUTION EPIDURAL; INFILTRATION; INTRACAUDAL; PERINEURAL at 10:55

## 2022-12-08 RX ADMIN — FENTANYL CITRATE 50 MCG: 50 INJECTION INTRAMUSCULAR; INTRAVENOUS at 10:56

## 2022-12-08 RX ADMIN — PROPOFOL 20 MG: 10 INJECTION, EMULSION INTRAVENOUS at 10:56

## 2022-12-08 RX ADMIN — SODIUM CHLORIDE, SODIUM LACTATE, POTASSIUM CHLORIDE, AND CALCIUM CHLORIDE 50 ML/HR: .6; .31; .03; .02 INJECTION, SOLUTION INTRAVENOUS at 09:05

## 2022-12-08 RX ADMIN — ROCURONIUM BROMIDE 15 MG: 50 INJECTION INTRAVENOUS at 11:46

## 2022-12-08 RX ADMIN — FENTANYL CITRATE 50 MCG: 50 INJECTION INTRAMUSCULAR; INTRAVENOUS at 11:37

## 2022-12-08 RX ADMIN — ONDANSETRON 4 MG: 2 INJECTION INTRAMUSCULAR; INTRAVENOUS at 11:12

## 2022-12-08 RX ADMIN — NEOSTIGMINE METHYLSULFATE 4 MG: 1 INJECTION INTRAVENOUS at 12:30

## 2022-12-08 RX ADMIN — SODIUM CHLORIDE, SODIUM LACTATE, POTASSIUM CHLORIDE, AND CALCIUM CHLORIDE: .6; .31; .03; .02 INJECTION, SOLUTION INTRAVENOUS at 12:49

## 2022-12-08 NOTE — ANESTHESIA POSTPROCEDURE EVALUATION
Post-Op Assessment Note    CV Status:  Stable  Pain Score: 0    Pain management: adequate     Mental Status:  Awake   Hydration Status:  Stable and euvolemic   PONV Controlled:  None   Airway Patency:  Patent      Post Op Vitals Reviewed: Yes      Staff: CRNA         No notable events documented      BP   159/84   Temp   98 0   Pulse 87   Resp 18   SpO2 94

## 2022-12-08 NOTE — DISCHARGE INSTRUCTIONS
You are to follow up with Dr Jorge Brito in the office on 12/21/22  Please call the office to confirm your appointment for this date  Your neck incision was dressed with steri-strips; these are to stay in place till they either fall off spontaneously or till you are seen in the office  You may shower from tomorrow but you are to avoid swims and bath tub immersion  You may take tylenol or motrin (after meals) for mild to moderate post-operative pain  You should call the office or come to the emergency room if you develop any of the following; worsening pain and swelling from your neck incision, change in the quality of your voice, difficulty breathing or swallowing, purulent discharge from your incision associated with fever  Your remaining thyroid tissue was removed at this surgery; you are therefore to take 125 mcg levothyroxine daily indefinitely  Please ensure to take this medication daily on a schedule

## 2022-12-08 NOTE — INTERVAL H&P NOTE
H&P reviewed  After examining the patient I find no changes in the patients condition since the H&P had been written      Vitals:    12/08/22 0849   BP: 141/88   Pulse: 88   Resp: 18   Temp: 98 3 °F (36 8 °C)   SpO2: 96%

## 2022-12-08 NOTE — DISCHARGE INSTR - AVS FIRST PAGE
You are to follow up with Dr Mariama Maxwell in the office on 12/21/22  Please call the office to confirm your appointment for this date  Your neck incision was dressed with steri-strips; these are to stay in place till they either fall off spontaneously or till you are seen in the office  You may shower from tomorrow but you are to avoid swims and bath tub immersion  You may take tylenol or motrin (after meals) for mild to moderate post-operative pain  You should call the office or come to the emergency room if you develop any of the following; worsening pain and swelling from your neck incision, change in the quality of your voice, difficulty breathing or swallowing, purulent discharge from your incision associated with fever  Your remaining thyroid tissue was removed at this surgery; you are therefore to take 125 mcg levothyroxine daily indefinitely  Please ensure to take this medication daily on a schedule

## 2022-12-08 NOTE — OP NOTE
OPERATIVE REPORT  PATIENT NAME: Abby Cantu    :  1955  MRN: [id number]  Pt Location: [facility] OR ROOM 07    SURGERY DATE: 2022    Surgeon(s) and Role:     * Juan Francisco Finley MD - Primary     * Hermila Hedrick MD - Adrián Enamorado MD - Observing    Preop Diagnosis:  Encounter for follow-up surveillance of thyroid cancer [Z08, Z85 850]  History of thyroid cancer [Z85 850]    Post-Op Diagnosis Codes:     * Encounter for follow-up surveillance of thyroid cancer [Z08, Z85 850]     * History of thyroid cancer [Z85 850]    Procedure(s) (LRB):  COMPLETION (LEFT) THYROIDECTOMY (Left)  FLEXIBLE LARYNGOSCOPY (N/A)    Specimen(s):  ID Type Source Tests Collected by Time Destination   1 : Left complete hemithyroidectomy Tissue Thyroid, Left TISSUE EXAM Juan Francisco Finley MD 2022 1212        Estimated Blood Loss:   Minimal    Drains:  * No LDAs found *    Anesthesia Type:   General    Operative Indications:  Encounter for follow-up surveillance of thyroid cancer [Z08, Z85 850]  History of thyroid cancer [Z85 850]  On the right    Operative Findings:  Left thyroid nodules    Complications:   None    Procedure and Technique:  The patient was brought into the operating room and identified by a proper timeout  Following this, flexible laryngoscopy was performed which confirmed bilateral and symmetrical cord motion with phonation  Following this he was intubated by the anesthesia team  She was then positioned with a shoulder roll behind the scapulae and the head in the sniffing position  The neck was then prepped and draped in the usual fashion  Following this, the skin at and around the planned cervical incision site was anesthetized with lidocaine  Next, a 6 cm incision was made 2 fingerbreadths above the sternal notch, over the prior scar    Cautery was used to dissect through the dermis and subcutaneous fat  The platysma was transected with cautery   We then created flaps superiorly and inferiorly underneath the platysma  Gelpi retractors were then placed for exposure  We then proceeded to look for the strap muscles  We were able to visualize strap muscles and mobilized them off the anterolateral aspect of the left thyroid lobe  Using traction on the superior pole we were able to take down the superior pole vessels with Harmonic Scalpel  We rotated the gland anteromedially  We saw what appeared to be the parathyroid glands which were visualized and preserved by means of close capsular dissection using bipolar device  We visualized recurrent laryngeal nerve as we rolled the small lobe forward  The ligament of berry was then clamped and tied off with a 2-0 vicryl suture  The lobe was then freed from anterior surface of the trachea with cautery  The isthmus was clamped off, divided, and oversewn with a 2-0 vicryl suture in running fashion  The lobe was then sent to pathology for processing  Valsalva was applied in the operative field inspected for bleeding  None was seen  Surgicel was placed in the operative field      Once we were sure of hemostasis, closure was performed using 3-0 Vicryl to close the strap muscles in the midline, followed by running 3-0 Vicryl to close the platysma, followed by a 4-0 vicryl to close the dermis in interrupted fashion, followed by 5-0 Monocryl placed in running subcuticular fashion to close the skin  Benzoin and steristrips were applied to the incision, followed by gauze and a blue towel  The patient tolerated the procedure well without any difficulties     I was present for the entire procedure    Patient Disposition:  PACU         SIGNATURE: Bart Mancini MD  DATE: December 8, 2022  TIME: 12:27 PM

## 2022-12-08 NOTE — ANESTHESIA PREPROCEDURE EVALUATION
Procedure:  COMPLETION (LEFT) THYROIDECTOMY (Left: Neck)  FLEXIBLE LARYNGOSCOPY (Throat)    Relevant Problems   CARDIO   (+) Hypertension      ENDO   (+) Type 2 diabetes mellitus with hyperglycemia, without long-term current use of insulin (HCC)      GI/HEPATIC   (+) Dilated pancreatic duct   (+) Duodenal ulcer   (+) GERD (gastroesophageal reflux disease)   (+) Hepatic steatosis   (+) Pancreatic cyst      MUSCULOSKELETAL   (+) Osteoarthritis of right ankle and foot      NEURO/PSYCH   (+) Encounter for follow-up surveillance of thyroid cancer      Endocrine   (+) 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HCC)   (+) Multiple thyroid nodules      Other   (+) Malignant melanoma of neck (HCC)        Physical Exam    Airway    Mallampati score: I  TM Distance: >3 FB  Neck ROM: full     Dental   lower dentures and upper dentures,     Cardiovascular  Rhythm: regular, Rate: normal, Cardiovascular exam normal    Pulmonary  Pulmonary exam normal Breath sounds clear to auscultation,     Other Findings        Anesthesia Plan  ASA Score- 3     Anesthesia Type- general with ASA Monitors  Additional Monitors:   Airway Plan: ETT  Plan Factors-Exercise tolerance (METS): >4 METS  Chart reviewed  EKG reviewed  Imaging results reviewed  Existing labs reviewed  Patient summary reviewed  Induction- intravenous  Postoperative Plan- Plan for postoperative opioid use  Informed Consent- Anesthetic plan and risks discussed with patient  I personally reviewed this patient with the CRNA  Discussed and agreed on the Anesthesia Plan with the CRNA  Dedrick Gibbons

## 2022-12-09 ENCOUNTER — TELEPHONE (OUTPATIENT)
Dept: SURGICAL ONCOLOGY | Facility: CLINIC | Age: 67
End: 2022-12-09

## 2022-12-09 NOTE — TELEPHONE ENCOUNTER
Patient called in with questions about her medications and levothyroxine  All questions answered  Patient will reach out to her PCP to see about taking omeprazole in the evening instead of the morning  Patient verbalized understanding and thanks  Post op confirmed for Dec 21

## 2022-12-21 ENCOUNTER — OFFICE VISIT (OUTPATIENT)
Dept: SURGICAL ONCOLOGY | Facility: CLINIC | Age: 67
End: 2022-12-21

## 2022-12-21 VITALS
HEIGHT: 64 IN | TEMPERATURE: 97.8 F | OXYGEN SATURATION: 97 % | DIASTOLIC BLOOD PRESSURE: 76 MMHG | WEIGHT: 184 LBS | BODY MASS INDEX: 31.41 KG/M2 | RESPIRATION RATE: 16 BRPM | SYSTOLIC BLOOD PRESSURE: 140 MMHG | HEART RATE: 61 BPM

## 2022-12-21 DIAGNOSIS — C73 HURTHLE CELL CARCINOMA OF THYROID (HCC): Primary | ICD-10-CM

## 2022-12-21 RX ORDER — PHENAZOPYRIDINE HYDROCHLORIDE 200 MG/1
TABLET, FILM COATED ORAL
COMMUNITY
Start: 2022-12-20

## 2022-12-21 NOTE — PROGRESS NOTES
Surgical Oncology Follow Up       [facility] SURGICAL ONCOLOGY [facility] [address]    Leslie Munira  1955  [id number]  [facility] SURGICAL ONCOLOGY [place]  [address] [address]    Chief Complaint   Patient presents with   • Post-op       Assessment/Plan:    No problem-specific Assessment & Plan notes found for this encounter  Diagnoses and all orders for this visit:    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[facility] )  -     US head neck lymph node mapping; Future  -     Thyroid Panel With TSH; Future  -     Thyroglobulin Panel; Future  -     Ambulatory Referral to Endocrinology; Future    Other orders  -     phenazopyridine (PYRIDIUM) 200 mg tablet; TAKE 1 TABLET BY MOUTH THREE TIMES DAILY AFTER A MEAL FOR 2 DAYS        Advance Care Planning/Advance Directives:  Discussed disease status, cancer treatment plans and/or cancer treatment goals with the patient  Oncology History   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[facility] )   10/12/2021 Surgery    Right hemithyroidectomy:  -  Oncocytic (Hürthle cell) carcinoma, encapsulated and angioinvasive       T3a   12/8/2022 Surgery    Left complete hemithyroidectomy:       - Papillary thyroid carcinoma, classic, 0 15cm      - Lymphovascular invasion: Not identified       - Perineural invasion: Not identified       - All margins negative for invasive carcinoma       -T1a         History of Present Illness: 72-year-old woman here for postop check status post completion left hemithyroidectomy   -Interval History: Patient returns after ablation  She is doing well  Review of Systems:  Review of Systems   Constitutional: Negative  HENT: Negative  Eyes: Negative  Respiratory: Negative  Cardiovascular: Negative  Gastrointestinal: Negative  Endocrine: Negative  Genitourinary: Negative  Musculoskeletal: Negative  Skin: Negative  Allergic/Immunologic: Negative  Neurological: Negative  Hematological: Negative  Psychiatric/Behavioral: Negative  All other systems reviewed and are negative  Patient Active Problem List   Diagnosis   • GERD (gastroesophageal reflux disease)   • Hypertension   • Type 2 diabetes mellitus with hyperglycemia, without long-term current use of insulin ([facility] )   • Dilated pancreatic duct   • Duodenal ulcer   • Multiple thyroid nodules   • Right lower lobe pulmonary nodule   • Osteoarthritis of right ankle and foot   • Hammer toes of both feet   • Class 1 obesity due to excess calories with serious comorbidity in adult   • Hepatic steatosis   • Pancreatic cyst   • Chronic rhinitis   • Malignant melanoma of neck (HCC)   • 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[facility] )   • Encounter for follow-up surveillance of thyroid cancer     Past Medical History:   Diagnosis Date   • Acute GI bleeding 10/27/2018   • Diabetes mellitus ([facility] )     prediabetic    • Duodenal ulcer 10/28/2018   • History of transfusion    • Hypertension    • Irritable bowel disease    • Melanoma ([facility] )    • Melena 10/27/2018    Added automatically from request for surgery [id number]   • Vertigo      Past Surgical History:   Procedure Laterality Date   • 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   • ESOPHAGOGASTRODUODENOSCOPY N/A 10/28/2018    Procedure: ESOPHAGOGASTRODUODENOSCOPY (EGD);   Surgeon: Lorin Roger MD;  Location: [facility];  Service: Gastroenterology   • HYSTERECTOMY      9275'[id number]   • LARYNGOSCOPY N/A 12/8/2022    Procedure: Keren Guzman;  Surgeon: Paula Membreno MD;  Location: [facility];  Service: Surgical Oncology   • OOPHORECTOMY Left    • THYROID LOBECTOMY Right 10/12/2021    Procedure: LOBECTOMY THYROID, right  hemithyroidectomy;  Surgeon: Paula Membreno MD;  [facility];  Service: Surgical Oncology   • THYROID LOBECTOMY Left 12/8/2022    Procedure: [id number] (LEFT) THYROIDECTOMY;  Surgeon: Marcy Chandler Teri Gil MD;  Location[facility];  Service: Surgical Oncology   • US GUIDED THYROID BIOPSY  2021     Family History   Problem Relation Age of Onset   • Substance Abuse Father         alcohol   • Alcohol abuse Father    • No Known Problems Mother    • No Known Problems Daughter    • No Known Problems Maternal Grandmother    • Colon cancer Maternal Grandfather         age unknown   • No Known Problems Paternal Grandmother    • No Known Problems Paternal Grandfather    • No Known Problems Maternal Aunt    • No Known Problems Paternal Aunt    • No Known Problems Paternal Aunt    • No Known Problems Paternal Aunt    • No Known Problems Paternal Aunt    • No Known Problems Paternal Aunt    • Colon cancer Cousin         19's   • Mental illness Neg Hx      Social History     Socioeconomic History   • Marital status:      Spouse name: Not on file   • Number of children: Not on file   • Years of education: Not on file   • Highest education level: Not on file   Occupational History   • Not on file   Tobacco Use   • Smoking status: Former     Packs/day: 1 00     Years: 10 00     Pack years: 10 00     Types: Cigarettes     Quit date: 1969     Years since quittin 1   • Smokeless tobacco: Never   Vaping Use   • Vaping Use: Never used   Substance and Sexual Activity   • Alcohol use: Not Currently   • Drug use: Never   • Sexual activity: Not Currently     Partners: Male   Other Topics Concern   • Not on file   Social History Narrative    Lives alone---    Feels safe at home  Has dentures  No living will  Exercises daily--exercise bike and walking to dvd         Social Determinants of Health     Financial Resource Strain: Not on file   Food Insecurity: Not on file   Transportation Needs: Not on file   Physical Activity: Not on file   Stress: Not on file   Social Connections: Not on file   Intimate Partner Violence: Not on file   Housing Stability: Not on file       Current Outpatient Medications: •  acetaminophen (TYLENOL) 325 mg tablet, Take 650 mg by mouth every 6 (six) hours as needed for mild pain, Disp: , Rfl:   •  Ascorbic Acid (VITAMIN C) 100 MG tablet, Take 100 mg by mouth daily, Disp: , Rfl:   •  ascorbic acid (VITAMIN C) 500 MG tablet, every 24 hours, Disp: , Rfl:   •  b complex vitamins capsule, Take 1 capsule by mouth daily, Disp: , Rfl:   •  Bacillus Coagulans-Inulin (Probiotic) 1-250 BILLION-MG CAPS, 1 capsule every 24 hours, Disp: , Rfl:   •  Blood Glucose Monitoring Suppl (ONE TOUCH ULTRA 2) w/Device KIT, Testing 4 times daily, Disp: 1 each, Rfl: 0  •  Cholecalciferol (VITAMIN D3) 2000 units TABS, 1 daily, Disp: , Rfl:   •  Collagen-Vitamin C-Biotin (COLLAGEN 1500/C PO), , Disp: , Rfl:   •  Empagliflozin (JARDIANCE) 10 MG TABS tablet, every 24 hours, Disp: , Rfl:   •  glucosamine-chondroitin 500-400 MG tablet, Take 1 tablet by mouth 3 (three) times a day, Disp: , Rfl:   •  glucose blood (ONE TOUCH ULTRA TEST) test strip, Testing 4 times daily, Disp: 100 each, Rfl: 5  •  Lancets (ONETOUCH DELICA PLUS [id number]) MISC, , Disp: , Rfl:   •  levothyroxine (Synthroid) 125 mcg tablet, Take 1 tablet (125 mcg total) by mouth daily, Disp: 30 tablet, Rfl: 3  •  lisinopril (ZESTRIL) 20 mg tablet, Take 20 mg by mouth daily, Disp: , Rfl:   •  Omega-3 Fatty Acids (FISH OIL) 1,000 mg, Take 1,000 mg by mouth daily, Disp: , Rfl:   •  omeprazole (PriLOSEC) 40 MG capsule, TAKE 1 CAPSULE BY MOUTH ONCE DAILY 30 MINUTES BEFORE MORNING MEAL, Disp: , Rfl:   •  phenazopyridine (PYRIDIUM) 200 mg tablet, TAKE 1 TABLET BY MOUTH THREE TIMES DAILY AFTER A MEAL FOR 2 DAYS, Disp: , Rfl:   •  Protein POWD, Take by mouth, Disp: , Rfl:   •  sitaGLIPtin (JANUVIA) 25 mg tablet, every 24 hours, Disp: , Rfl:   •  vitamin A 7500 UNIT capsule, Take 7,500 Units by mouth daily, Disp: , Rfl:   Allergies   Allergen Reactions   • Prednisone Anxiety   • Metformin Abdominal Pain   • Corticosteroids Anxiety     Vitals:    12/21/22 1541 BP: 140/76   Pulse: 61   Resp: 16   Temp: 97 8 °F (36 6 °C)   SpO2: 97%       Physical Exam  Vitals reviewed  Constitutional:       Appearance: Normal appearance  HENT:      Head: Normocephalic and atraumatic  Right Ear: External ear normal       Left Ear: External ear normal    Eyes:      Extraocular Movements: Extraocular movements intact  Pupils: Pupils are equal, round, and reactive to light  Cardiovascular:      Rate and Rhythm: Normal rate and regular rhythm  Pulses: Normal pulses  Heart sounds: Normal heart sounds  Pulmonary:      Breath sounds: Normal breath sounds  Abdominal:      General: Abdomen is flat  Palpations: Abdomen is soft  Musculoskeletal:         General: Normal range of motion  Cervical back: Normal range of motion and neck supple  Skin:     General: Skin is warm and dry  Neurological:      General: No focal deficit present  Mental Status: She is alert and oriented to person, place, and time  Psychiatric:         Mood and Affect: Mood normal          Behavior: Behavior normal          Thought Content: Thought content normal          Judgment: Judgment normal            Results:  Labs:  Case Report   Surgical Pathology Report                         Case: [id number]                                    Authorizing Provider: Farhat Perez MD        Collected:           12/08/2022 [facility]               Ordering Location:     [address]      Received:            12/08/2022 [address] [place] [address] Operating Room                                                       Pathologist:           Hui Brian MD                                                          Specimen:    Thyroid, Left, Left complete hemithyroidectomy                                             Final Diagnosis   A   Left complete hemithyroidectomy:       - Papillary thyroid carcinoma, classic, 0 15cm, see note      - Lymphovascular invasion: Not identified       - Perineural invasion: Not identified       - All margins negative for invasive carcinoma       - Background thyroid with multiple adenomatoid nodules      - See synoptic report    Electronically signed by Adiel Banks MD on 12/12/2022 at  4:10 PM   Note    Note: Intradepartmental consultation is in agreement (EM)      Dr Dee Dee Aguirre is notified of the diagnosis in [facility] via Real Time Contentt on 12/12/22 at 4:08PM       If clinically indicated, the most appropriate tissue block(s) for ancillary testing are block(s): A8      Additional Information          Imaging  US head neck lymph node mapping    Result Date: 12/2/2022  Narrative: NECK ULTRASOUND INDICATION:     C73: Malignant neoplasm of thyroid gland  COMPARISON:  Ultrasound 11/2/2021 FINDINGS: Ultrasound of the thyroidectomy bed and cervical lymph node chains was performed with a high frequency linear transducer  There is no suspicion of recurrent mass in the right thyroidectomy bed  Left thyroid lobe measures 4 2 x 2 0 x 1 8 cm  The isthmus measures 0 4 cm  Nodule #1  Image 22  LEFT upper pole nodule measuring 2 2 x 1 7 x 1 5 cm  Given differences in measuring technique, no significant change from prior  COMPOSITION:  2 points, solid or almost completely solid  ECHOGENICITY:  2 points, hypoechoic  SHAPE:  0 points, wider-than-tall  MARGIN:  0 points, smooth  ECHOGENIC FOCI:  1 point, macrocalcifications  TI-RADS Classification:  TR 4 (4-6 points), FNA if  >=  1 5 cm  Follow if  >=  1 cm  Lymph nodes maintain normal morphologic contour, echogenicity and short axis dimensions of less than 0 7 cm  No evidence for microcalcification or focal nodularity  Impression: No evidence of recurrent or metastatic disease  Stable left thyroid nodule  Workstation performed: [id number]     I reviewed the above laboratory and imaging data  Discussion/Summary: 71-year-old woman status post completion left thyroidectomy    Initial right thyroidectomy revealed T3 thyroid cancer  Completion left thyroidectomy revealing residual T1 cancer, i e  second primary  She is doing well  We will make referral to endocrinology team to discuss possibility of radioactive iodine therapy given size and stage of original tumor  Otherwise we will plan on follow-up in 6 months for surveillance with blood work and ultrasound at that time  All questions answered and copy of pathology report given to her this visit

## 2023-02-16 ENCOUNTER — TELEPHONE (OUTPATIENT)
Dept: HEMATOLOGY ONCOLOGY | Facility: CLINIC | Age: 68
End: 2023-02-16

## 2023-02-16 NOTE — TELEPHONE ENCOUNTER
PAIGE for patient regarding her appointment on 7/6/23 with Nigel Cannon  Provider will be away on vacation and appointment will need to be rescheduled

## 2023-02-16 NOTE — TELEPHONE ENCOUNTER
Appointment Cancellation Or Reschedule     Person calling in Patient   If someone other than patient calling, are they listed on the communication consent form? No   Provider Marleny Blackwell   Office Visit Date and Time 7/6/23   Office Visit Location [facility]   Did patient want to reschedule their office appointment? If so, when was it scheduled to? 7/20/23   Did you have STAR scheduled for this appointment? No   Do you need STAR set up for your new appointment? If yes, please send to "PATIENT RIDESHARE" pool for STAR rescheduling No   Is this patient calling to reschedule an infusion appointment? No   When is their next infusion appointment? No   Is this patient a Chemo patient? No   Reason for Cancellation or Reschedule Breanna Zaapta will be on vacation    Was No show policy reviewed with patient if patient canceling within 24 hours? No     If the patient is cancelling an appointment and needs their STAR Transport cancelled, please route to [facility]  If the patient is a treatment patient, please route this to the office nurse  If the patient is not on treatment, please route to the Clerical pool based on location  If the patient is a surgical oncology patient, please route to surg/onc clinical pool  Route message as high priority if same day cancellation

## 2023-03-06 ENCOUNTER — HOSPITAL ENCOUNTER (OUTPATIENT)
Dept: RADIOLOGY | Facility: HOSPITAL | Age: 68
Discharge: HOME | End: 2023-03-06

## 2023-03-06 DIAGNOSIS — M54.50 LOW BACK PAIN, UNSPECIFIED BACK PAIN LATERALITY, UNSPECIFIED CHRONICITY, UNSPECIFIED WHETHER SCIATICA PRESENT: ICD-10-CM

## 2023-03-27 ENCOUNTER — LAB (OUTPATIENT)
Dept: LAB | Facility: CLINIC | Age: 68
End: 2023-03-27

## 2023-03-27 ENCOUNTER — CONSULT (OUTPATIENT)
Dept: ENDOCRINOLOGY | Facility: CLINIC | Age: 68
End: 2023-03-27

## 2023-03-27 ENCOUNTER — TELEPHONE (OUTPATIENT)
Dept: SURGICAL ONCOLOGY | Facility: CLINIC | Age: 68
End: 2023-03-27

## 2023-03-27 VITALS
HEIGHT: 64 IN | HEART RATE: 77 BPM | WEIGHT: 190.2 LBS | BODY MASS INDEX: 32.47 KG/M2 | SYSTOLIC BLOOD PRESSURE: 148 MMHG | DIASTOLIC BLOOD PRESSURE: 82 MMHG

## 2023-03-27 DIAGNOSIS — E61.1 IRON DEFICIENCY: ICD-10-CM

## 2023-03-27 DIAGNOSIS — C73 HURTHLE CELL CARCINOMA OF THYROID (HCC): Primary | ICD-10-CM

## 2023-03-27 DIAGNOSIS — E89.0 POSTSURGICAL HYPOTHYROIDISM: ICD-10-CM

## 2023-03-27 DIAGNOSIS — E55.9 VITAMIN D DEFICIENCY: ICD-10-CM

## 2023-03-27 DIAGNOSIS — M85.80 OSTEOPENIA, UNSPECIFIED LOCATION: ICD-10-CM

## 2023-03-27 DIAGNOSIS — E11.65 TYPE 2 DIABETES MELLITUS WITH HYPERGLYCEMIA, WITHOUT LONG-TERM CURRENT USE OF INSULIN (HCC): ICD-10-CM

## 2023-03-27 DIAGNOSIS — E89.0 POSTOPERATIVE HYPOTHYROIDISM: ICD-10-CM

## 2023-03-27 DIAGNOSIS — E66.01 MORBID (SEVERE) OBESITY DUE TO EXCESS CALORIES (HCC): ICD-10-CM

## 2023-03-27 LAB
25(OH)D3 SERPL-MCNC: 31.1 NG/ML (ref 30–100)
EST. AVERAGE GLUCOSE BLD GHB EST-MCNC: 194 MG/DL
FERRITIN SERPL-MCNC: 35 NG/ML (ref 8–388)
HBA1C MFR BLD: 8.4 %
T4 FREE SERPL-MCNC: 1.14 NG/DL (ref 0.76–1.46)
TSH SERPL DL<=0.05 MIU/L-ACNC: 0.5 UIU/ML (ref 0.45–4.5)

## 2023-03-27 NOTE — TELEPHONE ENCOUNTER
Patient LM asking to confirm her next appt  I LM for her with details of next appt day, time and place  Teams number given

## 2023-03-27 NOTE — PROGRESS NOTES
Katt Barnett 80 y o  female MRN: [id number]    Encounter: [id number]      Assessment/Plan     Assessment: This is a 80y o -year-old female with Hurthle cell carcinoma of the thyroid, papillary thyroid cancer, post operative hypothyroidism, type 2 diabetes, osteopenia, vitamin D deficiency and iron deficiency along with obesity  Plan:  1  Hurthle cell carcinoma and papillary thyroid cancer-I have referred her to nuclear medicine for possible radioactive iodine treatment  2   Postoperative hypothyroidism-check TSH and free T4  Her target TSH is between 0 3 and 0 5  3   Type 2 diabetes with hyperglycemia-check hemoglobin A1c     4   Vitamin D deficiency-continue vitamin D supplements  Check 25-hydroxy vitamin D level  5   Iron deficiency-check iron panel  6   Osteopenia-encouraged her to get adequate amount of calcium and vitamin D which includes 1200 mg of calcium per day and at least 1000 units vitamin D per day  7   Morbid obesity-this appears to be stable for now  CC:   Thyroid cancer    History of Present Illness     HPI:  67-year-old woman presents for management of thyroid cancer  She states that in 1970, she was noted to have a thyroid nodule which has grown over time  During that timeframe, she has had difficulty with insurance  Most recently, she underwent an ultrasound of the thyroid about 18 months ago at which time she was noted to have a nodule greater than 4 cm  In October 2021, she had a right hemithyroidectomy that showed a greater than 4 cm Hurthle cell carcinoma with angioinvasion  In December 2022, she underwent a completion thyroidectomy  She is on levothyroxine for postoperative hypothyroidism  She denies any family history of thyroid cancer or personal history of radiation exposure  She denies any difficulty swallowing or breathing  She denies any hoarseness  Review of Systems   Constitutional: Negative for chills and fever     Respiratory: Negative for shortness of breath  Cardiovascular: Negative for chest pain  Gastrointestinal: Negative for constipation, diarrhea, nausea and vomiting  Endocrine: Negative for cold intolerance and heat intolerance  All other systems reviewed and are negative  Historical Information   Past Medical History:   Diagnosis Date   • Acute GI bleeding 10/27/2018   • Diabetes mellitus ([facility] )     prediabetic    • Duodenal ulcer 10/28/2018   • History of transfusion    • Hypertension    • Irritable bowel disease    • Melanoma ([facility] )    • Melena 10/27/2018    Added automatically from request for surgery [id number]   • Vertigo      Past Surgical History:   Procedure Laterality Date   • 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   • ESOPHAGOGASTRODUODENOSCOPY N/A 10/28/2018    Procedure: ESOPHAGOGASTRODUODENOSCOPY (EGD);   Surgeon: Greta Estevez MD;  Location: [facility];  Service: Gastroenterology   • LARYNGOSCOPY N/A 2022    Procedure: [place];  Surgeon: Domo Taveras MD;  Location[facility];  Service: Surgical Oncology   • OOPHORECTOMY Left    • PARTIAL HYSTERECTOMY     • THYROID LOBECTOMY Right 10/12/2021    Procedure: LOBECTOMY THYROID, right  hemithyroidectomy;  Surgeon: Domo Taveras MD;  [facility];  Service: Surgical Oncology   • THYROID LOBECTOMY Left 2022    Procedure: COMPLETION (LEFT) THYROIDECTOMY;  Surgeon: Domo Taveras MD;  [facility];  Service: Surgical Oncology   • US GUIDED THYROID BIOPSY  2021     Social History   Social History     Substance and Sexual Activity   Alcohol Use Not Currently     Social History     Substance and Sexual Activity   Drug Use Never     Social History     Tobacco Use   Smoking Status Former   • Packs/day: 1 00   • Years: 10 00   • Pack years: 10 00   • Types: Cigarettes   • Quit date: 1969   • Years since quittin 4   Smokeless Tobacco Never     Family History:   Family History   Problem Relation Age of Onset   • Diabetes type I Mother    • Hypertension Mother    • Throat cancer Mother    • Substance Abuse Father         alcohol   • Alcohol abuse Father    • Ulcers Father    • Hypertension Father    • Lung cancer Father    • Heart disease Father    • No Known Problems Maternal Aunt    • No Known Problems Paternal Aunt    • No Known Problems Paternal Aunt    • No Known Problems Paternal Aunt    • No Known Problems Paternal Aunt    • No Known Problems Paternal Aunt    • No Known Problems Maternal Grandmother    • Colon cancer Maternal Grandfather         age unknown   • No Known Problems Paternal Grandmother    • No Known Problems Paternal Grandfather    • No Known Problems Daughter    • Colon cancer Cousin         19's   • Mental illness Neg Hx        Meds/Allergies   Current Outpatient Medications   Medication Sig Dispense Refill   • acetaminophen (TYLENOL) 325 mg tablet Take 650 mg by mouth every 6 (six) hours as needed for mild pain     • ascorbic acid (VITAMIN C) 500 MG tablet Take 500 mg by mouth daily     • b complex vitamins capsule Take 1 capsule by mouth daily     • Bacillus Coagulans-Inulin (Probiotic) 1-250 BILLION-MG CAPS Take 1 capsule by mouth daily     • Blood Glucose Monitoring Suppl (ONE TOUCH ULTRA 2) w/Device KIT Testing 4 times daily 1 each 0   • Cholecalciferol (VITAMIN D3) 2000 units TABS Take 2,000 Units by mouth in the morning     • Empagliflozin (JARDIANCE) 10 MG TABS tablet Take 10 mg by mouth daily     • glucosamine-chondroitin 500-400 MG tablet Take 1 tablet by mouth 3 (three) times a day     • glucose blood (ONE TOUCH ULTRA TEST) test strip Testing 4 times daily 100 each 5   • Lancets (ONETOUCH DELICA PLUS [id number]) MISC Test BG 4x daily as directed     • levothyroxine (Synthroid) 125 mcg tablet Take 1 tablet (125 mcg total) by mouth daily 30 tablet 3   • lisinopril (ZESTRIL) 20 mg tablet Take 20 mg by mouth daily     • Omega-3 Fatty Acids (FISH OIL) 1,000 mg Take 1,000 mg by mouth "daily     • omeprazole (PriLOSEC) 40 MG capsule Take 40 mg by mouth daily in the early morning 30 minutes before eating     • Protein POWD Take by mouth daily     • sitaGLIPtin (JANUVIA) 25 mg tablet Take 25 mg by mouth daily     • Vitamin A 2400 MCG (8000 UT) TABS Take 2,400 mcg by mouth daily     • Ascorbic Acid (VITAMIN C) 100 MG tablet Take 100 mg by mouth daily (Patient not taking: Reported on 3/27/2023)     • Collagen-Vitamin C-Biotin (COLLAGEN 1500/C PO)  (Patient not taking: Reported on 3/27/2023)     • phenazopyridine (PYRIDIUM) 200 mg tablet TAKE 1 TABLET BY MOUTH THREE TIMES DAILY AFTER A MEAL FOR 2 DAYS (Patient not taking: Reported on 3/27/2023)       No current facility-administered medications for this visit  Allergies   Allergen Reactions   • Prednisone Anxiety   • Metformin Abdominal Pain and GI Intolerance   • Corticosteroids Anxiety       Objective   Vitals: Blood pressure 148/82, pulse 77, height 5' 4\" (1 626 m), weight 86 3 kg (190 lb 3 2 oz), not currently breastfeeding  Physical Exam  Vitals reviewed  Constitutional:       General: She is not in acute distress  Appearance: She is well-developed  She is not diaphoretic  HENT:      Head: Normocephalic and atraumatic  Mouth/Throat:      Pharynx: No oropharyngeal exudate  Eyes:      General: Lids are normal  No scleral icterus  Right eye: No discharge  Left eye: No discharge  Conjunctiva/sclera: Conjunctivae normal    Neck:      Thyroid: No thyromegaly  Comments: Thyroid is absent  Cardiovascular:      Rate and Rhythm: Normal rate and regular rhythm  Heart sounds: Normal heart sounds  No murmur heard  No friction rub  No gallop  Pulmonary:      Effort: Pulmonary effort is normal  No respiratory distress  Breath sounds: Normal breath sounds  No wheezing  Abdominal:      General: Bowel sounds are normal  There is no distension  Palpations: Abdomen is soft  Tenderness:  There " is no abdominal tenderness  Musculoskeletal:         General: No tenderness or deformity  Normal range of motion  Cervical back: Neck supple  Lymphadenopathy:      Head:      Right side of head: No occipital adenopathy  Left side of head: No occipital adenopathy  Upper Body:      Right upper body: No supraclavicular adenopathy  Left upper body: No supraclavicular adenopathy  Skin:     General: Skin is warm  Findings: No erythema or rash  Neurological:      Mental Status: She is alert and oriented to person, place, and time  Cranial Nerves: No cranial nerve deficit  Psychiatric:         Mood and Affect: Mood normal          Behavior: Behavior normal          The history was obtained from the review of the chart, patient  Lab Results:       Case Report   Surgical Pathology Report                         Case: [id number]                                    Authorizing Provider: Veena Dao MD        Collected:           10/12/2021 1424               Ordering Location:     [address]      Received:            10/12/2021 [address] [place] [address] Operating Room                                                       Pathologist:           Carline John MD                                                         Specimen:    Thyroid, Right, right thyroid lobe                                                         Final Diagnosis   A  Thyroid, right, lobectomy:  -  Oncocytic (Hürthle cell) carcinoma, encapsulated and angioinvasive (see synoptic report)       Electronically signed by Leyda Stockton MD on 10/21/2021 at  8:35 AM   Note    The tumor shows oncocytic morphology with follicular growth pattern and a thick fibrous capsule  Within the capsule are multiple areas of tumor budding (pushing invasion) and intracapsular angioinvasion    The combination of findings is compatible with oncocytic carcinoma      Immunohistochemical stain performed with appropriate controls on selected tissue blocks for CD31 shows blood vessels though the foci of angioinvasion identified on H&E sections are not well preserved       Intradepartmental consultation was obtained with diagnostic agreement  Additional Information    All reported additional testing was performed with appropriately reactive controls   These tests were developed and their performance characteristics determined by [facility] or appropriate performing facility, though some tests may be performed on tissues which have not been validated for performance characteristics (such as staining performed on alcohol exposed cell blocks and decalcified tissues)   Results should be interpreted with caution and in the context of the patients’ clinical condition  These tests may not be cleared or approved by the [place]  Food and Drug Administration, though the FDA has determined that such clearance or approval is not necessary  These tests are used for clinical purposes and they should not be regarded as investigational or for research  This laboratory has been approved by CLIA 88, designated as a high-complexity laboratory and is qualified to perform these tests        Synoptic Checklist   THYROID GLAND  8th Edition - Protocol posted: 2/26/2020  THYROID GLAND: RESECTION - All Specimens       SPECIMEN   Procedure  Right lobectomy    TUMOR   Tumor Focality  Unifocal    Tumor Characteristics     Tumor Site  Right lobe    Histologic Type  Oncocytic (Hürthle cell) carcinoma, encapsulated angioinvasive    Tumor Size  Greatest Dimension (Centimeters): 4 4 cm   Extrathyroidal Extension  Not identified    Angioinvasion (vascular invasion)  Present    Extent  Focal (less than 4 vessels)    Lymphatic Invasion  Not identified    Perineural Invasion  Not identified    Margins  Uninvolved by carcinoma    Distance of Invasive Carcinoma from Closest Margin (Millimeters)  <1 mm   LYMPH NODES   Regional "Lymph Nodes  No lymph nodes submitted or found    PATHOLOGIC STAGE CLASSIFICATION (pTNM, AJCC 8th Edition)           Primary Tumor (pT)  pT3a    Regional Lymph Nodes (pN)  pNX      Gross Description       A  The specimen is received in formalin, labeled with the patient's name and hospital number, and is designated \"right thyroid lobe\"  The specimen consists of a 35 gram thyroid lobe, which measures 4 4 cm superior to inferior by 5 5 cm medial to lateral by 3 8 cm anterior to posterior  The anterior half the specimen is inked blue, the posterior half the specimen is inked black, and the isthmus margin resection is inked yellow  The specimen is sectioned, and the thyroid lobe is almost entirely  substituted by a red-tan nodule which measures 4 4 x 4 0 x 3 7 cm  This nodule is encapsulated, and comes within less than 0 1 cm of anterior and posterior inked external surface, and 0 6 cm from isthmus margin of resection  A scant amount of unremarkable red tan parenchyma is identified  Entirely submitted  Forty two cassettes      1-42:  Thyroid lobe in a superior to inferior progression, with a complete section in cassettes 2-3; 4-6; 7-10; 11-16; 17-22; 23-28; 29-34; 35-38; 39-41, with nodule in each cassette, showing closest approach to isthmus margin of resection in cassette 11     Note: The estimated total formalin fixation time based upon information provided by the submitting clinician and the standard processing schedule is under 72 hours    [facility]     Case Report   Surgical Pathology Report                         Case: [id number]                                    Authorizing Provider: Benita Hernandes MD        Collected:           12/08/2022 1212               Ordering Location:     [address]      Received:            12/08/2022 [address] [place] [address] Operating Room                                                       Pathologist:           Clari Garcia MD "                                                          Specimen:    Thyroid, Left, Left complete hemithyroidectomy                                             Final Diagnosis   A  Left complete hemithyroidectomy:       - Papillary thyroid carcinoma, classic, 0 15cm, see note      - Lymphovascular invasion: Not identified       - Perineural invasion: Not identified       - All margins negative for invasive carcinoma       - Background thyroid with multiple adenomatoid nodules      - See synoptic report    Electronically signed by Nicole Winchester MD on 12/12/2022 at  4:10 PM   Note    Note: Intradepartmental consultation is in agreement (EM)      Dr Rohan Rios is notified of the diagnosis in [facility] via [address] on 12/12/22 at 4:08PM       If clinically indicated, the most appropriate tissue block(s) for ancillary testing are block(s): A8      Additional Information    Interpretation performed at [facility], [address] [place] [address]       All reported additional testing was performed with appropriately reactive controls   These tests were developed and their performance characteristics determined by [facility] or appropriate performing facility, though some tests may be performed on tissues which have not been validated for performance characteristics (such as staining performed on alcohol exposed cell blocks and decalcified tissues)   Results should be interpreted with caution and in the context of the patients’ clinical condition  These tests may not be cleared or approved by the [place]  Food and Drug Administration, though the FDA has determined that such clearance or approval is not necessary  These tests are used for clinical purposes and they should not be regarded as investigational or for research  This laboratory has been approved by CLIA 88, designated as a high-complexity laboratory and is qualified to perform these tests        Synoptic Checklist   THYROID GLAND  8th Edition "- Protocol posted: 6/30/2021  THYROID GLAND: RESECTION - All Specimens  SPECIMEN   Procedure  Left lobectomy with isthmusectomy (hemithyroidectomy)    TUMOR   Tumor Focality  Unifocal    Tumor Characteristics     Tumor Site  Left lobe    Tumor Size  Greatest Dimension (Centimeters): 0 15 cm   Histologic Type  Papillary carcinoma, classic (usual, conventional)    Angioinvasion (vascular invasion)  Not identified    Lymphatic Invasion  Not identified    Perineural Invasion  Not identified    Extrathyroidal Extension  Not identified    Margin Status  All margins negative for carcinoma    Distance from Invasive Carcinoma to Closest Margin  1 5 mm   REGIONAL LYMPH NODES   Regional Lymph Node Status  Not applicable (no regional lymph nodes submitted or found)    PATHOLOGIC STAGE CLASSIFICATION (pTNM, AJCC 8th Edition)   Reporting of pT, pN, and (when applicable) pM categories is based on information available to the pathologist at the time the report is issued  As per the AJCC (Chapter 1, 8th Ed ) it is the managing physician’s responsibility to establish the final pathologic stage based upon all pertinent information, including but potentially not limited to this pathology report  pT Category  pT1a    pN Category  pN not assigned (no nodes submitted or found)    ADDITIONAL FINDINGS   Additional Findings  Adenomatoid nodule(s) or nodular follicular disease      Gross Description    A  The specimen is received in formalin, labeled with the patient's name and hospital number, and is designated \" left complete hemithyroidectomy  \" It consists of a 5 0 g left hemithyroidectomy specimen that measures 3 5 cm from superior to inferior, 2 8 cm from right to left, and 1 6 cm from anterior to posterior  The anterior external aspect is tan-purple, shaggy, and contains staples  The posterior external aspect is tan-purple and more smooth and glistening   Loosely attached to the lower pole on the posterior aspect is a 0 5 x " 0 3 cm tan and well-circumscribed density that is bisected to reveal light tan and glistening cut surfaces  There is a cauterized and shaggy isthmus resection margin that measures 1 3 x 0 5 cm  The specimen is differentially inked (see ink key) and serially sectioned from superior to inferior to reveal a tan-brown, soft, well-circumscribed and focally cystic appearing nodule (nodule #1) occupying the superior and mid lobe that measures 2 0 x 1 5 x 1 1 cm  Nodule #1 abuts the anterior and posterior external surfaces and is 1 5 cm from the isthmus resection margin       Located in the lower pole are 2 tan and well-circumscribed nodules that measure 0 5 x 0 4 cm (nodule #2) and 0 5 x 0 3 cm (nodule #3)  Nodule #2 is 0 2 cm from the closest posterior external surface, 0 3 cm from the closest anterior external surface, and 0 5 cm from the isthmus resection margin  Nodule #3 abuts the posterior external surface, is 0 3 cm from the closest anterior external surface, and 1 5 cm from the isthmus resection margin       The uninvolved parenchyma is tan-brown and glistening  No distinct parathyroid tissue is grossly identified       Digital images are taken  The specimen is submitted sequentially and entirely as follows:     Ink Key:   Anterior: Blue  Posterior: Black   Isthmus Margin: Orange      A1: One slice to include the start of nodule #1  A2: One slice to include nodule #1 with the closest anterior and posterior external surfaces  A3: One slice to include nodule #1 with the closest anterior and posterior external surfaces and to include adjacent uninvolved parenchyma  A4: Two slices to include the remaining nodule #1  A7-L3: Two slices to include nodule #2 with the closest anterior and posterior external surfaces and the closest isthmus resection margin  B4-D7: Two slices to include nodule #3 with the closest anterior and posterior external surfaces and the closest isthmus resection margin  A9: Density from the posterior "external aspect, bisected     Note: The estimated total formalin fixation time based upon information provided by the submitting clinician and the standard processing schedule is under 72 hours  Imaging Studies:   Results for orders placed during the hospital encounter of 06/24/21    US thyroid    Impression  Thyroid nodules as described above  The following meets current ACR criteria for ultrasound guided biopsy: The 4 5 x 3 8 x 4 2 cm right mid lobe nodule  (Image number 4) (CRITERIA: TR 4, FNA if > 1 5 cm  Significant increase in size since a prior sonogram from 7/31/2014  Reference: ACR Thyroid Imaging, Reporting and Data System (TI-RADS): White [employer] of [employer]  J AM Harshad Radiol 5405;15:126-621  (additional recommendations based on American Thyroid Association 2015 guidelines )    The study was marked in EPIC for significant notification  Workstation performed: [id number]      I have personally reviewed pertinent reports  Portions of the record may have been created with voice recognition software  Occasional wrong word or \"sound a like\" substitutions may have occurred due to the inherent limitations of voice recognition software  Read the chart carefully and recognize, using context, where substitutions have occurred    "

## 2023-03-29 DIAGNOSIS — E11.65 TYPE 2 DIABETES MELLITUS WITH HYPERGLYCEMIA, WITHOUT LONG-TERM CURRENT USE OF INSULIN (HCC): Primary | ICD-10-CM

## 2023-03-29 NOTE — RESULT ENCOUNTER NOTE
Hemoglobin A1c has increased  Would recommend increasing Jardiance to 25 mg/day and adding either Ozempic or Mounjaro once a week depending on insurance coverage to improve the blood sugar  Your ferritin level is below 75 suggesting iron deficiency  Would recommend over-the-counter iron with vitamin C at dinnertime    Thyroid levels are normal

## 2023-03-30 DIAGNOSIS — E11.65 TYPE 2 DIABETES MELLITUS WITH HYPERGLYCEMIA, WITHOUT LONG-TERM CURRENT USE OF INSULIN (HCC): Primary | ICD-10-CM

## 2023-03-31 LAB
IRON SATN MFR SERPL: 21 % (ref 15–50)
IRON SERPL-MCNC: 67 UG/DL (ref 50–170)
TIBC SERPL-MCNC: 320 UG/DL (ref 250–450)

## 2023-04-01 DIAGNOSIS — Z85.850 ENCOUNTER FOR FOLLOW-UP SURVEILLANCE OF THYROID CANCER: ICD-10-CM

## 2023-04-01 DIAGNOSIS — Z08 ENCOUNTER FOR FOLLOW-UP SURVEILLANCE OF THYROID CANCER: ICD-10-CM

## 2023-04-01 RX ORDER — LEVOTHYROXINE SODIUM 0.12 MG/1
125 TABLET ORAL DAILY
Qty: 30 TABLET | Refills: 0 | Status: SHIPPED | OUTPATIENT
Start: 2023-04-01

## 2023-04-03 ENCOUNTER — HOSPITAL ENCOUNTER (OUTPATIENT)
Dept: RADIOLOGY | Age: 68
Discharge: HOME | End: 2023-04-03

## 2023-04-03 DIAGNOSIS — C73 HURTHLE CELL CARCINOMA OF THYROID (HCC): ICD-10-CM

## 2023-04-03 RX ORDER — LEVOTHYROXINE SODIUM 0.12 MG/1
TABLET ORAL
Qty: 30 TABLET | Refills: 0 | Status: SHIPPED | OUTPATIENT
Start: 2023-04-03

## 2023-04-05 ENCOUNTER — TELEPHONE (OUTPATIENT)
Dept: ENDOCRINOLOGY | Facility: CLINIC | Age: 68
End: 2023-04-05

## 2023-04-05 NOTE — TELEPHONE ENCOUNTER
Called [facility] and spoke to Ramandeep MARTINEZ request for Nuclear Medicine therapy:    CPT Codes:  [id number] x2  10154 x2  26982    Case # [id number]    Transferred to Danny Lopez  in the clinical department  Approved, PA # [id number], Eff 4/5/2023 - 10/2/2023    She confirmed this covers entire I131 tx

## 2023-04-27 ENCOUNTER — OFFICE VISIT (OUTPATIENT)
Dept: DIABETES SERVICES | Facility: HOSPITAL | Age: 68
End: 2023-04-27
Attending: INTERNAL MEDICINE

## 2023-04-27 VITALS — WEIGHT: 190 LBS | BODY MASS INDEX: 33.66 KG/M2 | HEIGHT: 63 IN

## 2023-04-27 DIAGNOSIS — E11.65 TYPE 2 DIABETES MELLITUS WITH HYPERGLYCEMIA, WITHOUT LONG-TERM CURRENT USE OF INSULIN (HCC): Primary | ICD-10-CM

## 2023-04-27 NOTE — PROGRESS NOTES
"Medical Nutrition Therapy     Assessment    Visit Type: Initial visit  Chief complaint:  Type 2 diabetes     HPI:  Met with Lauren for initial MNT  Patient of our [facility] office  States her blood sugars had once been under good control, but then elevated to the 304 100s, and she then knew she needed to start using some medication to get things under better control  Strong family history of diabetes  Since seeing the endocrinologist, where she received an increase of her diabetes medication, as well as being more focused on lifestyle and testing, she is seeing her blood sugars improving  Fasting 140s, before lunch 130-140, before dinner might be a little higher, bedtime 170ish  Food recall shows primary issues: Overall eats mostly a healthy diet  She does admit to struggling with some binge eating and emotional eating  States one of her friends goes to [employer] and has asked her to join her in that, I think that would be a great support system for her in making changes  While she eats healthy food choices, we do not know how they affect her blood sugar  I asked her to start paired testing, before meal and 2 hours after meal, rotating between meals, to assess how food affects her blood sugars and make changes accordingly  Together we discussed what foods contain CHO, reading a food label, serving sizes, and set a carb goal of 30-45g CHO/meal to promote weight loss with 15g snacks  Put together sample meals for Lauren's reference and evaluated Lauren's current eating plan  Good understanding, Caitlyn Perry will call with questions or for more education  Follow up as needed if not improving  Ht Readings from Last 1 Encounters:   04/27/23 5' 3\" (1 6 m)     Wt Readings from Last 3 Encounters:   04/27/23 86 2 kg (190 lb)   03/27/23 86 3 kg (190 lb 3 2 oz)   12/21/22 83 5 kg (184 lb)        Body mass index is 33 66 kg/m²       Lab Results   Component Value Date    HGBA1C 8 4 (H) 03/27/2023    HGBA1C 7 9 " (H) 11/15/2021    HGBA1C 9 1 (H) 11/12/2021       No results found for: CHOL  Lab Results   Component Value Date    HDL 41 05/27/2021     Lab Results   Component Value Date    LDLCALC 117 (H) 05/27/2021     Lab Results   Component Value Date    TRIG 137 05/27/2021     No results found for: CHOLHDL    Weight Change: No    Medical Diagnosis/ICD 10 Code:  E11 65    Barriers to Learning: no barriers    Do you follow any special diet presently?: No  Who shops: patient  Who cooks: patient    Food Log: Completed via the method of food recall- lives alone, retired    Breakfast: up around 5am, takes thyroid pill and waits, testing- 140ish  Eats 6ish 8am at the latest: oatmeal 1/2-1cup dry with almond milk, some fruit; smoothie- fruits, avocado, yogurt, almond milk and green; eggs 1-2 slices of wheat bread;   Morning Snack: not much, fruit, corn chips, nuts cashews   Lunch: 12-2pm: leftovers from dinner; homemade soup; salads; Afternoon Snack: same, diet soda and no salt pretzels  Berries and yogurt  Banana and PB  Dinner: 5-6pm: similar to lunch  Meat starch veggie hot meal    Evening Snack: 8pm has a little snack, nothing afterwards  Bed 9-10pm  Bad things- gram crackers and almond milk or tea, fruit popcicles 2 of them  Beverages: water lots, tea no sugar; coffee rare with almond milk, diet soda, no fruit juice, almond milk,   Eating out/Take out: not much  Exercise ADL, loves to be active  Uses bike, 1-2 miles walking a day, lifts weights  Cardio in the morning or could be afternoon       Nutrition Diagnosis:  Food and nutrition related knowledge deficit  related to Lack of prior exposure to accurate nutrition related information as evidenced by Verbalizes inaccurate or incomplete information    Intervention: plate method, label reading, behavior modification strategies and individualized meal plan     Treatment Goals: Patient understands education and recommendations    Education Material Given  Temi Taylor was provided the Portion Booklet and Planning Healthy Meals     Monitoring and evaluation:    Term code indicator  FH 1 6 3 Carbohydrate Intake Criteria: 30-45g CHO per meal, 15g CHO snacks    Patient’s Response to Instruction:  Comprehensiongood  Motivationgood  Expected Compliancegood    Thank you for coming to the [place] [id number] for education today  Please feel free to call with any questions or concerns      Nabil Hall, [address] [place] [address]  [address] [address]  [facility] [id number]

## 2023-05-08 ENCOUNTER — HOSPITAL ENCOUNTER (OUTPATIENT)
Dept: NUCLEAR MEDICINE | Facility: HOSPITAL | Age: 68
Discharge: HOME | End: 2023-05-08

## 2023-05-08 DIAGNOSIS — C73 MALIGNANT NEOPLASM OF THYROID GLAND (HCC): ICD-10-CM

## 2023-05-08 RX ADMIN — THYROTROPIN ALFA 0.9 MG: 0.9 INJECTION, POWDER, FOR SOLUTION INTRAMUSCULAR at 08:21

## 2023-05-08 NOTE — NURSING NOTE
Received patient for thyrogen injection in nuclear medicine department  Patient denies any illness currently  Patient denies any chance of pregnancy  Nuclear med tech (CK) reviewed testing schedule with patient who had all questions answered  Thyrogen injection procedure and possible side effects explained to patient with verbal understanding  Thyrogen reconstituted per package insert  LOT # [id number]  EXP July 2025    Thyrogen 0 9mg given right buttocks IM  Site clean and dry  Patient without any complained and verbalizes understanding to return tomorrow for second thyrogen injection  Detail Level: Simple Comment: Biopsy shows features most suggestive of psoriasis. Negative PAS. Render Risk Assessment In Note?: no

## 2023-05-09 ENCOUNTER — HOSPITAL ENCOUNTER (OUTPATIENT)
Dept: NUCLEAR MEDICINE | Facility: HOSPITAL | Age: 68
Discharge: HOME | End: 2023-05-09

## 2023-05-09 RX ADMIN — THYROTROPIN ALFA 0.9 MG: 0.9 INJECTION, POWDER, FOR SOLUTION INTRAMUSCULAR at 08:05

## 2023-05-09 NOTE — NURSING NOTE
"Pt arrived for day 2 Thyrogen injection  Pt reports fatigue and feeling \"queasy\" at times after day 1 Thyrogen injection  Pt denied any difficulty swallowing, itching, or rash  Thyrogen injection procedure teaching reviewed w/ verbal understanding  See MAR for injection details  L buttock site clean and dry  Pt aware of remainder of testing schedule and will return today at 1130 for diagnostic capsule administration     "

## 2023-05-10 ENCOUNTER — HOSPITAL ENCOUNTER (OUTPATIENT)
Dept: RADIOLOGY | Age: 68
Discharge: HOME | End: 2023-05-10

## 2023-05-10 ENCOUNTER — APPOINTMENT (OUTPATIENT)
Dept: LAB | Age: 68
End: 2023-05-10

## 2023-05-10 ENCOUNTER — HOSPITAL ENCOUNTER (OUTPATIENT)
Dept: NUCLEAR MEDICINE | Facility: HOSPITAL | Age: 68
Discharge: HOME | End: 2023-05-10

## 2023-05-10 DIAGNOSIS — C73 MALIGNANT NEOPLASM OF THYROID GLAND (HCC): ICD-10-CM

## 2023-05-10 NOTE — RESULT ENCOUNTER NOTE
Appropriate uptake in the neck is seen  There are additional foci in the neck which may represent thyroid cancer  Proceed with I-131 therapy

## 2023-05-11 LAB
THYROGLOB AB SERPL-ACNC: <1 IU/ML (ref 0–0.9)
THYROGLOB SERPL-MCNC: 12.5 NG/ML (ref 1.5–38.5)

## 2023-05-15 ENCOUNTER — HOSPITAL ENCOUNTER (OUTPATIENT)
Dept: NUCLEAR MEDICINE | Facility: HOSPITAL | Age: 68
Discharge: HOME | End: 2023-05-15

## 2023-05-15 DIAGNOSIS — C73 MALIGNANT NEOPLASM OF THYROID GLAND (HCC): ICD-10-CM

## 2023-05-15 NOTE — RESULT ENCOUNTER NOTE
The activity in the neck is the same as it was in the prior scan  No distant activity was seen which is fantastic

## 2023-05-31 DIAGNOSIS — Z85.850 ENCOUNTER FOR FOLLOW-UP SURVEILLANCE OF THYROID CANCER: ICD-10-CM

## 2023-05-31 DIAGNOSIS — Z08 ENCOUNTER FOR FOLLOW-UP SURVEILLANCE OF THYROID CANCER: ICD-10-CM

## 2023-05-31 RX ORDER — LEVOTHYROXINE SODIUM 0.12 MG/1
TABLET ORAL
Qty: 30 TABLET | Refills: 0 | Status: SHIPPED | OUTPATIENT
Start: 2023-05-31

## 2023-06-09 LAB
DEPRECATED FTI SERPL-MCNC: 2.1 UG/DL (ref 1.2–4.9)
T3RU NFR SERPL: 30 % (ref 24–39)
T4 SERPL-MCNC: 7 UG/DL (ref 4.5–12)
THYROGLOB AB SERPL-ACNC: <1 IU/ML (ref 0–0.9)
TSH SERPL DL<=0.005 MIU/L-ACNC: 0.33 UIU/ML (ref 0.45–4.5)

## 2023-06-14 ENCOUNTER — TELEPHONE (OUTPATIENT)
Dept: SURGICAL ONCOLOGY | Facility: CLINIC | Age: 68
End: 2023-06-14

## 2023-06-14 NOTE — TELEPHONE ENCOUNTER
LM for patient in reply to an earlier message, and let her know that Dr Moody Laurent said that her blood work looks good  Reminded her of upcoming [address] and f/u with Radha on 7/20

## 2023-06-21 ENCOUNTER — HOSPITAL ENCOUNTER (OUTPATIENT)
Dept: ULTRASOUND IMAGING | Facility: HOSPITAL | Age: 68
Discharge: HOME | End: 2023-06-21
Attending: SURGERY
Payer: COMMERCIAL

## 2023-06-21 DIAGNOSIS — Z85.850 ENCOUNTER FOR FOLLOW-UP SURVEILLANCE OF THYROID CANCER: ICD-10-CM

## 2023-06-21 DIAGNOSIS — C73 HURTHLE CELL CARCINOMA OF THYROID (HCC): ICD-10-CM

## 2023-06-21 DIAGNOSIS — Z08 ENCOUNTER FOR FOLLOW-UP SURVEILLANCE OF THYROID CANCER: ICD-10-CM

## 2023-06-21 PROCEDURE — 76536 US EXAM OF HEAD AND NECK: CPT

## 2023-06-21 RX ORDER — LEVOTHYROXINE SODIUM 0.12 MG/1
TABLET ORAL
Qty: 30 TABLET | Refills: 0 | OUTPATIENT
Start: 2023-06-21

## 2023-06-21 NOTE — TELEPHONE ENCOUNTER
Patient made aware that Dr Sasha Roblero should be refilling her levothyroxine  Patient verbalized understanding and will reach out to his office

## 2023-06-22 DIAGNOSIS — E11.65 TYPE 2 DIABETES MELLITUS WITH HYPERGLYCEMIA, WITHOUT LONG-TERM CURRENT USE OF INSULIN (HCC): ICD-10-CM

## 2023-06-22 NOTE — TELEPHONE ENCOUNTER
Patient called and left a message requesting a refill of Jardiance 25 mg tablets to be sent to [facility] in [facility]

## 2023-06-30 DIAGNOSIS — Z08 ENCOUNTER FOR FOLLOW-UP SURVEILLANCE OF THYROID CANCER: ICD-10-CM

## 2023-06-30 DIAGNOSIS — Z85.850 ENCOUNTER FOR FOLLOW-UP SURVEILLANCE OF THYROID CANCER: ICD-10-CM

## 2023-06-30 RX ORDER — LEVOTHYROXINE SODIUM 0.12 MG/1
125 TABLET ORAL DAILY
Qty: 30 TABLET | Refills: 2 | Status: SHIPPED | OUTPATIENT
Start: 2023-06-30

## 2023-07-10 ENCOUNTER — OFFICE VISIT (OUTPATIENT)
Dept: ENDOCRINOLOGY | Facility: CLINIC | Age: 68
End: 2023-07-10
Payer: COMMERCIAL

## 2023-07-10 VITALS
DIASTOLIC BLOOD PRESSURE: 82 MMHG | HEART RATE: 77 BPM | SYSTOLIC BLOOD PRESSURE: 140 MMHG | WEIGHT: 187.6 LBS | HEIGHT: 63 IN | BODY MASS INDEX: 33.24 KG/M2

## 2023-07-10 DIAGNOSIS — I10 PRIMARY HYPERTENSION: Chronic | ICD-10-CM

## 2023-07-10 DIAGNOSIS — E11.65 TYPE 2 DIABETES MELLITUS WITH HYPERGLYCEMIA, WITHOUT LONG-TERM CURRENT USE OF INSULIN (HCC): ICD-10-CM

## 2023-07-10 DIAGNOSIS — C73 HURTHLE CELL CARCINOMA OF THYROID (HCC): ICD-10-CM

## 2023-07-10 DIAGNOSIS — E89.0 POSTOPERATIVE HYPOTHYROIDISM: Primary | ICD-10-CM

## 2023-07-10 PROCEDURE — 99215 OFFICE O/P EST HI 40 MIN: CPT | Performed by: PHYSICIAN ASSISTANT

## 2023-07-10 RX ORDER — MULTIVITAMIN WITH IRON
1 TABLET ORAL DAILY
COMMUNITY

## 2023-07-10 RX ORDER — PNV NO.95/FERROUS FUM/FOLIC AC 28MG-0.8MG
1 TABLET ORAL DAILY
COMMUNITY

## 2023-07-10 NOTE — PROGRESS NOTES
Established Patient Progress Note      Chief Complaint   Patient presents with   • Thyroid Cancer   • Hypothyroidism   • Diabetes Type 2        Impression & Plan:    Problem List Items Addressed This Visit        Endocrine    Type 2 diabetes mellitus with hyperglycemia, without long-term current use of insulin ([address])     Diabetes is poorly controlled. Since last visit Jennet Arts was increased to 25 mg daily. Since GFR is normal, recommend increasing Januvia to full dose of 100 mg daily. She has not tolerated metformin and she is not interested in GLP-1 agonists at this time. She has met with dietitian and is exercising regularly. Advised her to check blood sugars twice per day and send log for review. Discussed risk of complications with diabetes and need for better control and she is aware. 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 Recent ultrasound shows no evidence of recurrent or metastatic disease. We will check a thyroglobulin panel now for continued monitoring         Relevant Orders    Thyroglobulin    Postoperative hypothyroidism - Primary     Last TSH at goal. Continue levothyroxine and will monitor. Lab Results   Component Value Date    SYD5AKONEATR 0.496 03/27/2023    TSH 0.331 (L) 06/08/2023                 Cardiovascular and Mediastinum    Hypertension (Chronic)     Not at goal today. She is taking lisinopril 20 mg daily.   She does report whitecoat hypertension and home blood pressure readings have been good            Orders Placed This Encounter   Procedures   • Hemoglobin A1C     Standing Status:   Future Number of Occurrences:   1     Standing Expiration Date:   1/10/2024   • Comprehensive metabolic panel     This is a patient instruction: Patient fasting for 8 hours or longer recommended. Standing Status:   Future     Number of Occurrences:   1     Standing Expiration Date:   1/10/2024   • Lipid Panel with Direct LDL reflex     This is a patient instruction: This test requires patient fasting for 10-12 hours or longer. Drinking of black coffee or black tea is acceptable. Standing Status:   Future     Number of Occurrences:   1     Standing Expiration Date:   1/10/2024   • Albumin / creatinine urine ratio     Standing Status:   Future     Number of Occurrences:   1     Standing Expiration Date:   1/10/2024   • Thyroglobulin     Thyroglobulin PANEL-- includes both quantitative thyroglobulin and thyroglobulin Antibody     Standing Status:   Future     Number of Occurrences:   1     Standing Expiration Date:   1/10/2024       History of Present Illness:   Tere Acosta is a 80 y.o. female with a history of type 2 diabetes without long term use of insulin since about 5 years ago, maybe longer. Reports complications of none. Denies recent illness or hospitalizations. Denies recent severe hypoglycemic or severe hyperglycemic episodes. Denies any issues with her current regimen. home glucose monitoring: are performed regularly    Met with dietician, exercising a lot. (walks, hikes, stationary bike)  Most readings below 150. Lowest 104, highest 200s after milkshake. Hx metformin intolerance. Current regimen:   Jardiance 25mg daily   Januvia 50mg daily     Last Eye Exam: had retinal exam at PCP office, no retinopathy. Last Foot Exam: today at visit, also follows with     Has hypertension: Taking lisinopril  BP at home is good, reports white coat HTN    Thyroid disorders: She has history of thyroid nodules since 1970.   She had right alice-thyroidectomy that showed a greater than 4 cm Hurthle cell carcinoma. Due to this, she had a completion thyroidectomy in December 2022. She was seen by Dr. César Parker on March 27, 2023 and radioactive iodine treatment was recommended. She underwent Thyrogen stimulated I-131 therapy on May 10, 2023 with 151.2 mCi of I-131. 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[address])   • Encounter for follow-up surveillance of thyroid cancer   • Postoperative hypothyroidism      Past Medical History:   Diagnosis Date   • Acute GI bleeding 10/27/2018   • Diabetes mellitus ([address])     prediabetic    • Duodenal ulcer 10/28/2018   • History of transfusion    • Hypertension    • Irritable bowel disease    • Melanoma ([address])    • Melena 10/27/2018    Added automatically from request for surgery [id number]   • Vertigo       Past Surgical History:   Procedure Laterality Date   • 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   • ESOPHAGOGASTRODUODENOSCOPY N/A 10/28/2018    Procedure: ESOPHAGOGASTRODUODENOSCOPY (EGD);   Surgeon: Marianne Servin MD;  Location: [facility];  Service: Gastroenterology   • LARYNGOSCOPY N/A 12/08/2022    Procedure: Trixie Michaelle;  Surgeon: Caro Powers MD;  Location: [facility];  Service: Surgical Oncology   • OOPHORECTOMY Left    • PARTIAL HYSTERECTOMY  1980   • THYROID LOBECTOMY Right 10/12/2021    Procedure: LOBECTOMY THYROID, right  hemithyroidectomy;  Surgeon: Caro Powers MD;  [facility];  Service: Surgical Oncology   • THYROID LOBECTOMY Left 2022    Procedure: COMPLETION (LEFT) THYROIDECTOMY;  Surgeon: Haris Arevalo MD;  Location[facility];  Service: Surgical Oncology   • US GUIDED THYROID BIOPSY  2021      Family History   Problem Relation Age of Onset   • Diabetes type I Mother    • Hypertension Mother    • Throat cancer Mother    • Substance Abuse Father         alcohol   • Alcohol abuse Father    • Ulcers Father    • Hypertension Father    • Lung cancer Father    • Heart disease Father    • No Known Problems Maternal Aunt    • No Known Problems Paternal Aunt    • No Known Problems Paternal Aunt    • No Known Problems Paternal Aunt    • No Known Problems Paternal Aunt    • No Known Problems Paternal Aunt    • No Known Problems Maternal Grandmother    • Colon cancer Maternal Grandfather         age unknown   • No Known Problems Paternal Grandmother    • No Known Problems Paternal Grandfather    • No Known Problems Daughter    • Colon cancer Cousin         19's   • Mental illness Neg Hx      Social History     Tobacco Use   • Smoking status: Former     Packs/day: 1.00     Years: 10.00     Total pack years: 10.00     Types: Cigarettes     Quit date: 1969     Years since quittin.6   • Smokeless tobacco: Never   Substance Use Topics   • Alcohol use: Not Currently     Allergies   Allergen Reactions   • Prednisone Anxiety   • Metformin Abdominal Pain and GI Intolerance   • Corticosteroids Anxiety         Current Outpatient Medications:   •  acetaminophen (TYLENOL) 325 mg tablet, Take 650 mg by mouth every 6 (six) hours as needed for mild pain, Disp: , Rfl:   •  ascorbic acid (VITAMIN C) 500 MG tablet, Take 500 mg by mouth daily, Disp: , Rfl:   •  b complex vitamins capsule, Take 1 capsule by mouth daily, Disp: , Rfl:   •  Bacillus Coagulans-Inulin (Probiotic) 1-250 BILLION-MG CAPS, Take 1 capsule by mouth daily, Disp: , Rfl:   •  Blood Glucose Monitoring Suppl (ONE TOUCH ULTRA 2) w/Device KIT, Testing 4 times daily, Disp: 1 each, Rfl: 0  •  Cholecalciferol (VITAMIN D3) 2000 units TABS, Take 2,000 Units by mouth in the morning, Disp: , Rfl:   •  Collagen-Vitamin C-Biotin (COLLAGEN 1500/C PO), , Disp: , Rfl:   •  Empagliflozin (Jardiance) 25 MG TABS, Take 1 tablet (25 mg total) by mouth every morning, Disp: 30 tablet, Rfl: 0  •  Ferrous Sulfate (Iron) 325 (65 Fe) MG TABS, Take 1 tablet by mouth daily, Disp: , Rfl:   •  glucosamine-chondroitin 500-400 MG tablet, Take 1 tablet by mouth 3 (three) times a day, Disp: , Rfl:   •  glucose blood (ONE TOUCH ULTRA TEST) test strip, Testing 4 times daily, Disp: 100 each, Rfl: 5  •  Lancets (ONETOUCH DELICA PLUS [id number]) MISC, Test BG 4x daily as directed, Disp: , Rfl:   •  levothyroxine 125 mcg tablet, Take 1 tablet (125 mcg total) by mouth daily, Disp: 30 tablet, Rfl: 2  •  lisinopril (ZESTRIL) 20 mg tablet, Take 40 mg by mouth daily, Disp: , Rfl:   •  Magnesium 250 MG TABS, Take 1 tablet by mouth in the morning, Disp: , Rfl:   •  Omega-3 Fatty Acids (FISH OIL) 1,000 mg, Take 1,000 mg by mouth daily, Disp: , Rfl:   •  Protein POWD, Take by mouth daily, Disp: , Rfl:   •  sitaGLIPtin (JANUVIA) 100 mg tablet, Take 1 tablet (100 mg total) by mouth daily, Disp: 30 tablet, Rfl: 5  •  Vitamin A 2400 MCG (8000 UT) TABS, Take 2,400 mcg by mouth daily, Disp: , Rfl:   •  levothyroxine (Synthroid) 125 mcg tablet, Take 1 tablet (125 mcg total) by mouth daily (Patient not taking: Reported on 7/10/2023), Disp: 30 tablet, Rfl: 0  •  phenazopyridine (PYRIDIUM) 200 mg tablet, TAKE 1 TABLET BY MOUTH THREE TIMES DAILY AFTER A MEAL FOR 2 DAYS (Patient not taking: Reported on 3/27/2023), Disp: , Rfl:     Review of Systems   Constitutional: Negative for activity change, appetite change, chills, diaphoresis, fatigue, fever and unexpected weight change. HENT: Negative for trouble swallowing and voice change. Eyes: Negative for visual disturbance.    Respiratory: Negative for shortness of breath. Cardiovascular: Negative for chest pain and palpitations. Gastrointestinal: Negative for abdominal pain, constipation and diarrhea. Endocrine: Negative for cold intolerance, heat intolerance, polydipsia, polyphagia and polyuria. Genitourinary: Negative for frequency and menstrual problem. Musculoskeletal: Positive for arthralgias. Negative for myalgias. Skin: Negative for rash. Allergic/Immunologic: Negative for food allergies. Neurological: Negative for dizziness and tremors. Hematological: Negative for adenopathy. Psychiatric/Behavioral: Negative for sleep disturbance. All other systems reviewed and are negative. Physical Exam:  Body mass index is 33.23 kg/m². /82   Pulse 77   Ht 5' 3" (1.6 m)   Wt 85.1 kg (187 lb 9.6 oz)   LMP  (LMP Unknown)   BMI 33.23 kg/m²    Wt Readings from Last 3 Encounters:   07/10/23 85.1 kg (187 lb 9.6 oz)   04/27/23 86.2 kg (190 lb)   03/27/23 86.3 kg (190 lb 3.2 oz)       Physical Exam  Vitals reviewed. Constitutional:       General: She is not in acute distress. Appearance: She is well-developed. HENT:      Head: Normocephalic and atraumatic. Eyes:      Conjunctiva/sclera: Conjunctivae normal.      Pupils: Pupils are equal, round, and reactive to light. Neck:      Thyroid: No thyromegaly. Cardiovascular:      Rate and Rhythm: Normal rate and regular rhythm. Pulses: no weak pulses          Dorsalis pedis pulses are 2+ on the right side and 2+ on the left side. Posterior tibial pulses are 2+ on the right side and 2+ on the left side. Heart sounds: Normal heart sounds. Pulmonary:      Effort: Pulmonary effort is normal. No respiratory distress. Breath sounds: Normal breath sounds. No wheezing or rales. Abdominal:      General: Bowel sounds are normal. There is no distension. Palpations: Abdomen is soft. Tenderness: There is no abdominal tenderness.    Musculoskeletal:         General: Normal range of motion. Cervical back: Normal range of motion and neck supple. Feet:      Right foot:      Skin integrity: No ulcer, skin breakdown, erythema, warmth, callus or dry skin. Left foot:      Skin integrity: No ulcer, skin breakdown, erythema, warmth, callus or dry skin. Skin:     General: Skin is warm and dry. Neurological:      Mental Status: She is alert and oriented to person, place, and time. Patient's shoes and socks removed. Right Foot/Ankle   Right Foot Inspection  Skin Exam: skin normal and skin intact. No dry skin, no warmth, no callus, no erythema, no maceration, no abnormal color, no pre-ulcer, no ulcer and no callus. Toe Exam: ROM and strength within normal limits and right toe deformity. No swelling, no tenderness and erythema    Sensory   Monofilament testing: intact    Vascular  Capillary refills: < 3 seconds  The right DP pulse is 2+. The right PT pulse is 2+. Left Foot/Ankle  Left Foot Inspection  Skin Exam: skin normal and skin intact. No dry skin, no warmth, no erythema, no maceration, normal color, no pre-ulcer, no ulcer and no callus. Toe Exam: ROM and strength within normal limits and left toe deformity. No swelling, no tenderness and no erythema. Sensory   Monofilament testing: intact    Vascular  Capillary refills: < 3 seconds  The left DP pulse is 2+. The left PT pulse is 2+.      Assign Risk Category  No deformity present  No loss of protective sensation  No weak pulses  Risk: 0      Labs:   Lab Results   Component Value Date    HGBA1C 8.4 (H) 03/27/2023    HGBA1C 7.9 (H) 11/15/2021    HGBA1C 9.1 (H) 11/12/2021     Lab Results   Component Value Date    CREATININE 0.54 (L) 11/07/2022    CREATININE 0.56 (L) 09/04/2021    CREATININE 0.51 (L) 05/27/2021    BUN 11 11/07/2022    K 4.2 11/07/2022     11/07/2022    CO2 21 11/07/2022     eGFR   Date Value Ref Range Status   11/07/2022 101 >59 mL/min/1.73 Final   05/27/2021 101 ml/min/1.73sq m Final     Lab Results   Component Value Date    HDL 41 05/27/2021    TRIG 137 05/27/2021     Lab Results   Component Value Date    ALT 21 11/07/2022    AST 14 11/07/2022    ALKPHOS 86 05/27/2021     Lab Results   Component Value Date    CIN3IYNWFEZC 0.496 03/27/2023    JEE6FPSFEVND 1.811 10/27/2018     Lab Results   Component Value Date    FREET4 1.14 03/27/2023             Discussed with the patient and all questioned fully answered. She will call me if any problems arise. Follow-up appointment in 3 months. Counseled patient on diagnostic results, prognosis, risk and benefit of treatment options, instruction for management, importance of treatment compliance, Risk  factor reduction and impressions    There are no Patient Instructions on file for this visit.     Heaven Salmon PA-C

## 2023-07-10 NOTE — ASSESSMENT & PLAN NOTE
Since last visit, she has radioactive iodine treatment. Recent ultrasound shows no evidence of recurrent or metastatic disease.   We will check a thyroglobulin panel now for continued monitoring

## 2023-07-10 NOTE — ASSESSMENT & PLAN NOTE
Last TSH at goal. Continue levothyroxine and will monitor.    Lab Results   Component Value Date    FZW7ICFMFSTO 0.496 03/27/2023    TSH 0.331 (L) 06/08/2023

## 2023-07-10 NOTE — ASSESSMENT & PLAN NOTE
Diabetes is poorly controlled. Since last visit Jeff Baker was increased to 25 mg daily. Since GFR is normal, recommend increasing Januvia to full dose of 100 mg daily. She has not tolerated metformin and she is not interested in GLP-1 agonists at this time. She has met with dietitian and is exercising regularly. Advised her to check blood sugars twice per day and send log for review. Discussed risk of complications with diabetes and need for better control and she is aware.   Lab Results   Component Value Date    HGBA1C 8.4 (H) 03/27/2023

## 2023-07-10 NOTE — ASSESSMENT & PLAN NOTE
Not at goal today. She is taking lisinopril 20 mg daily.   She does report whitecoat hypertension and home blood pressure readings have been good

## 2023-07-13 ENCOUNTER — TELEPHONE (OUTPATIENT)
Dept: SURGICAL ONCOLOGY | Facility: CLINIC | Age: 68
End: 2023-07-13

## 2023-07-13 NOTE — TELEPHONE ENCOUNTER
Called and left message for patient to remind her to have labs drawn this week prior to follow up with Mohamud Raya on 7/20/23. Orders are in chart and she can [facility] lab. Also provided rightfax number in case she went to lab outside of network. Provided hope line number.

## 2023-07-13 NOTE — TELEPHONE ENCOUNTER
----- Message from Bobby Shea sent at 7/10/2023  8:26 AM EDT -----    ----- Message -----  From: Bobby Shea  Sent: 7/10/2023  12:00 AM EDT  To: Bobby Shea    Need Thyroid labs, f/u zi/Radha 7/20

## 2023-07-14 ENCOUNTER — NURSE TRIAGE (OUTPATIENT)
Dept: OTHER | Facility: OTHER | Age: 68
End: 2023-07-14

## 2023-07-15 NOTE — TELEPHONE ENCOUNTER
Regarding: Lab question  ----- Message from Marleni Phillips sent at 7/14/2023  6:51 PM EDT -----  " I am calling because the doctor sent me to have some labs done and I want to know if I can take the medicine levothyroxine 125 mcg tablet before the lab work"

## 2023-07-15 NOTE — TELEPHONE ENCOUNTER
Reason for Disposition  • Health Information question, no triage required and triager able to answer question    Answer Assessment - Initial Assessment Questions  1.  REASON FOR CALL or QUESTION: "What is your reason for calling today?" or "How can I best help you?" or "What question do you have that I can help answer?"      Questions about instructions for blood work    Protocols used: INFORMATION ONLY CALL - NO TRIAGE-ADULT-

## 2023-07-18 LAB
ALBUMIN SERPL-MCNC: 4.2 G/DL (ref 3.9–4.9)
ALBUMIN/CREAT UR: 9 MG/G CREAT (ref 0–29)
ALBUMIN/GLOB SERPL: 2.3 {RATIO} (ref 1.2–2.2)
ALP SERPL-CCNC: 76 IU/L (ref 44–121)
ALT SERPL-CCNC: 22 IU/L (ref 0–32)
AST SERPL-CCNC: 15 IU/L (ref 0–40)
BILIRUB SERPL-MCNC: 0.7 MG/DL (ref 0–1.2)
BUN SERPL-MCNC: 17 MG/DL (ref 8–27)
BUN/CREAT SERPL: 27 (ref 12–28)
CALCIUM SERPL-MCNC: 9 MG/DL (ref 8.7–10.3)
CHLORIDE SERPL-SCNC: 104 MMOL/L (ref 96–106)
CHOLEST SERPL-MCNC: 182 MG/DL (ref 100–199)
CO2 SERPL-SCNC: 23 MMOL/L (ref 20–29)
CREAT SERPL-MCNC: 0.63 MG/DL (ref 0.57–1)
CREAT UR-MCNC: 69.7 MG/DL
EGFR: 97 ML/MIN/1.73
EST. AVERAGE GLUCOSE BLD GHB EST-MCNC: 174 MG/DL
GLOBULIN SER-MCNC: 1.8 G/DL (ref 1.5–4.5)
GLUCOSE SERPL-MCNC: 142 MG/DL (ref 70–99)
HBA1C MFR BLD: 7.7 % (ref 4.8–5.6)
HDLC SERPL-MCNC: 42 MG/DL
LDLC SERPL CALC-MCNC: 117 MG/DL (ref 0–99)
LDLC/HDLC SERPL: 2.8 RATIO (ref 0–3.2)
MICROALBUMIN UR-MCNC: 6.4 UG/ML
POTASSIUM SERPL-SCNC: 4.2 MMOL/L (ref 3.5–5.2)
PROT SERPL-MCNC: 6 G/DL (ref 6–8.5)
SL AMB VLDL CHOLESTEROL CALC: 23 MG/DL (ref 5–40)
SODIUM SERPL-SCNC: 142 MMOL/L (ref 134–144)
T4 FREE SERPL-MCNC: 1.74 NG/DL (ref 0.82–1.77)
THYROGLOB AB SERPL-ACNC: <1 IU/ML (ref 0–0.9)
THYROGLOB SERPL-MCNC: 0.1 NG/ML (ref 1.5–38.5)
TRIGL SERPL-MCNC: 126 MG/DL (ref 0–149)
TSH SERPL DL<=0.005 MIU/L-ACNC: 0.27 UIU/ML (ref 0.45–4.5)

## 2023-07-20 ENCOUNTER — TELEPHONE (OUTPATIENT)
Dept: HEMATOLOGY ONCOLOGY | Facility: CLINIC | Age: 68
End: 2023-07-20

## 2023-07-20 ENCOUNTER — OFFICE VISIT (OUTPATIENT)
Dept: SURGICAL ONCOLOGY | Facility: CLINIC | Age: 68
End: 2023-07-20
Payer: COMMERCIAL

## 2023-07-20 VITALS
DIASTOLIC BLOOD PRESSURE: 86 MMHG | HEIGHT: 63 IN | OXYGEN SATURATION: 97 % | HEART RATE: 74 BPM | SYSTOLIC BLOOD PRESSURE: 140 MMHG | TEMPERATURE: 98.2 F | BODY MASS INDEX: 33.13 KG/M2 | WEIGHT: 187 LBS | RESPIRATION RATE: 16 BRPM

## 2023-07-20 DIAGNOSIS — Z85.850 HISTORY OF THYROID CANCER: Primary | ICD-10-CM

## 2023-07-20 DIAGNOSIS — C73 HURTHLE CELL CARCINOMA OF THYROID (HCC): ICD-10-CM

## 2023-07-20 PROCEDURE — 99213 OFFICE O/P EST LOW 20 MIN: CPT

## 2023-07-20 RX ORDER — LISINOPRIL 40 MG/1
40 TABLET ORAL DAILY
COMMUNITY
Start: 2023-07-18

## 2023-07-20 NOTE — PROGRESS NOTES
Surgical Oncology Follow Up       [facility] SURGICAL ONCOLOG[facility] [place] [address]    Adiel Nicholas  1955  [id number]  [facility]COL[facility] [place]  [address] [place] [id number]    Chief Complaint   Patient presents with   • Other     Office visit       Assessment/Plan:  1. History of thyroid cancer  - 6 mo follow up  - US head neck lymph node mapping; Future  - Thyroglobulin Panel; Future    2. Hurthle cell carcinoma of thyroid [facility])       Discussion/Summary: Patient is a 59-year-old female presenting today for a 6-month follow-up for thyroid cancer diagnosed in 2021 and 2022. She underwent a right hemithyroidectomy in 2021 as well as a left complete hemithyroidectomy in December 2022. We have been following her with thyroglobulin lab panels as well as ultrasound of the head neck and lymph mapping. Her ultrasound was benign. Her thyroglobulin levels are within normal limits. She is currently being managed by endocrinology. There were no concerns on her exam. I will see the patient back in 6 months or sooner should the need arise. She was instructed to call with any questions or concerns prior to this time. All questions were answered today. History of Present Illness:     Oncology History   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[address])   10/12/2021 Surgery    Right hemithyroidectomy:  -  Oncocytic (Hürthle cell) carcinoma, encapsulated and angioinvasive      12/8/2022 Surgery    Left complete hemithyroidectomy:       - Papillary thyroid carcinoma, classic, 0.15cm      - Lymphovascular invasion: Not identified       - Perineural invasion: Not identified       - All margins negative for invasive carcinoma       -T1a          -Interval History: Patient is a 59-year-old female presenting today for a 6-month follow-up for thyroid cancer diagnosed in 2021 and 2022. Her ultrasound was benign.   Her thyroglobulin levels are within normal limits. She denies changes on her exam.    Review of Systems:  Review of Systems   Constitutional: Negative for activity change, appetite change, fatigue and unexpected weight change. Respiratory: Negative for cough and shortness of breath. Cardiovascular: Negative for chest pain. Gastrointestinal: Negative for abdominal pain, diarrhea, nausea and vomiting. Endocrine: Negative for heat intolerance. Musculoskeletal: Negative for arthralgias, back pain and myalgias. Skin: Negative for rash. Neurological: Negative for weakness and headaches. Hematological: Negative for adenopathy. 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[address])   • Encounter for follow-up surveillance of thyroid cancer   • Postoperative hypothyroidism     Past Medical History:   Diagnosis Date   • Acute GI bleeding 10/27/2018   • Diabetes mellitus ([address])     prediabetic    • Duodenal ulcer 10/28/2018   • History of transfusion    • Hypertension    • Irritable bowel disease    • Melanoma ([address])    • Melena 10/27/2018    Added automatically from request for surgery [id number]   • Vertigo      Past Surgical History:   Procedure Laterality Date   • 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   • ESOPHAGOGASTRODUODENOSCOPY N/A 10/28/2018    Procedure: ESOPHAGOGASTRODUODENOSCOPY (EGD);   Surgeon: Valentin Fowler MD;  Location:  MAIN OR;  Service: Gastroenterology   • LARYNGOSCOPY N/A 2022    Procedure: [place];  Surgeon: Winter Bunch MD;  [facility];  Service: Surgical Oncology   • OOPHORECTOMY Left    • PARTIAL HYSTERECTOMY     • THYROID LOBECTOMY Right 10/12/2021    Procedure: LOBECTOMY THYROID, right  hemithyroidectomy;  Surgeon: Winter Bunch MD;  Location: [facility];  Service: Surgical Oncology   • THYROID LOBECTOMY Left 2022    Procedure: COMPLETION (LEFT) THYROIDECTOMY;  Surgeon: Winter Bunch MD;  [facility];  Service: Surgical Oncology   • US GUIDED THYROID BIOPSY  2021     Family History   Problem Relation Age of Onset   • Diabetes type I Mother    • Hypertension Mother    • Throat cancer Mother    • Substance Abuse Father         alcohol   • Alcohol abuse Father    • Ulcers Father    • Hypertension Father    • Lung cancer Father    • Heart disease Father    • No Known Problems Maternal Aunt    • No Known Problems Paternal Aunt    • No Known Problems Paternal Aunt    • No Known Problems Paternal Aunt    • No Known Problems Paternal Aunt    • No Known Problems Paternal Aunt    • No Known Problems Maternal Grandmother    • Colon cancer Maternal Grandfather         age unknown   • No Known Problems Paternal Grandmother    • No Known Problems Paternal Grandfather    • No Known Problems Daughter    • Colon cancer Cousin         19's   • Mental illness Neg Hx      Social History     Socioeconomic History   • Marital status:       Spouse name: Not on file   • Number of children: Not on file   • Years of education: Not on file   • Highest education level: Not on file   Occupational History   • Not on file   Tobacco Use   • Smoking status: Former     Packs/day: 1.00     Years: 10.00     Total pack years: 10.00     Types: Cigarettes     Quit date: 1969     Years since quittin.7   • Smokeless tobacco: Never   Vaping Use   • Vaping Use: Never used   Substance and Sexual Activity   • Alcohol use: Not Currently   • Drug use: Never   • Sexual activity: Not Currently     Partners: Male   Other Topics Concern   • Not on file   Social History Narrative    Lives alone---    Feels safe at home. Has dentures. No living will. Exercises daily--exercise bike and walking to dvd. Social Determinants of Health     Financial Resource Strain: Not on file   Food Insecurity: Not on file   Transportation Needs: No Transportation Needs (5/18/2021)    PRAPARE - Transportation    • Lack of Transportation (Medical): No    • Lack of Transportation (Non-Medical):  No   Physical Activity: Not on file   Stress: Not on file   Social Connections: Not on file   Intimate Partner Violence: Not on file   Housing Stability: Not on file       Current Outpatient Medications:   •  acetaminophen (TYLENOL) 325 mg tablet, Take 650 mg by mouth every 6 (six) hours as needed for mild pain, Disp: , Rfl:   •  ascorbic acid (VITAMIN C) 500 MG tablet, Take 500 mg by mouth daily, Disp: , Rfl:   •  b complex vitamins capsule, Take 1 capsule by mouth daily, Disp: , Rfl:   •  Bacillus Coagulans-Inulin (Probiotic) 1-250 BILLION-MG CAPS, Take 1 capsule by mouth daily, Disp: , Rfl:   •  Blood Glucose Monitoring Suppl (ONE TOUCH ULTRA 2) w/Device KIT, Testing 4 times daily, Disp: 1 each, Rfl: 0  •  Cholecalciferol (VITAMIN D3) 2000 units TABS, Take 2,000 Units by mouth in the morning, Disp: , Rfl:   •  Collagen-Vitamin C-Biotin (COLLAGEN 1500/C PO), , Disp: , Rfl:   •  Empagliflozin (Jardiance) 25 MG TABS, Take 1 tablet (25 mg total) by mouth every morning, Disp: 30 tablet, Rfl: 0  •  Ferrous Sulfate (Iron) 325 (65 Fe) MG TABS, Take 1 tablet by mouth daily, Disp: , Rfl:   •  glucose blood (ONE TOUCH ULTRA TEST) test strip, Testing 4 times daily, Disp: 100 each, Rfl: 5  •  Lancets (ONETOUCH DELICA PLUS [id number]) MISC, Test BG 4x daily as directed, Disp: , Rfl:   •  levothyroxine 125 mcg tablet, Take 1 tablet (125 mcg total) by mouth daily, Disp: 30 tablet, Rfl: 2  •  lisinopril (ZESTRIL) 40 mg tablet, Take 40 mg by mouth daily, Disp: , Rfl:   •  Magnesium 250 MG TABS, Take 1 tablet by mouth in the morning, Disp: , Rfl:   •  Omega-3 Fatty Acids (FISH OIL) 1,000 mg, Take 1,000 mg by mouth daily, Disp: , Rfl:   •  Protein POWD, Take by mouth daily, Disp: , Rfl:   •  sitaGLIPtin (JANUVIA) 100 mg tablet, Take 1 tablet (100 mg total) by mouth daily, Disp: 30 tablet, Rfl: 5  •  Vitamin A 2400 MCG (8000 UT) TABS, Take 2,400 mcg by mouth daily, Disp: , Rfl:   •  glucosamine-chondroitin 500-400 MG tablet, Take 1 tablet by mouth 3 (three) times a day (Patient not taking: Reported on 7/20/2023), Disp: , Rfl:   •  levothyroxine (Synthroid) 125 mcg tablet, Take 1 tablet (125 mcg total) by mouth daily (Patient not taking: Reported on 7/10/2023), Disp: 30 tablet, Rfl: 0  •  lisinopril (ZESTRIL) 20 mg tablet, Take 40 mg by mouth daily (Patient not taking: Reported on 7/20/2023), Disp: , Rfl:   •  phenazopyridine (PYRIDIUM) 200 mg tablet, TAKE 1 TABLET BY MOUTH THREE TIMES DAILY AFTER A MEAL FOR 2 DAYS (Patient not taking: Reported on 3/27/2023), Disp: , Rfl:   Allergies   Allergen Reactions   • Prednisone Anxiety   • Metformin Abdominal Pain and GI Intolerance   • Corticosteroids Anxiety     Vitals:    07/20/23 1136   BP: 140/86   Pulse: 74   Resp: 16   Temp: 98.2 °F (36.8 °C)   SpO2: 97%       Physical Exam  Constitutional:       General: She is not in acute distress. Appearance: Normal appearance. Neck:      Thyroid: No thyroid mass, thyromegaly or thyroid tenderness. Cardiovascular:      Rate and Rhythm: Normal rate and regular rhythm. Pulses: Normal pulses. Heart sounds: Normal heart sounds. Pulmonary:      Effort: Pulmonary effort is normal.      Breath sounds: Normal breath sounds. Chest:      Chest wall: No mass. Breasts:     Right: No swelling, bleeding, inverted nipple, mass, nipple discharge, skin change or tenderness.       Left: No swelling, bleeding, inverted nipple, mass, nipple discharge, skin change or tenderness. Abdominal:      General: Abdomen is flat. Palpations: Abdomen is soft. Musculoskeletal:      Cervical back: No edema or erythema. Lymphadenopathy:      Upper Body:      Right upper body: No supraclavicular, axillary or pectoral adenopathy. Left upper body: No supraclavicular, axillary or pectoral adenopathy. Skin:     General: Skin is warm. Neurological:      General: No focal deficit present. Mental Status: She is alert and oriented to person, place, and time. Psychiatric:         Mood and Affect: Mood normal.         Behavior: Behavior normal.           Results:    Imaging  US head neck lymph node mapping    Result Date: 6/28/2023  Narrative: NECK ULTRASOUND INDICATION:     C73: Malignant neoplasm of thyroid gland. COMPARISON: Neck ultrasound 11/29/2022. FINDINGS: Ultrasound of the thyroidectomy bed and cervical lymph node chains was performed with a high frequency linear transducer. There is no suspicion of recurrent mass in the thyroidectomy bed. Lymph nodes maintain normal morphologic contour, echogenicity and short axis dimensions of less than 0.7 cm. No evidence for microcalcification or focal nodularity. Impression: No evidence of recurrent or metastatic disease. Workstation performed: [id number]       I reviewed the above imaging data. Advance Care Planning/Advance Directives:  Discussed disease status, cancer treatment plans and/or cancer treatment goals with the patient.

## 2023-07-20 NOTE — TELEPHONE ENCOUNTER
Appointment Confirmation   Who are you speaking with? Patient   If it is not the patient, are they listed on an active communication consent form? N/A   Which provider is the appointment scheduled with? JUDY Mcdaniel   When is the appointment scheduled? Please list date and time 7/20/23 11:30AM    At which location is the appointment scheduled to take place? [place]   Did caller verbalize understanding of appointment details? Yes     Confirmed address with patient.

## 2023-07-24 ENCOUNTER — TELEPHONE (OUTPATIENT)
Dept: ENDOCRINOLOGY | Facility: CLINIC | Age: 68
End: 2023-07-24

## 2023-07-24 NOTE — TELEPHONE ENCOUNTER
Yes, with her diagnosis of Diabetes and elevated cholesterol I think it is important that she take the atorvastatin for cardiovascular risk reduction.

## 2023-07-24 NOTE — TELEPHONE ENCOUNTER
Patient wanted to know if she should start Atorvastatin 20 mg that PCP prescribe based on recent lab results from 07/15/23.

## 2023-08-07 DIAGNOSIS — E11.65 TYPE 2 DIABETES MELLITUS WITH HYPERGLYCEMIA, WITHOUT LONG-TERM CURRENT USE OF INSULIN (HCC): ICD-10-CM

## 2023-08-08 RX ORDER — EMPAGLIFLOZIN 25 MG/1
25 TABLET, FILM COATED ORAL EVERY MORNING
Qty: 30 TABLET | Refills: 0 | Status: SHIPPED | OUTPATIENT
Start: 2023-08-08

## 2023-09-12 DIAGNOSIS — E11.65 TYPE 2 DIABETES MELLITUS WITH HYPERGLYCEMIA, WITHOUT LONG-TERM CURRENT USE OF INSULIN (HCC): ICD-10-CM

## 2023-09-17 DIAGNOSIS — E04.2 MULTIPLE THYROID NODULES: Primary | ICD-10-CM

## 2023-09-18 RX ORDER — LEVOTHYROXINE SODIUM 0.12 MG/1
125 TABLET ORAL DAILY
Qty: 30 TABLET | Refills: 0 | Status: SHIPPED | OUTPATIENT
Start: 2023-09-18

## 2023-10-12 ENCOUNTER — HOSPITAL ENCOUNTER (OUTPATIENT)
Dept: MAMMOGRAPHY | Facility: IMAGING CENTER | Age: 68
Discharge: HOME | End: 2023-10-12
Payer: COMMERCIAL

## 2023-10-12 ENCOUNTER — HOSPITAL ENCOUNTER (OUTPATIENT)
Dept: BONE DENSITY | Facility: IMAGING CENTER | Age: 68
Discharge: HOME | End: 2023-10-12
Payer: COMMERCIAL

## 2023-10-12 VITALS — BODY MASS INDEX: 32.43 KG/M2 | WEIGHT: 183 LBS | HEIGHT: 63 IN

## 2023-10-12 VITALS — HEIGHT: 61 IN | BODY MASS INDEX: 34.29 KG/M2 | WEIGHT: 181.6 LBS

## 2023-10-12 DIAGNOSIS — C73 MALIGNANT NEOPLASM OF THYROID GLAND (HCC): ICD-10-CM

## 2023-10-12 DIAGNOSIS — Z12.31 ENCOUNTER FOR SCREENING MAMMOGRAM FOR MALIGNANT NEOPLASM OF BREAST: ICD-10-CM

## 2023-10-12 PROCEDURE — 77067 SCR MAMMO BI INCL CAD: CPT

## 2023-10-12 PROCEDURE — 77080 DXA BONE DENSITY AXIAL: CPT

## 2023-10-12 PROCEDURE — 77063 BREAST TOMOSYNTHESIS BI: CPT

## 2023-10-25 ENCOUNTER — OFFICE VISIT (OUTPATIENT)
Dept: CARDIOLOGY CLINIC | Facility: CLINIC | Age: 68
End: 2023-10-25
Payer: COMMERCIAL

## 2023-10-25 VITALS
SYSTOLIC BLOOD PRESSURE: 132 MMHG | BODY MASS INDEX: 34.36 KG/M2 | WEIGHT: 182 LBS | DIASTOLIC BLOOD PRESSURE: 78 MMHG | HEART RATE: 74 BPM | HEIGHT: 61 IN

## 2023-10-25 DIAGNOSIS — I10 HYPERTENSION, UNSPECIFIED TYPE: Primary | Chronic | ICD-10-CM

## 2023-10-25 DIAGNOSIS — I49.3 PVC (PREMATURE VENTRICULAR CONTRACTION): ICD-10-CM

## 2023-10-25 DIAGNOSIS — E78.2 MIXED HYPERLIPIDEMIA: ICD-10-CM

## 2023-10-25 DIAGNOSIS — R94.31 ABNORMAL EKG: ICD-10-CM

## 2023-10-25 PROCEDURE — 99204 OFFICE O/P NEW MOD 45 MIN: CPT | Performed by: INTERNAL MEDICINE

## 2023-10-25 PROCEDURE — 93000 ELECTROCARDIOGRAM COMPLETE: CPT | Performed by: INTERNAL MEDICINE

## 2023-10-25 RX ORDER — CLOBETASOL PROPIONATE 0.5 MG/G
OINTMENT TOPICAL
COMMUNITY
Start: 2023-10-23

## 2023-10-25 RX ORDER — CLINDAMYCIN PHOSPHATE 10 MG/G
GEL TOPICAL
COMMUNITY
Start: 2023-10-23

## 2023-10-25 RX ORDER — ATORVASTATIN CALCIUM 20 MG/1
20 TABLET, FILM COATED ORAL
COMMUNITY
Start: 2023-08-24

## 2023-10-25 NOTE — PROGRESS NOTES
Consultation - Cardiology   Jose Elias Seaman 80 y.o. female MRN: [id number]    Encounter: [id number]    5. Hypertension, unspecified type  POCT ECG      2. Abnormal EKG        3. PVC (premature ventricular contraction)  Echo complete w/ contrast if indicated    Holter monitor      4. Mixed hyperlipidemia            Assessment/Plan     Assessment:     Abnormal EKG  Hyperlipidemia  HTN  PVC    Plan:      EKG today shows NSR with PVCs. Her prior EKG also is NSR with PVCs. She has no palpitations. Check holter monitor and echocardiogram.     Hyperlipidemia: Continue with atorvastatin she has followup lab work later in the year. Hypertension: Continue with lisinopril. Her BP is controlled. History of Present Illness   Physician Requesting Consult: No att. providers found  Reason for Consult / Principal Problem: Abnormal EKG  HPI: Jose Elias Seaman is a 80y.o. year old female who presents with a history of preop EKG that was abnormal and here for evaluation. She has no history of chf, cad or arrhythmia. She has hypertension and hyperlipidemia. She has Type 2 DM for about five years. She has no chest pain, dyspnea or palpitations. She remains active. Review of Systems   Constitutional: Negative. HENT: Negative. Eyes: Negative. Cardiovascular: Negative. Respiratory: Negative. Endocrine: Negative. Hematologic/Lymphatic: Negative. Skin: Negative. Musculoskeletal: Negative. Gastrointestinal: Negative. Genitourinary: Negative. Neurological: Negative. Psychiatric/Behavioral: Negative. Allergic/Immunologic: Negative.         Historical Information   Past Medical History:   Diagnosis Date    Acute GI bleeding 10/27/2018    Cervical ca ([address] [facility]) 1983    Diabetes mellitus ([address] [facility])     prediabetic     Duodenal ulcer 10/28/2018    History of transfusion     Hypertension     Irritable bowel disease     Melanoma ([address] [facility]) 1982    Melena 10/27/2018    Added automatically from request for surgery [id number]    Thyroid cancer ([address] [facility])     Vertigo      Past Surgical History:   Procedure Laterality Date    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    ESOPHAGOGASTRODUODENOSCOPY N/A 10/28/2018    Procedure: ESOPHAGOGASTRODUODENOSCOPY (EGD);   Surgeon: Kay Jones MD;  [facility];  Service: Gastroenterology    LARYNGOSCOPY N/A 2022    Procedure: Rekha Prakash;  Surgeon: David Charlton MD;  [facility];  Service: Surgical Oncology    OOPHORECTOMY Left [address]    THYROID LOBECTOMY Right 10/12/2021    Procedure: LOBECTOMY THYROID, right  hemithyroidectomy;  Surgeon: David Charlton MD;  [facility];  Service: Surgical Oncology    THYROID LOBECTOMY Left 2022    Procedure: COMPLETION (LEFT) THYROIDECTOMY;  Surgeon: David Charlton MD;  [facility];  Service: Surgical Oncology    US GUIDED THYROID BIOPSY  2021       Social History:  Social History     Substance and Sexual Activity   Alcohol Use Not Currently     Social History     Substance and Sexual Activity   Drug Use Never     Social History     Tobacco Use   Smoking Status Former    Packs/day: 1.00    Years: 10.00    Total pack years: 10.00    Types: Cigarettes    Quit date: 1969    Years since quittin.9   Smokeless Tobacco Never       Family History:   Family History   Problem Relation Age of Onset    Diabetes type I Mother     Hypertension Mother     Throat cancer Mother 61    Substance Abuse Father         alcohol    Alcohol abuse Father     Ulcers Father     Hypertension Father     Lung cancer Father 79    Heart disease Father     No Known Problems Daughter     No Known Problems Maternal Grandmother     Colon cancer Maternal Grandfather         age unknown    No Known Problems Paternal Grandmother     No Known Problems Paternal Grandfather     No Known Problems Maternal Aunt     No Known Problems Paternal Aunt     No Known Problems Paternal Aunt     No Known Problems Paternal Aunt     No Known Problems Paternal Aunt     No Known Problems Paternal Aunt     Colon cancer Cousin         19's    Mental illness Neg Hx        Meds/Allergies   Allergies   Allergen Reactions    Prednisone Anxiety    Metformin Abdominal Pain and GI Intolerance    Corticosteroids Anxiety       Current Outpatient Medications:     acetaminophen (TYLENOL) 325 mg tablet, Take 650 mg by mouth every 6 (six) hours as needed for mild pain, Disp: , Rfl:     ascorbic acid (VITAMIN C) 500 MG tablet, Take 500 mg by mouth daily, Disp: , Rfl:     atorvastatin (LIPITOR) 20 mg tablet, Take 20 mg by mouth daily at bedtime, Disp: , Rfl:     b complex vitamins capsule, Take 1 capsule by mouth daily, Disp: , Rfl:     Bacillus Coagulans-Inulin (Probiotic) 1-250 BILLION-MG CAPS, Take 1 capsule by mouth daily, Disp: , Rfl:     Blood Glucose Monitoring Suppl (ONE TOUCH ULTRA 2) w/Device KIT, Testing 4 times daily, Disp: 1 each, Rfl: 0    Cholecalciferol (VITAMIN D3) 2000 units TABS, Take 2,000 Units by mouth in the morning, Disp: , Rfl:     clindamycin (CLINDAGEL) 1 % gel, , Disp: , Rfl:     clobetasol (TEMOVATE) 0.05 % ointment, , Disp: , Rfl:     Collagen-Vitamin C-Biotin (COLLAGEN 1500/C PO), , Disp: , Rfl:     Empagliflozin (Jardiance) 25 MG TABS, Take 1 tablet (25 mg total) by mouth every morning, Disp: 30 tablet, Rfl: 3    Ferrous Sulfate (Iron) 325 (65 Fe) MG TABS, Take 1 tablet by mouth daily, Disp: , Rfl:     glucose blood (ONE TOUCH ULTRA TEST) test strip, Testing 4 times daily, Disp: 100 each, Rfl: 5    Lancets (ONETOUCH DELICA PLUS [id number]) MISC, Test BG 4x daily as directed, Disp: , Rfl:     levothyroxine 125 mcg tablet, take 1 tablet by mouth once daily, Disp: 30 tablet, Rfl: 0    lisinopril (ZESTRIL) 40 mg tablet, Take 40 mg by mouth daily, Disp: , Rfl:     Magnesium 250 MG TABS, Take 1 tablet by mouth in the morning, Disp: , Rfl:     Omega-3 Fatty Acids (FISH OIL) 1,000 mg, Take 1,000 mg by mouth daily, Disp: , Rfl:     Protein POWD, Take by mouth daily, Disp: , Rfl:     sitaGLIPtin (JANUVIA) 100 mg tablet, Take 1 tablet (100 mg total) by mouth daily, Disp: 30 tablet, Rfl: 5    Vitamin A 2400 MCG (8000 UT) TABS, Take 2,400 mcg by mouth daily, Disp: , Rfl:     VITAMIN E PO, Take 180 mg by mouth, Disp: , Rfl:     phenazopyridine (PYRIDIUM) 200 mg tablet, TAKE 1 TABLET BY MOUTH THREE TIMES DAILY AFTER A MEAL FOR 2 DAYS (Patient not taking: Reported on 3/27/2023), Disp: , Rfl:     Vitals:   Pulse: 74  Blood Pressue: 132/78  Weight: 82.6 kg (182 lb)      Physical Exam  Constitutional:       General: She is not in acute distress. Appearance: She is well-developed. She is not diaphoretic. HENT:      Head: Normocephalic. Eyes:      General: No scleral icterus. Right eye: No discharge. Conjunctiva/sclera: Conjunctivae normal.   Neck:      Vascular: No JVD. Cardiovascular:      Rate and Rhythm: Normal rate and regular rhythm. Heart sounds: No murmur heard. No friction rub. No gallop. Pulmonary:      Effort: Pulmonary effort is normal. No respiratory distress. Breath sounds: Normal breath sounds. No wheezing or rales. Abdominal:      General: Bowel sounds are normal. There is no distension. Palpations: Abdomen is soft. Tenderness: There is no abdominal tenderness. There is no rebound. Musculoskeletal:         General: No tenderness or deformity. Cervical back: Normal range of motion. Skin:     General: Skin is warm and dry. Neurological:      Mental Status: She is alert and oriented to person, place, and time.          [unfilled]    Invasive Devices       None                   Lab Results   Component Value Date     07/15/2023    CO2 23 07/15/2023    BUN 17 07/15/2023    CREATININE 0.63 07/15/2023    EGFR 97 07/15/2023    CALCIUM 9.4 12/08/2022    AST 15 07/15/2023    ALT 22 07/15/2023    ALKPHOS 86 05/27/2021     Lab Results Component Value Date    WBC 7.6 11/07/2022    HGB 15.0 11/07/2022     11/07/2022     No components found for: "TROP"    Imaging:     EKG: NSR with PVCs    Counseling / Coordination of Care  Total floor / unit time spent today 45 minutes. Greater than 50% of total time was spent with the patient and / or family counseling and / or coordination of care. A description of the counseling / coordination of care.

## 2023-10-31 DIAGNOSIS — E04.2 MULTIPLE THYROID NODULES: ICD-10-CM

## 2023-10-31 RX ORDER — LEVOTHYROXINE SODIUM 0.12 MG/1
125 TABLET ORAL DAILY
Qty: 30 TABLET | Refills: 2 | Status: SHIPPED | OUTPATIENT
Start: 2023-10-31 | End: 2023-11-03

## 2023-11-03 DIAGNOSIS — E04.2 MULTIPLE THYROID NODULES: ICD-10-CM

## 2023-11-03 RX ORDER — LEVOTHYROXINE SODIUM 0.12 MG/1
125 TABLET ORAL DAILY
Qty: 90 TABLET | Refills: 1 | Status: SHIPPED | OUTPATIENT
Start: 2023-11-03

## 2023-11-08 ENCOUNTER — HOSPITAL ENCOUNTER (OUTPATIENT)
Dept: NON INVASIVE DIAGNOSTICS | Age: 68
Discharge: HOME | End: 2023-11-08
Payer: COMMERCIAL

## 2023-11-08 VITALS
WEIGHT: 182 LBS | DIASTOLIC BLOOD PRESSURE: 90 MMHG | SYSTOLIC BLOOD PRESSURE: 188 MMHG | BODY MASS INDEX: 34.36 KG/M2 | HEIGHT: 61 IN | HEART RATE: 83 BPM

## 2023-11-08 DIAGNOSIS — I49.3 PVC (PREMATURE VENTRICULAR CONTRACTION): ICD-10-CM

## 2023-11-08 LAB
AORTIC ROOT: 3.4 CM
APICAL FOUR CHAMBER EJECTION FRACTION: 60 %
ASCENDING AORTA: 3.5 CM
DOP CALC LVOT AREA: 3.14 CM2
DOP CALC LVOT DIAMETER: 2 CM
E WAVE DECELERATION TIME: 287 MS
E/A RATIO: 0.62
FRACTIONAL SHORTENING: 20 (ref 28–44)
INTERVENTRICULAR SEPTUM IN DIASTOLE (PARASTERNAL SHORT AXIS VIEW): 1.1 CM
INTERVENTRICULAR SEPTUM: 1.1 CM (ref 0.6–1.1)
LAAS-AP2: 27.4 CM2
LAAS-AP4: 20.5 CM2
LEFT ATRIUM AREA SYSTOLE SINGLE PLANE A4C: 19.3 CM2
LEFT ATRIUM SIZE: 3.7 CM
LEFT ATRIUM VOLUME (MOD BIPLANE): 89 ML
LEFT ATRIUM VOLUME INDEX (MOD BIPLANE): 49.2 ML/M2
LEFT INTERNAL DIMENSION IN SYSTOLE: 3.3 CM (ref 2.1–4)
LEFT VENTRICULAR INTERNAL DIMENSION IN DIASTOLE: 4.1 CM (ref 3.5–6)
LEFT VENTRICULAR POSTERIOR WALL IN END DIASTOLE: 1.1 CM
LEFT VENTRICULAR STROKE VOLUME: 32 ML
LVSV (TEICH): 32 ML
MV E'TISSUE VEL-SEP: 6 CM/S
MV PEAK A VEL: 0.94 M/S
MV PEAK E VEL: 58 CM/S
MV STENOSIS PRESSURE HALF TIME: 83 MS
MV VALVE AREA P 1/2 METHOD: 2.65
RA PRESSURE ESTIMATED: 3 MMHG
RIGHT ATRIUM AREA SYSTOLE A4C: 14.7 CM2
RIGHT VENTRICLE ID DIMENSION: 3.8 CM
SL CV LEFT ATRIUM LENGTH A2C: 5.2 CM
SL CV LV EF: 65
SL CV PED ECHO LEFT VENTRICLE DIASTOLIC VOLUME (MOD BIPLANE) 2D: 75 ML
SL CV PED ECHO LEFT VENTRICLE SYSTOLIC VOLUME (MOD BIPLANE) 2D: 43 ML
TRICUSPID ANNULAR PLANE SYSTOLIC EXCURSION: 2.5 CM

## 2023-11-08 PROCEDURE — 93226 XTRNL ECG REC<48 HR SCAN A/R: CPT

## 2023-11-08 PROCEDURE — 93306 TTE W/DOPPLER COMPLETE: CPT | Performed by: INTERNAL MEDICINE

## 2023-11-08 PROCEDURE — 93306 TTE W/DOPPLER COMPLETE: CPT

## 2023-11-08 PROCEDURE — 93225 XTRNL ECG REC<48 HRS REC: CPT

## 2023-11-09 ENCOUNTER — TELEPHONE (OUTPATIENT)
Dept: CARDIOLOGY CLINIC | Facility: CLINIC | Age: 68
End: 2023-11-09

## 2023-11-09 NOTE — TELEPHONE ENCOUNTER
----- Message from Donnamaria Schaumann, MD sent at 11/9/2023 10:32 AM EST -----  Normal EF. Left atrium is dilated and we will monitor this. No changes.    ----- Message -----  From: Joesph Scott MD  Sent: 11/8/2023   1:01 PM EST  To: Donnamaria Schaumann, MD

## 2023-11-10 NOTE — TELEPHONE ENCOUNTER
Wil Molina [phone] It's in reference to my tests that I had yesterday. I wanted to be able to find out a couple answers from the doctor if I could. Thank you Birth date January 9th, 1955.

## 2023-11-16 PROCEDURE — 93227 XTRNL ECG REC<48 HR R&I: CPT | Performed by: INTERNAL MEDICINE

## 2024-01-02 DIAGNOSIS — E11.65 TYPE 2 DIABETES MELLITUS WITH HYPERGLYCEMIA, WITHOUT LONG-TERM CURRENT USE OF INSULIN (HCC): ICD-10-CM

## 2024-01-04 DIAGNOSIS — E11.65 TYPE 2 DIABETES MELLITUS WITH HYPERGLYCEMIA, WITHOUT LONG-TERM CURRENT USE OF INSULIN (HCC): ICD-10-CM

## 2024-01-24 ENCOUNTER — HOSPITAL ENCOUNTER (OUTPATIENT)
Dept: ULTRASOUND IMAGING | Facility: HOSPITAL | Age: 69
Discharge: HOME | End: 2024-01-24
Payer: COMMERCIAL

## 2024-01-24 ENCOUNTER — TELEPHONE (OUTPATIENT)
Dept: SURGICAL ONCOLOGY | Facility: CLINIC | Age: 69
End: 2024-01-24

## 2024-01-24 ENCOUNTER — TELEPHONE (OUTPATIENT)
Dept: HEMATOLOGY ONCOLOGY | Facility: CLINIC | Age: 69
End: 2024-01-24

## 2024-01-24 DIAGNOSIS — Z85.850 HISTORY OF THYROID CANCER: ICD-10-CM

## 2024-01-24 PROCEDURE — 76536 US EXAM OF HEAD AND NECK: CPT

## 2024-01-24 NOTE — TELEPHONE ENCOUNTER
Left message for patient Please get labwork completed prior to your 2/1/24 11:30 am appointment with ROBINSON Moore, Thank you for getting your US completed today

## 2024-01-24 NOTE — TELEPHONE ENCOUNTER
Lab Inquiry   Who are you speaking with? Patient     If it is not the patient, are they listed on an active communication consent form? N/A   Name of ordering provider JUDY Jo   What is being requested? Patient requesting labs to be faxed to [facility] at fax number [phone]   Lab draw location [facility]  [address] [place] [address]   Phone: [phone]   What is the best call back number? [phone]                                                Patient would like a call back once the labs are faxed over to [facility].

## 2024-01-24 NOTE — TELEPHONE ENCOUNTER
Faxed lab orders to [facility] at [phone] (phone [phone]). LM for patient that orders were faxed.

## 2024-01-30 ENCOUNTER — OFFICE VISIT (OUTPATIENT)
Dept: CARDIOLOGY CLINIC | Facility: CLINIC | Age: 69
End: 2024-01-30
Payer: COMMERCIAL

## 2024-01-30 VITALS
BODY MASS INDEX: 32.6 KG/M2 | HEART RATE: 91 BPM | DIASTOLIC BLOOD PRESSURE: 84 MMHG | SYSTOLIC BLOOD PRESSURE: 150 MMHG | WEIGHT: 184 LBS | HEIGHT: 63 IN

## 2024-01-30 DIAGNOSIS — E66.01 MORBID (SEVERE) OBESITY DUE TO EXCESS CALORIES (HCC): ICD-10-CM

## 2024-01-30 DIAGNOSIS — E11.69 TYPE 2 DIABETES MELLITUS WITH OTHER SPECIFIED COMPLICATION, WITHOUT LONG-TERM CURRENT USE OF INSULIN (HCC): ICD-10-CM

## 2024-01-30 DIAGNOSIS — E78.2 MIXED HYPERLIPIDEMIA: ICD-10-CM

## 2024-01-30 DIAGNOSIS — I10 HYPERTENSION, UNSPECIFIED TYPE: ICD-10-CM

## 2024-01-30 DIAGNOSIS — E11.65 TYPE 2 DIABETES MELLITUS WITH HYPERGLYCEMIA, WITHOUT LONG-TERM CURRENT USE OF INSULIN (HCC): ICD-10-CM

## 2024-01-30 DIAGNOSIS — I49.3 PVC (PREMATURE VENTRICULAR CONTRACTION): Primary | ICD-10-CM

## 2024-01-30 LAB
THYROGLOB AB SERPL-ACNC: <1 IU/ML
THYROGLOB SERPL-MCNC: <0.1 NG/ML
THYROGLOB SERPL-MCNC: NORMAL NG/ML

## 2024-01-30 PROCEDURE — 99214 OFFICE O/P EST MOD 30 MIN: CPT | Performed by: INTERNAL MEDICINE

## 2024-01-30 RX ORDER — B-COMPLEX WITH VITAMIN C
TABLET ORAL EVERY 24 HOURS
COMMUNITY

## 2024-01-30 RX ORDER — NICOTINE 14MG/24HR
PATCH, TRANSDERMAL 24 HOURS TRANSDERMAL
COMMUNITY

## 2024-01-30 NOTE — PROGRESS NOTES
Cardiology Follow Up    Lauren Smith  1955  [id number]  [facility]  [address]  [facility] [address]  [facility] [id number] [phone]    1. PVC (premature ventricular contraction)        2. Hypertension, unspecified type        3. Mixed hyperlipidemia            Interval History: Followup PVCs, HTN    Doing well. No chest pain, dyspnea or palpitations. Testing reviewed.     Medical Problems       Problem List       GERD (gastroesophageal reflux disease) (Chronic)    Hypertension (Chronic)    Type 2 diabetes mellitus with hyperglycemia, without long-term current use of insulin (HCC)      Lab Results   Component Value Date    HGBA1C 7.7 (H) 07/15/2023         Dilated pancreatic duct    Overview Signed 10/28/2018  8:57 AM by Sonya Finnegan DO     Noted on CT October 27, 2018         Duodenal ulcer    Multiple thyroid nodules    Overview Signed 8/20/2019  5:24 PM by Sonya Finnegan DO     2014         Right lower lobe pulmonary nodule    Overview Signed 8/20/2019  5:25 PM by Sonya Finnegan DO     Noted on CT of the abdomen pelvis done October 27, 2018 -7 mm         Osteoarthritis of right ankle and foot    Hammer toes of both feet    Overview Signed 8/27/2019  2:06 PM by Scott Gonzalez DPM     hammertoe splints dispensed, monitor for now, gel padding suggested.         Morbid (severe) obesity due to excess calories (HCC)    Hepatic steatosis    Pancreatic cyst    Chronic rhinitis    Malignant melanoma of neck (HCC)    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HCC)    Encounter for follow-up surveillance of thyroid cancer    Postoperative hypothyroidism        Past Medical History:   Diagnosis Date    Acute GI bleeding 10/27/2018    Cervical ca (HCC) 1983    Diabetes mellitus (HCC)     prediabetic     Duodenal ulcer 10/28/2018    History of transfusion     Hypertension     Irritable bowel disease     Melanoma (HCC) 1982    Melena 10/27/2018    Added  automatically from request for surgery [id number]    Thyroid cancer (HCC)     Vertigo      Social History     Socioeconomic History    Marital status:      Spouse name: Not on file    Number of children: Not on file    Years of education: Not on file    Highest education level: Not on file   Occupational History    Not on file   Tobacco Use    Smoking status: Former     Current packs/day: 0.00     Average packs/day: 1 pack/day for 10.0 years (10.0 ttl pk-yrs)     Types: Cigarettes     Start date: 1959     Quit date: 1969     Years since quittin.2    Smokeless tobacco: Never   Vaping Use    Vaping status: Never Used   Substance and Sexual Activity    Alcohol use: Not Currently    Drug use: Never    Sexual activity: Not Currently     Partners: Male   Other Topics Concern    Not on file   Social History Narrative    Lives alone---    Feels safe at home.    Has dentures.    No living will.    Exercises daily--exercise bike and walking to dvd.       Social Determinants of Health     Financial Resource Strain: Not on file   Food Insecurity: Not on file   Transportation Needs: No Transportation Needs (2021)    PRAPARE - Transportation     Lack of Transportation (Medical): No     Lack of Transportation (Non-Medical): No   Physical Activity: Not on file   Stress: Not on file   Social Connections: Not on file   Intimate Partner Violence: Not on file   Housing Stability: Not on file      Family History   Problem Relation Age of Onset    Diabetes type I Mother     Hypertension Mother     Throat cancer Mother 63    Substance Abuse Father         alcohol    Alcohol abuse Father     Ulcers Father     Hypertension Father     Lung cancer Father 70    Heart disease Father     No Known Problems Daughter     No Known Problems Maternal Grandmother     Colon cancer Maternal Grandfather         age unknown    No Known Problems Paternal Grandmother     No Known Problems Paternal Grandfather     No Known  Problems Maternal Aunt     No Known Problems Paternal Aunt     No Known Problems Paternal Aunt     No Known Problems Paternal Aunt     No Known Problems Paternal Aunt     No Known Problems Paternal Aunt     Colon cancer Cousin         20's    Mental illness Neg Hx      Past Surgical History:   Procedure Laterality Date    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    ESOPHAGOGASTRODUODENOSCOPY N/A 10/28/2018    Procedure: ESOPHAGOGASTRODUODENOSCOPY (EGD);  Surgeon: Minerva Santacruz MD;  [facility];  Service: Gastroenterology    LARYNGOSCOPY N/A 12/08/2022    Procedure: FLEXIBLE LARYNGOSCOPY;  Surgeon: Dylan West MD;  [facility];  Service: Surgical Oncology    OOPHORECTOMY Left 1975    PARTIAL HYSTERECTOMY  1980    THYROID LOBECTOMY Right 10/12/2021    Procedure: LOBECTOMY THYROID, right  hemithyroidectomy;  Surgeon: Dylan West MD;  [facility];  Service: Surgical Oncology    THYROID LOBECTOMY Left 12/08/2022    Procedure: COMPLETION (LEFT) THYROIDECTOMY;  Surgeon: Dylan West MD;  [facility];  Service: Surgical Oncology    US GUIDED THYROID BIOPSY  07/20/2021       Current Outpatient Medications:     acetaminophen (TYLENOL) 325 mg tablet, Take 650 mg by mouth every 6 (six) hours as needed for mild pain, Disp: , Rfl:     ascorbic acid (VITAMIN C) 500 MG tablet, Take 500 mg by mouth daily, Disp: , Rfl:     atorvastatin (LIPITOR) 20 mg tablet, Take 20 mg by mouth daily at bedtime, Disp: , Rfl:     b complex vitamins capsule, Take 1 capsule by mouth daily, Disp: , Rfl:     Bacillus Coagulans-Inulin (Probiotic) 1-250 BILLION-MG CAPS, Take 1 capsule by mouth daily, Disp: , Rfl:     Blood Glucose Monitoring Suppl (ONE TOUCH ULTRA 2) w/Device KIT, Testing 4 times daily, Disp: 1 each, Rfl: 0    Cholecalciferol (VITAMIN D3) 2000 units TABS, Take 2,000 Units by mouth in the morning, Disp: , Rfl:     clindamycin (CLINDAGEL) 1 % gel, , Disp: , Rfl:     clobetasol  "(TEMOVATE) 0.05 % ointment, , Disp: , Rfl:     Collagen-Vitamin C-Biotin (COLLAGEN 1500/C PO), , Disp: , Rfl:     Empagliflozin (Jardiance) 25 MG TABS, Take 1 tablet (25 mg total) by mouth every morning, Disp: 30 tablet, Rfl: 3    Ferrous Sulfate (Iron) 325 (65 Fe) MG TABS, Take 1 tablet by mouth daily, Disp: , Rfl:     glucose blood (ONE TOUCH ULTRA TEST) test strip, Testing 4 times daily, Disp: 100 each, Rfl: 5    Lancets (ONETOUCH DELICA PLUS MBUOXF44S) MISC, Test BG 4x daily as directed, Disp: , Rfl:     levothyroxine 125 mcg tablet, take 1 tablet by mouth once daily, Disp: 90 tablet, Rfl: 1    lisinopril (ZESTRIL) 40 mg tablet, Take 40 mg by mouth daily, Disp: , Rfl:     Magnesium 250 MG TABS, Take 1 tablet by mouth in the morning, Disp: , Rfl:     Omega-3 Fatty Acids (FISH OIL) 1,000 mg, Take 1,000 mg by mouth daily, Disp: , Rfl:     Protein POWD, Take by mouth daily, Disp: , Rfl:     Saccharomyces boulardii (Probiotic) 250 MG CAPS, as directed Orally, Disp: , Rfl:     sitaGLIPtin (JANUVIA) 100 mg tablet, Take 1 tablet (100 mg total) by mouth daily, Disp: 30 tablet, Rfl: 2    Vitamin A 2400 MCG (8000 UT) TABS, Take 2,400 mcg by mouth daily, Disp: , Rfl:     VITAMIN E PO, Take 180 mg by mouth, Disp: , Rfl:     Zinc 100 MG TABS, every 24 hours, Disp: , Rfl:   Allergies   Allergen Reactions    Prednisone Anxiety    Metformin Abdominal Pain and GI Intolerance    Corticosteroids Anxiety       Labs:     Chemistry        Component Value Date/Time    K 4.2 07/15/2023 0836     07/15/2023 0836    CO2 23 07/15/2023 0836    BUN 17 07/15/2023 0836    CREATININE 0.63 07/15/2023 0836    CREATININE 0.51 (L) 05/27/2021 1247        Component Value Date/Time    CALCIUM 9.4 12/08/2022 1723    ALKPHOS 86 05/27/2021 1247    AST 15 07/15/2023 0836    ALT 22 07/15/2023 0836            No results found for: \"CHOL\"  Lab Results   Component Value Date    HDL 42 07/15/2023    HDL 41 05/27/2021     Lab Results   Component Value Date " "   LDLCALC 117 (H) 07/15/2023    LDLCALC 117 (H) 05/27/2021     Lab Results   Component Value Date    TRIG 126 07/15/2023    TRIG 137 05/27/2021     No results found for: \"CHOLHDL\"    Imaging: US head neck lymph node mapping    Result Date: 1/29/2024  Narrative: ULTRASOUND HEAD NECK LYMPH NODE MAPPING INDICATION: Z85.850: Personal history of malignant neoplasm of thyroid. COMPARISON: 6/21/2023. FINDINGS: Ultrasound of the thyroidectomy bed and cervical lymph node chains was performed with a high frequency linear transducer. There is no suspicion of recurrent mass in the thyroidectomy bed. Lymph nodes maintain unremarkable morphologic contour, echogenicity and short axis dimensions of less than 0.7 cm. No evidence for microcalcification or focal nodularity.     Impression: No evidence of recurrent or metastatic disease. Workstation performed: [id number]       EKG: .    Review of Systems   Constitutional: Negative.   HENT: Negative.     Eyes: Negative.    Cardiovascular: Negative.    Respiratory: Negative.     Endocrine: Negative.    Hematologic/Lymphatic: Negative.    Skin: Negative.    Musculoskeletal: Negative.    Gastrointestinal: Negative.    Genitourinary: Negative.    Neurological: Negative.    Psychiatric/Behavioral: Negative.     Allergic/Immunologic: Negative.        Vitals:    01/30/24 1047   BP: 150/84   Pulse: 91           Physical Exam  Vitals and nursing note reviewed.   Constitutional:       Appearance: Normal appearance.   HENT:      Head: Normocephalic.      Nose: Nose normal.      Mouth/Throat:      Mouth: Mucous membranes are moist.   Eyes:      General: No scleral icterus.     Conjunctiva/sclera: Conjunctivae normal.   Cardiovascular:      Rate and Rhythm: Normal rate and regular rhythm.      Heart sounds: No murmur heard.     No gallop.   Pulmonary:      Effort: Pulmonary effort is normal. No respiratory distress.      Breath sounds: Normal breath sounds. No wheezing or rales.   Abdominal:      " General: Abdomen is flat. Bowel sounds are normal. There is no distension.      Palpations: Abdomen is soft.      Tenderness: There is no abdominal tenderness. There is no guarding.   Musculoskeletal:      Cervical back: Normal range of motion and neck supple.      Right lower leg: No edema.      Left lower leg: No edema.   Skin:     General: Skin is warm and dry.   Neurological:      General: No focal deficit present.      Mental Status: She is alert and oriented to person, place, and time.   Psychiatric:         Mood and Affect: Mood normal.         Behavior: Behavior normal.         Discussion/Summary:    PVCs: Asymptomatic. LVEF is normal.  Continue to monitor.     Hypertension: BP up here but at home her BP is well controlled. Continue with lisinopril.     Hyperlipidemia: Continue with atorvastatin. Lipids controlled.       The patient was counseled regarding diagnostic results, instructions for management, risk factor reductions, impressions. total time of encounter was 25 minutes and 15 minutes was spent counseling.

## 2024-02-01 ENCOUNTER — OFFICE VISIT (OUTPATIENT)
Dept: SURGICAL ONCOLOGY | Facility: CLINIC | Age: 69
End: 2024-02-01
Payer: COMMERCIAL

## 2024-02-01 VITALS
HEIGHT: 63 IN | RESPIRATION RATE: 16 BRPM | HEART RATE: 92 BPM | SYSTOLIC BLOOD PRESSURE: 142 MMHG | WEIGHT: 183.4 LBS | BODY MASS INDEX: 32.5 KG/M2 | DIASTOLIC BLOOD PRESSURE: 80 MMHG | OXYGEN SATURATION: 95 % | TEMPERATURE: 98.8 F

## 2024-02-01 DIAGNOSIS — C73 HURTHLE CELL CARCINOMA OF THYROID (HCC): ICD-10-CM

## 2024-02-01 DIAGNOSIS — Z85.850 ENCOUNTER FOR FOLLOW-UP SURVEILLANCE OF THYROID CANCER: Primary | ICD-10-CM

## 2024-02-01 DIAGNOSIS — Z08 ENCOUNTER FOR FOLLOW-UP SURVEILLANCE OF THYROID CANCER: Primary | ICD-10-CM

## 2024-02-01 PROCEDURE — 99213 OFFICE O/P EST LOW 20 MIN: CPT

## 2024-02-01 NOTE — PROGRESS NOTES
Surgical Oncology Follow Up       [address]  [facility]  [address]  [facility] [address]    Lauren Smith  1955  [id number]  [address]  [facility] [place]  [address] [place] [address]    Chief Complaint   Patient presents with   • Follow-up       Assessment/Plan:  1. Encounter for follow-up surveillance of thyroid cancer  - 6 mo follow up    2. 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HCC)  - US head neck lymph node mapping; Future  - Thyroglobulin Panel; Future       Discussion/Summary: Patient is a 69-year-old female presenting today for a 6-month follow-up for thyroid cancer diagnosed in 2021 and 2022.  She underwent a right hemithyroidectomy in 2021 as well as a left complete hemithyroidectomy in December 2022. She had radioactive iodine tx. We have been following her with thyroglobulin lab panels as well as ultrasound of the head neck and lymph mapping. Her ultrasound was performed on 1/24/23 and was benign. Her thyroglobulin levels are within normal limits. She is currently being managed by endocrinology. There were no concerns on her neck exam. Patient did note a small 1 cm round lesion on the left shin. This appears necrotic in the center and red around the edges. There is no drainage. She states this began looking like a pustule and was itchy and is now scabbing. She started noticing she was developing these approximately 6 months ago. The first was on the anterior aspect of the right food and it is now healed. I believe this could be a small diabetic ulcer. I recommend she f/u with her family doctor fur further evaluation and possibly a dermatologist.  I will see the patient back in 6 months or sooner should the need arise. She was instructed to call with any questions or concerns prior to this time. All questions were answered today.       History of Present Illness:     Oncology History   Tomas  cell carcinoma of thyroid (HCC)   10/12/2021 Surgery    Right hemithyroidectomy:  -  Oncocytic (Hürthle cell) carcinoma, encapsulated and angioinvasive      12/8/2022 Surgery    Left complete hemithyroidectomy:       - Papillary thyroid carcinoma, classic, 0.15cm      - Lymphovascular invasion: Not identified       - Perineural invasion: Not identified       - All margins negative for invasive carcinoma       -T1a          -Interval History: Patient is a 69-year-old female presenting today for a 6-month follow-up for thyroid cancer diagnosed in 2021 and 2022.  Her ultrasound was performed on 1/24/23 and was benign. Her thyroglobulin levels are within normal limits. Patient did note a small 1 cm round lesion on the left shin. She denies persistent headaches, bone pain, back pain, shortness of breath, or abdominal pain.      Review of Systems:  Review of Systems   Constitutional:  Negative for activity change, appetite change, fatigue and unexpected weight change.   HENT:  Negative for trouble swallowing and voice change.    Respiratory:  Negative for cough and shortness of breath.    Cardiovascular:  Negative for chest pain.   Gastrointestinal:  Negative for abdominal pain, diarrhea, nausea and vomiting.   Endocrine: Negative for heat intolerance.   Musculoskeletal:  Negative for arthralgias, back pain and myalgias.   Skin:  Negative for rash.   Neurological:  Negative for weakness and headaches.   Hematological:  Negative for adenopathy.       Patient Active Problem List   Diagnosis   • GERD (gastroesophageal reflux disease)   • Hypertension   • Type 2 diabetes mellitus with other specified complication, without long-term current use of insulin (HCC)   • Dilated pancreatic duct   • Duodenal ulcer   • Multiple thyroid nodules   • Right lower lobe pulmonary nodule   • Osteoarthritis of right ankle and foot   • Hammer toes of both feet   • Morbid (severe) obesity due to excess calories (HCC)   • Hepatic steatosis   •  Pancreatic cyst   • Chronic rhinitis   • Malignant melanoma of neck (HCC)   • 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HCC)   • Encounter for follow-up surveillance of thyroid cancer   • Postoperative hypothyroidism     Past Medical History:   Diagnosis Date   • Acute GI bleeding 10/27/2018   • Cervical ca (HCC) 1983   • Diabetes mellitus (HCC)     prediabetic    • Duodenal ulcer 10/28/2018   • History of transfusion    • Hypertension    • Irritable bowel disease    • Melanoma (HCC) 1982   • Melena 10/27/2018    Added automatically from request for surgery [id number]   • Thyroid cancer (HCC) 2022   • Vertigo      Past Surgical History:   Procedure Laterality Date   • 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   • ESOPHAGOGASTRODUODENOSCOPY N/A 10/28/2018    Procedure: ESOPHAGOGASTRODUODENOSCOPY (EGD);  Surgeon: Minerva Santacruz MD;  [facility];  Service: Gastroenterology   • LARYNGOSCOPY N/A 12/08/2022    Procedure: FLEXIBLE LARYNGOSCOPY;  Surgeon: Dylan West MD;  [facility];  Service: Surgical Oncology   • OOPHORECTOMY Left 1975   • PARTIAL HYSTERECTOMY  1980   • THYROID LOBECTOMY Right 10/12/2021    Procedure: LOBECTOMY THYROID, right  hemithyroidectomy;  Surgeon: Dylan West MD;  [facility];  Service: Surgical Oncology   • THYROID LOBECTOMY Left 12/08/2022    Procedure: COMPLETION (LEFT) THYROIDECTOMY;  Surgeon: Dylan West MD;  [facility];  Service: Surgical Oncology   • US GUIDED THYROID BIOPSY  07/20/2021     Family History   Problem Relation Age of Onset   • Diabetes type I Mother    • Hypertension Mother    • Throat cancer Mother 63   • Substance Abuse Father         alcohol   • Alcohol abuse Father    • Ulcers Father    • Hypertension Father    • Lung cancer Father 70   • Heart disease Father    • No Known Problems Daughter    • No Known Problems Maternal Grandmother    • Colon cancer Maternal Grandfather         age unknown   • No Known Problems  Paternal Grandmother    • No Known Problems Paternal Grandfather    • No Known Problems Maternal Aunt    • No Known Problems Paternal Aunt    • No Known Problems Paternal Aunt    • No Known Problems Paternal Aunt    • No Known Problems Paternal Aunt    • No Known Problems Paternal Aunt    • Colon cancer Cousin         20's   • Mental illness Neg Hx      Social History     Socioeconomic History   • Marital status:      Spouse name: Not on file   • Number of children: Not on file   • Years of education: Not on file   • Highest education level: Not on file   Occupational History   • Not on file   Tobacco Use   • Smoking status: Former     Current packs/day: 0.00     Average packs/day: 1 pack/day for 10.0 years (10.0 ttl pk-yrs)     Types: Cigarettes     Start date: 1959     Quit date: 1969     Years since quittin.2   • Smokeless tobacco: Never   Vaping Use   • Vaping status: Never Used   Substance and Sexual Activity   • Alcohol use: Not Currently   • Drug use: Never   • Sexual activity: Not Currently     Partners: Male   Other Topics Concern   • Not on file   Social History Narrative    Lives alone---    Feels safe at home.    Has dentures.    No living will.    Exercises daily--exercise bike and walking to dvd.       Social Determinants of Health     Financial Resource Strain: Not on file   Food Insecurity: Not on file   Transportation Needs: No Transportation Needs (2021)    PRAPARE - Transportation    • Lack of Transportation (Medical): No    • Lack of Transportation (Non-Medical): No   Physical Activity: Not on file   Stress: Not on file   Social Connections: Not on file   Intimate Partner Violence: Not on file   Housing Stability: Not on file       Current Outpatient Medications:   •  acetaminophen (TYLENOL) 325 mg tablet, Take 650 mg by mouth every 6 (six) hours as needed for mild pain, Disp: , Rfl:   •  ascorbic acid (VITAMIN C) 500 MG tablet, Take 500 mg by mouth daily, Disp:  , Rfl:   •  atorvastatin (LIPITOR) 20 mg tablet, Take 20 mg by mouth daily at bedtime, Disp: , Rfl:   •  b complex vitamins capsule, Take 1 capsule by mouth daily, Disp: , Rfl:   •  Bacillus Coagulans-Inulin (Probiotic) 1-250 BILLION-MG CAPS, Take 1 capsule by mouth daily, Disp: , Rfl:   •  Blood Glucose Monitoring Suppl (ONE TOUCH ULTRA 2) w/Device KIT, Testing 4 times daily, Disp: 1 each, Rfl: 0  •  Cholecalciferol (VITAMIN D3) 2000 units TABS, Take 2,000 Units by mouth in the morning, Disp: , Rfl:   •  clindamycin (CLINDAGEL) 1 % gel, , Disp: , Rfl:   •  clobetasol (TEMOVATE) 0.05 % ointment, , Disp: , Rfl:   •  Collagen-Vitamin C-Biotin (COLLAGEN 1500/C PO), , Disp: , Rfl:   •  Empagliflozin (Jardiance) 25 MG TABS, Take 1 tablet (25 mg total) by mouth every morning, Disp: 30 tablet, Rfl: 3  •  Ferrous Sulfate (Iron) 325 (65 Fe) MG TABS, Take 1 tablet by mouth daily, Disp: , Rfl:   •  glucose blood (ONE TOUCH ULTRA TEST) test strip, Testing 4 times daily, Disp: 100 each, Rfl: 5  •  Lancets (ONETOUCH DELICA PLUS [id number]) MISC, Test BG 4x daily as directed, Disp: , Rfl:   •  levothyroxine 125 mcg tablet, take 1 tablet by mouth once daily, Disp: 90 tablet, Rfl: 1  •  lisinopril (ZESTRIL) 40 mg tablet, Take 40 mg by mouth daily, Disp: , Rfl:   •  Magnesium 250 MG TABS, Take 1 tablet by mouth in the morning, Disp: , Rfl:   •  Omega-3 Fatty Acids (FISH OIL) 1,000 mg, Take 1,000 mg by mouth daily, Disp: , Rfl:   •  Protein POWD, Take by mouth daily, Disp: , Rfl:   •  Saccharomyces boulardii (Probiotic) 250 MG CAPS, as directed Orally, Disp: , Rfl:   •  sitaGLIPtin (JANUVIA) 100 mg tablet, Take 1 tablet (100 mg total) by mouth daily, Disp: 30 tablet, Rfl: 2  •  Vitamin A 2400 MCG (8000 UT) TABS, Take 2,400 mcg by mouth daily, Disp: , Rfl:   •  VITAMIN E PO, Take 180 mg by mouth, Disp: , Rfl:   •  Zinc 100 MG TABS, every 24 hours, Disp: , Rfl:   Allergies   Allergen Reactions   • Prednisone Anxiety   • Metformin  Abdominal Pain and GI Intolerance   • Corticosteroids Anxiety     Vitals:    02/01/24 1141   BP: 142/80   Pulse: 92   Resp: 16   Temp: 98.8 °F (37.1 °C)   SpO2: 95%       Physical Exam  Constitutional:       General: She is not in acute distress.     Appearance: Normal appearance.   Neck:      Thyroid: No thyroid mass, thyromegaly or thyroid tenderness.     Cardiovascular:      Rate and Rhythm: Normal rate and regular rhythm.      Pulses: Normal pulses.      Heart sounds: Normal heart sounds.   Pulmonary:      Effort: Pulmonary effort is normal.      Breath sounds: Normal breath sounds.   Chest:      Chest wall: No mass.   Breasts:     Right: No swelling, bleeding, inverted nipple, mass, nipple discharge, skin change or tenderness.      Left: No swelling, bleeding, inverted nipple, mass, nipple discharge, skin change or tenderness.   Abdominal:      General: Abdomen is flat.      Palpations: Abdomen is soft.   Lymphadenopathy:      Cervical: No cervical adenopathy.      Right cervical: No superficial, deep or posterior cervical adenopathy.     Left cervical: No superficial, deep or posterior cervical adenopathy.      Upper Body:      Right upper body: No supraclavicular, axillary or pectoral adenopathy.      Left upper body: No supraclavicular, axillary or pectoral adenopathy.   Skin:     General: Skin is warm.      Findings: Lesion present.             Comments: 1 cm lesion left shin   Neurological:      General: No focal deficit present.      Mental Status: She is alert and oriented to person, place, and time.   Psychiatric:         Mood and Affect: Mood normal.         Behavior: Behavior normal.           Results:    Imaging  US head neck lymph node mapping    Result Date: 1/29/2024  Narrative: ULTRASOUND HEAD NECK LYMPH NODE MAPPING INDICATION: Z85.850: Personal history of malignant neoplasm of thyroid. COMPARISON: 6/21/2023. FINDINGS: Ultrasound of the thyroidectomy bed and cervical lymph node chains was  performed with a high frequency linear transducer. There is no suspicion of recurrent mass in the thyroidectomy bed. Lymph nodes maintain unremarkable morphologic contour, echogenicity and short axis dimensions of less than 0.7 cm. No evidence for microcalcification or focal nodularity.     Impression: No evidence of recurrent or metastatic disease. Workstation performed: [id number]       I reviewed the above imaging data.      Advance Care Planning/Advance Directives:  Discussed disease status, cancer treatment plans and/or cancer treatment goals with the patient.

## 2024-03-08 ENCOUNTER — TELEPHONE (OUTPATIENT)
Dept: ENDOCRINOLOGY | Facility: CLINIC | Age: 69
End: 2024-03-08

## 2024-04-01 DIAGNOSIS — E11.65 TYPE 2 DIABETES MELLITUS WITH HYPERGLYCEMIA, WITHOUT LONG-TERM CURRENT USE OF INSULIN (HCC): ICD-10-CM

## 2024-04-03 ENCOUNTER — TELEPHONE (OUTPATIENT)
Age: 69
End: 2024-04-03

## 2024-04-03 NOTE — TELEPHONE ENCOUNTER
JUAN Cantu    Submitted via    []CMM-KEY   [x][employer]-Case ID # [id number]   []Faxed to plan   []Other website   []Phone call Case ID #     Office notes sent, clinical questions answered. Awaiting determination    Turnaround time for your insurance to make a decision on your Prior Authorization can take 7-21 business days.

## 2024-04-09 ENCOUNTER — TELEPHONE (OUTPATIENT)
Age: 69
End: 2024-04-09

## 2024-04-09 NOTE — TELEPHONE ENCOUNTER
Patient called requesting refill for Januvia 100mg. Patient made aware a prior Auth was initiated on 04/03/24 and could take 7-21 Business days. Patient is going to contact her insurance company to get an update from them

## 2024-04-09 NOTE — TELEPHONE ENCOUNTER
Elías Pena calling regarding the patients Januvia. The patient was told to reach out to her insurance company for an update. They said her Alondra does not need a PA and is covered without issues for a 30 or 90 day supply.    They just need the script to be sent back in.

## 2024-04-29 DIAGNOSIS — E11.65 TYPE 2 DIABETES MELLITUS WITH HYPERGLYCEMIA, WITHOUT LONG-TERM CURRENT USE OF INSULIN (HCC): ICD-10-CM

## 2024-04-30 ENCOUNTER — TELEPHONE (OUTPATIENT)
Age: 69
End: 2024-04-30

## 2024-04-30 NOTE — TELEPHONE ENCOUNTER
PA for Jardiance    Submitted via    []CMM-KEY    [][employer]-Case ID # [id number]   []Faxed to plan   []Other website    []Phone call Case ID #      Office notes sent, clinical questions answered. Awaiting determination    Turnaround time for your insurance to make a decision on your Prior Authorization can take 7-21 business days.

## 2024-05-08 DIAGNOSIS — E11.65 TYPE 2 DIABETES MELLITUS WITH HYPERGLYCEMIA, WITHOUT LONG-TERM CURRENT USE OF INSULIN (HCC): ICD-10-CM

## 2024-06-12 DIAGNOSIS — E11.65 TYPE 2 DIABETES MELLITUS WITH HYPERGLYCEMIA, WITHOUT LONG-TERM CURRENT USE OF INSULIN (HCC): ICD-10-CM

## 2024-06-12 RX ORDER — SITAGLIPTIN 100 MG/1
100 TABLET, FILM COATED ORAL DAILY
Qty: 30 TABLET | Refills: 5 | Status: SHIPPED | OUTPATIENT
Start: 2024-06-12

## 2024-07-16 DIAGNOSIS — E04.2 MULTIPLE THYROID NODULES: ICD-10-CM

## 2024-07-17 RX ORDER — LEVOTHYROXINE SODIUM 0.12 MG/1
125 TABLET ORAL DAILY
Qty: 90 TABLET | Refills: 1 | Status: SHIPPED | OUTPATIENT
Start: 2024-07-17

## 2024-07-24 ENCOUNTER — HOSPITAL ENCOUNTER (OUTPATIENT)
Dept: ULTRASOUND IMAGING | Facility: HOSPITAL | Age: 69
Discharge: HOME | End: 2024-07-24
Payer: COMMERCIAL

## 2024-07-24 DIAGNOSIS — C73 HURTHLE CELL CARCINOMA OF THYROID (HCC): ICD-10-CM

## 2024-07-24 PROCEDURE — 76536 US EXAM OF HEAD AND NECK: CPT

## 2024-10-08 DIAGNOSIS — E11.65 TYPE 2 DIABETES MELLITUS WITH HYPERGLYCEMIA, WITHOUT LONG-TERM CURRENT USE OF INSULIN (HCC): ICD-10-CM

## 2024-10-11 ENCOUNTER — TELEPHONE (OUTPATIENT)
Age: 69
End: 2024-10-11

## 2024-10-11 NOTE — TELEPHONE ENCOUNTER
FYI- Patient called in just to inform that she will be having her NP care for her Diabetic medication.

## 2025-01-08 ENCOUNTER — HOSPITAL ENCOUNTER (OUTPATIENT)
Dept: MAMMOGRAPHY | Facility: IMAGING CENTER | Age: 70
Discharge: HOME | End: 2025-01-08
Payer: COMMERCIAL

## 2025-01-08 VITALS — HEIGHT: 63 IN | WEIGHT: 183 LBS | BODY MASS INDEX: 32.43 KG/M2

## 2025-01-08 DIAGNOSIS — Z12.31 ENCOUNTER FOR SCREENING MAMMOGRAM FOR MALIGNANT NEOPLASM OF BREAST: ICD-10-CM

## 2025-01-08 PROCEDURE — 77067 SCR MAMMO BI INCL CAD: CPT

## 2025-01-08 PROCEDURE — 77063 BREAST TOMOSYNTHESIS BI: CPT

## (undated) DEVICE — SUT SILK 3-0 18 IN A184H

## (undated) DEVICE — SUT VICRYL 4-0 PS-2 27 IN J426H

## (undated) DEVICE — SYRINGE 50ML LL

## (undated) DEVICE — 3M™ STERI-STRIP™ REINFORCED ADHESIVE SKIN CLOSURES, R1546, 1/4 IN X 4 IN (6 MM X 100 MM), 10 STRIPS/ENVELOPE: Brand: 3M™ STERI-STRIP™

## (undated) DEVICE — SURGICEL 4 X 8

## (undated) DEVICE — PLUMEPEN PRO 10FT

## (undated) DEVICE — SUT SILK 2-0 SH 30 IN K833H

## (undated) DEVICE — HARMONIC 1100 SHEARS, 20CM SHAFT LENGTH: Brand: HARMONIC

## (undated) DEVICE — AMBU ASCOPE 4 RHINOLARYNGO SLIM SINGLE-USE, FLEXIBLE RHINOLARYNGOSCOPE STERILE FROM PACKAGE. INSERTION CORD DIAMETER 3.0 MM, LENGHT OF 300 MM. AND A 130-DEGREE BENDING ANGLE. MINIMUM PURCHASE 5 PCS = 1 BOX: Brand: ASCOPE™ 4 RHINOLARYNGO SLIM

## (undated) DEVICE — SYRINGE 30ML LL

## (undated) DEVICE — TUBING SUCTION 5MM X 12 FT

## (undated) DEVICE — LIGACLIP MCA MULTIPLE CLIP APPLIERS, 20 SMALL CLIPS: Brand: LIGACLIP

## (undated) DEVICE — TELFA NON-ADHERENT ABSORBENT DRESSING: Brand: TELFA

## (undated) DEVICE — SURGICEL SNOW 1 X 2 IN

## (undated) DEVICE — PACK UNIVERSAL NECK

## (undated) DEVICE — 3M™ STERI-STRIP™ REINFORCED ADHESIVE SKIN CLOSURES, R1547, 1/2 IN X 4 IN (12 MM X 100 MM), 6 STRIPS/ENVELOPE: Brand: 3M™ STERI-STRIP™

## (undated) DEVICE — GAUZE SPONGES,16 PLY: Brand: CURITY

## (undated) DEVICE — SUT VICRYL 3-0 18 IN J110T

## (undated) DEVICE — GLOVE INDICATOR PI UNDERGLOVE SZ 7 BLUE

## (undated) DEVICE — ELECTRODE BLADE MOD E-Z CLEAN 2.5IN 6.4CM -0012M

## (undated) DEVICE — INTENDED FOR TISSUE SEPARATION, AND OTHER PROCEDURES THAT REQUIRE A SHARP SURGICAL BLADE TO PUNCTURE OR CUT.: Brand: BARD-PARKER ® CARBON RIB-BACK BLADES

## (undated) DEVICE — 1200CC GUARDIAN II: Brand: GUARDIAN

## (undated) DEVICE — BIPOLAR CORD DISP

## (undated) DEVICE — GLOVE SRG BIOGEL ECLIPSE 7

## (undated) DEVICE — MINOR PROCEDURE DRAPE: Brand: CONVERTORS

## (undated) DEVICE — DRAPE SURGIKIT SADDLE BAG

## (undated) DEVICE — SUT SILK 2-0 18 IN A185H

## (undated) DEVICE — SUT SILK 2-0 SH CR/8 18 IN C012D

## (undated) DEVICE — 3M™ STERI-STRIP™ COMPOUND BENZOIN TINCTURE 40 BAGS/CARTON 4 CARTONS/CASE C1544: Brand: 3M™ STERI-STRIP™

## (undated) DEVICE — SUT VICRYL 3-0 SH 27 IN J416H

## (undated) DEVICE — SUT MONOCRYL 5-0 P-3 18 IN Y493G

## (undated) DEVICE — BETHLEHEM UNIVERSAL OUTPATIENT: Brand: CARDINAL HEALTH

## (undated) DEVICE — SEPRA FILM 6 X 5

## (undated) DEVICE — ELECTRODE BLADE MOD E-Z CLEAN 4IN -0014AM

## (undated) DEVICE — NEEDLE 18 G X 1 1/2

## (undated) DEVICE — 3000CC GUARDIAN II: Brand: GUARDIAN